# Patient Record
Sex: FEMALE | Race: WHITE | NOT HISPANIC OR LATINO | Employment: FULL TIME | URBAN - METROPOLITAN AREA
[De-identification: names, ages, dates, MRNs, and addresses within clinical notes are randomized per-mention and may not be internally consistent; named-entity substitution may affect disease eponyms.]

---

## 2019-02-25 ENCOUNTER — HOSPITAL ENCOUNTER (EMERGENCY)
Facility: HOSPITAL | Age: 49
Discharge: ANOTHER HOSPITAL | End: 2019-02-25
Attending: EMERGENCY MEDICINE

## 2019-02-25 VITALS
BODY MASS INDEX: 42.75 KG/M2 | WEIGHT: 266 LBS | RESPIRATION RATE: 16 BRPM | TEMPERATURE: 98 F | OXYGEN SATURATION: 100 % | SYSTOLIC BLOOD PRESSURE: 111 MMHG | HEART RATE: 83 BPM | DIASTOLIC BLOOD PRESSURE: 54 MMHG | HEIGHT: 66 IN

## 2019-02-25 DIAGNOSIS — N93.8 DYSFUNCTIONAL UTERINE BLEEDING: ICD-10-CM

## 2019-02-25 DIAGNOSIS — D64.9 SYMPTOMATIC ANEMIA: Primary | ICD-10-CM

## 2019-02-25 LAB
ABO + RH BLD: NORMAL
ALBUMIN SERPL BCP-MCNC: 3.7 G/DL
ALP SERPL-CCNC: 66 U/L
ALT SERPL W/O P-5'-P-CCNC: 21 U/L
ANION GAP SERPL CALC-SCNC: 13 MMOL/L
ANISOCYTOSIS BLD QL SMEAR: ABNORMAL
AST SERPL-CCNC: 14 U/L
B-HCG UR QL: NEGATIVE
BASOPHILS # BLD AUTO: 0.1 K/UL
BASOPHILS NFR BLD: 1.3 %
BILIRUB SERPL-MCNC: 0.4 MG/DL
BLD GP AB SCN CELLS X3 SERPL QL: NORMAL
BLD PROD TYP BPU: NORMAL
BLD PROD TYP BPU: NORMAL
BLOOD UNIT EXPIRATION DATE: NORMAL
BLOOD UNIT EXPIRATION DATE: NORMAL
BLOOD UNIT TYPE CODE: 9500
BLOOD UNIT TYPE CODE: 9500
BLOOD UNIT TYPE: NORMAL
BLOOD UNIT TYPE: NORMAL
BUN SERPL-MCNC: 10 MG/DL
CALCIUM SERPL-MCNC: 9.1 MG/DL
CHLORIDE SERPL-SCNC: 107 MMOL/L
CO2 SERPL-SCNC: 20 MMOL/L
CODING SYSTEM: NORMAL
CODING SYSTEM: NORMAL
CREAT SERPL-MCNC: 0.8 MG/DL
CTP QC/QA: YES
DIFFERENTIAL METHOD: ABNORMAL
DISPENSE STATUS: NORMAL
DISPENSE STATUS: NORMAL
EOSINOPHIL # BLD AUTO: 0.1 K/UL
EOSINOPHIL NFR BLD: 0.8 %
ERYTHROCYTE [DISTWIDTH] IN BLOOD BY AUTOMATED COUNT: 16.6 %
EST. GFR  (AFRICAN AMERICAN): >60 ML/MIN/1.73 M^2
EST. GFR  (NON AFRICAN AMERICAN): >60 ML/MIN/1.73 M^2
GLUCOSE SERPL-MCNC: 196 MG/DL
HCT VFR BLD AUTO: 19.8 %
HGB BLD-MCNC: 6.1 G/DL
HYPOCHROMIA BLD QL SMEAR: ABNORMAL
LYMPHOCYTES # BLD AUTO: 2.7 K/UL
LYMPHOCYTES NFR BLD: 25.9 %
MCH RBC QN AUTO: 23.3 PG
MCHC RBC AUTO-ENTMCNC: 30.7 G/DL
MCV RBC AUTO: 76 FL
MONOCYTES # BLD AUTO: 0.8 K/UL
MONOCYTES NFR BLD: 7.4 %
NEUTROPHILS # BLD AUTO: 6.8 K/UL
NEUTROPHILS NFR BLD: 64.6 %
NUM UNITS TRANS PACKED RBC: NORMAL
NUM UNITS TRANS PACKED RBC: NORMAL
OVALOCYTES BLD QL SMEAR: ABNORMAL
PLATELET # BLD AUTO: 223 K/UL
PLATELET BLD QL SMEAR: ABNORMAL
PMV BLD AUTO: 9.4 FL
POIKILOCYTOSIS BLD QL SMEAR: SLIGHT
POLYCHROMASIA BLD QL SMEAR: ABNORMAL
POTASSIUM SERPL-SCNC: 4.2 MMOL/L
PROT SERPL-MCNC: 7 G/DL
RBC # BLD AUTO: 2.61 M/UL
SODIUM SERPL-SCNC: 140 MMOL/L
WBC # BLD AUTO: 10.5 K/UL

## 2019-02-25 PROCEDURE — 80053 COMPREHEN METABOLIC PANEL: CPT

## 2019-02-25 PROCEDURE — 86901 BLOOD TYPING SEROLOGIC RH(D): CPT

## 2019-02-25 PROCEDURE — 25000003 PHARM REV CODE 250: Performed by: PHYSICIAN ASSISTANT

## 2019-02-25 PROCEDURE — 85025 COMPLETE CBC W/AUTO DIFF WBC: CPT

## 2019-02-25 PROCEDURE — 99291 CRITICAL CARE FIRST HOUR: CPT

## 2019-02-25 PROCEDURE — P9016 RBC LEUKOCYTES REDUCED: HCPCS

## 2019-02-25 PROCEDURE — 81025 URINE PREGNANCY TEST: CPT | Performed by: PHYSICIAN ASSISTANT

## 2019-02-25 PROCEDURE — 86920 COMPATIBILITY TEST SPIN: CPT

## 2019-02-25 RX ORDER — AMOXICILLIN 500 MG
CAPSULE ORAL 2 TIMES DAILY
COMMUNITY

## 2019-02-25 RX ORDER — HYDROCODONE BITARTRATE AND ACETAMINOPHEN 500; 5 MG/1; MG/1
TABLET ORAL
Status: DISCONTINUED | OUTPATIENT
Start: 2019-02-25 | End: 2019-02-25 | Stop reason: HOSPADM

## 2019-02-25 RX ORDER — PIOGLITAZONEHYDROCHLORIDE 30 MG/1
30 TABLET ORAL DAILY
COMMUNITY

## 2019-02-25 RX ORDER — NORETHINDRONE ACETATE AND ETHINYL ESTRADIOL .02; 1 MG/1; MG/1
1 TABLET ORAL DAILY
COMMUNITY

## 2019-02-25 RX ORDER — ASPIRIN 81 MG/1
81 TABLET ORAL DAILY
COMMUNITY

## 2019-02-25 RX ORDER — GLIMEPIRIDE 4 MG/1
4 TABLET ORAL
COMMUNITY

## 2019-02-25 RX ORDER — ACETAMINOPHEN 325 MG/1
650 TABLET ORAL
Status: COMPLETED | OUTPATIENT
Start: 2019-02-25 | End: 2019-02-25

## 2019-02-25 RX ORDER — ROSUVASTATIN CALCIUM 20 MG/1
20 TABLET, COATED ORAL DAILY
COMMUNITY

## 2019-02-25 RX ADMIN — ACETAMINOPHEN 650 MG: 325 TABLET ORAL at 12:02

## 2019-02-25 NOTE — ED NOTES
Blood transfusion in progress. Pt tolerating transfusion well. No s/sx of distress noted. Awaiting [facility] for transfer to [facility]. Will monitor prn.

## 2019-02-25 NOTE — ED NOTES
Pt presents to ED with c/o vaginal bleeding x 6 months. States bleeding has worsened over the past 2 weeks. Also states she had started to feel fatigued and SOB. Pt ambulated to ED bed without difficulty. VSS. No needs identified. Awaiting provider eval. Will monitor prn.

## 2019-02-25 NOTE — ED NOTES
Pt to be transferred to [facility]. Pt updated on plan of care and verbalizes understanding. No other needs identified.

## 2019-02-25 NOTE — ED PROVIDER NOTES
"Encounter Date: 2019    SCRIBE #1 NOTE: I, Thao Rigoberto, am scribing for, and in the presence of, Jennifer Feliciano PA-C.       History     Chief Complaint   Patient presents with    Vaginal Bleeding     started      Weakness     " feels like I am going to pass out "    Dizziness       Time seen by provider: 10:33 AM on 2019    Gayatri Dela Cruz is a 48 y.o. female with PMHx of HTN, DM, and morbid obesity who presents to the ED with complaints of persistent vaginal bleeding associated with generalized weakness and SOB with exertion. Patient reports having "heavy bleeding" for the past x6 months and began birth control x3 weeks ago as prescribed from Dr. Nate Dsouza MD with improvements to bleeding. Patient endorses bleeding started again almost x1 week ago (). She relays wearing 3 pads at a time and changes them every x30 minutes. She denies vaginal discharge and urinary symptoms. Overall weakness and SOB exertion started x2 days ago. Patient was referred to the ED by the doctor who prescribed the birth control. Patient is . Patient has no other medical concerns or complaints. She denies fever and onset of any other new symptoms currently. No pertinent SHx on file. NKDA noted.       The history is provided by the patient.     Review of patient's allergies indicates:  No Known Allergies  Past Medical History:   Diagnosis Date    Diabetes mellitus     Hypertension     Morbid (severe) obesity due to excess calories      History reviewed. No pertinent surgical history.  History reviewed. No pertinent family history.  Social History     Tobacco Use    Smoking status: Never Smoker    Smokeless tobacco: Never Used   Substance Use Topics    Alcohol use: Yes     Comment: social    Drug use: No     Review of Systems   Constitutional: Negative for fever.   HENT: Negative for congestion and sore throat.    Eyes: Negative for visual disturbance.   Respiratory: Positive for shortness " of breath (w/ exertion). Negative for cough.    Cardiovascular: Negative for chest pain.   Gastrointestinal: Negative for nausea and vomiting.   Genitourinary: Positive for menstrual problem and vaginal bleeding. Negative for decreased urine volume, difficulty urinating and vaginal discharge.   Musculoskeletal: Negative for joint swelling.   Skin: Negative for rash.   Neurological: Positive for weakness (generalized). Negative for headaches.   Hematological: Does not bruise/bleed easily.   All other systems reviewed and are negative.      Physical Exam     Initial Vitals   BP Pulse Resp Temp SpO2   02/25/19 1020 02/25/19 1020 02/25/19 1215 02/25/19 1020 02/25/19 1020   (!) 159/74 97 16 98 °F (36.7 °C) 100 %      MAP       --                Physical Exam    Nursing note and vitals reviewed.  Constitutional: She appears well-developed and well-nourished. She is not diaphoretic. She is cooperative.  Non-toxic appearance. She does not have a sickly appearance. No distress.   HENT:   Head: Normocephalic and atraumatic.   Right Ear: External ear normal.   Left Ear: External ear normal.   Nose: Nose normal.   Mouth/Throat: Oropharynx is clear and moist.   Eyes: Conjunctivae and lids are normal.   Neck: Normal range of motion and full passive range of motion without pain. Neck supple.   Cardiovascular: Normal rate, regular rhythm and normal heart sounds. Exam reveals no gallop and no friction rub.    No murmur heard.  Pulmonary/Chest: Breath sounds normal. She has no wheezes. She has no rhonchi. She has no rales.   Abdominal: Soft. Normal appearance. There is no tenderness. There is no rigidity, no rebound and no guarding.   Genitourinary: Uterus normal. Cervix exhibits no discharge. Right adnexum displays no mass and no tenderness. Left adnexum displays no mass and no tenderness. There is bleeding in the vagina. No vaginal discharge found.   Genitourinary Comments: Moderate amount of blood in vaginal vault. No active  hemorrhaging. No adnexal or cervical motional tenderness. No vaginal discharge.    Neurological: She is alert and oriented to person, place, and time.   Skin: Skin is warm, dry and intact. No rash noted.         ED Course   Critical Care  Date/Time: 2/25/2019 6:12 PM  Performed by: Josr Guillen MD  Authorized by: Josr Guillen MD   Direct patient critical care time: 19 minutes  Additional history critical care time: 8 minutes  Ordering / reviewing critical care time: 12 minutes  Documentation critical care time: 7 minutes  Consulting other physicians critical care time: 8 minutes  Total critical care time (exclusive of procedural time) : 54 minutes  Critical care was necessary to treat or prevent imminent or life-threatening deterioration of the following conditions: Anemia requiring a blood transfusion.  Critical care was time spent personally by me on the following activities: blood draw for specimens, discussions with consultants, obtaining history from patient or surrogate, ordering and review of laboratory studies, pulse oximetry, review of old charts, re-evaluation of patient's condition and ordering and performing treatments and interventions.        Labs Reviewed   CBC W/ AUTO DIFFERENTIAL - Abnormal; Notable for the following components:       Result Value    RBC 2.61 (*)     Hemoglobin 6.1 (*)     Hematocrit 19.8 (*)     MCV 76 (*)     MCH 23.3 (*)     MCHC 30.7 (*)     RDW 16.6 (*)     All other components within normal limits    Narrative:        critical H&H result(s) called and verbal readback obtained from   Manas Allna @1127, 02/25/2019 11:28   COMPREHENSIVE METABOLIC PANEL - Abnormal; Notable for the following components:    CO2 20 (*)     Glucose 196 (*)     All other components within normal limits   POCT URINE PREGNANCY   TYPE & SCREEN   PREPARE RBC SOFT          Imaging Results    None          Medical Decision Making:   History:   Old Medical Records: I decided to obtain old medical  records.  Clinical Tests:   Lab Tests: Reviewed and Ordered  Other:   I discussed test(s) with the performing physician.  I have discussed this case with another health care provider.       APC / Resident Notes:   Urgent evaluation of a 48-year-old female who presents with dysfunctional uterine bleeding.  She reports she has been battling issue for over 6 months and has been seeing gynecology in [place]. She reports on 2/11: US recently showed no sign of a fibroid, but did comment on a large ovarian cyst. Recommend  endometrial biopsy and surgical intervention for cyst. Started oral contraceptives at that time which stopped her bleeding for one week, then resumed on 2/19.  She states she does not have insurance and will not be added her  insurance to the end of the year.  She was attempting to wait for that before having any procedures done.  However over the last 2 days she has been feeling weak and short of breath. She is well appearing.  She denies any pain.  Her abdomen is soft and nontender.  Pelvic exam shows blood with no adnexal tenderness.  I doubt ovarian torsion.  Labs show critical H&H.  We do not have gynecology this facility and therefore patient was transferred to [facility] for further evaluation.  Blood transfusion started prior to transfer.  She has been accepted by Dr. Auguste (GYN) and will go to [facility] ER where she has been accepted by Dr. Conde. Patient was updated on plan of care. All questions answered. Case discussed with Dr. Guillen who has evaluated the patient and is in agreement with the plan of care.       Scribe Attestation:   Scribe #1: I performed the above scribed service and the documentation accurately describes the services I performed. I attest to the accuracy of the note.    Attending Attestation:     Physician Attestation Statement for NP/PA:   I have conducted a face to face encounter with this patient in addition to the NP/PA, due to Medical Complexity    Other  NP/PA Attestation Additions:      Medical Decision Making: I provided a face to face evaluation of this patient.  I discussed the patient's care with Advanced Practice Clinician.  I reviewed their note and agree with the history, physical, assessment, diagnosis, treatment, and plan provided by the Advanced Practice Clinician. My overall impression is vaginal bleeding, anemia.            I, Jennifer Feliciano PA-C, personally performed the services described in this documentation. All medical record entries made by the scribe were at my direction and in my presence.  I have reviewed the chart and agree that the record reflects my personal performance and is accurate and complete. Jennifer Feliciano PA-C.  11:12 AM 02/25/2019            ED Course as of Feb 25 1635   Mon Feb 25, 2019   1049 BP: (!) 159/74 [EF]   1049 Temp: 98 °F (36.7 °C) [EF]   1049 Temp src: Oral [EF]   1049 Pulse: 97 [EF]   1049 SpO2: 100 % [EF]   1128 Hemoglobin: (!) 6.1 [EF]      ED Course User Index  [EF] Josr Guillen MD     Clinical Impression:       ICD-10-CM ICD-9-CM   1. Symptomatic anemia D64.9 285.9   2. Dysfunctional uterine bleeding N93.8 626.8         Disposition:   Disposition: Transferred                        Jennifer Feliciano PA-C  02/25/19 1635       Josr Guillen MD  02/25/19 2738

## 2020-06-13 ENCOUNTER — OFFICE VISIT (OUTPATIENT)
Dept: URGENT CARE | Facility: CLINIC | Age: 50
End: 2020-06-13

## 2020-06-13 VITALS
TEMPERATURE: 98 F | DIASTOLIC BLOOD PRESSURE: 94 MMHG | RESPIRATION RATE: 16 BRPM | OXYGEN SATURATION: 97 % | HEIGHT: 66 IN | SYSTOLIC BLOOD PRESSURE: 171 MMHG | WEIGHT: 289 LBS | BODY MASS INDEX: 46.45 KG/M2 | HEART RATE: 74 BPM

## 2020-06-13 DIAGNOSIS — L03.011 PARONYCHIA OF FINGER OF RIGHT HAND: Primary | ICD-10-CM

## 2020-06-13 PROCEDURE — 99204 OFFICE O/P NEW MOD 45 MIN: CPT | Mod: S$GLB,,, | Performed by: NURSE PRACTITIONER

## 2020-06-13 PROCEDURE — 99204 PR OFFICE/OUTPT VISIT, NEW, LEVL IV, 45-59 MIN: ICD-10-PCS | Mod: S$GLB,,, | Performed by: NURSE PRACTITIONER

## 2020-06-13 RX ORDER — SULFAMETHOXAZOLE AND TRIMETHOPRIM 800; 160 MG/1; MG/1
1 TABLET ORAL 2 TIMES DAILY
Qty: 14 TABLET | Refills: 0 | Status: SHIPPED | OUTPATIENT
Start: 2020-06-13 | End: 2020-06-13 | Stop reason: SDUPTHER

## 2020-06-13 RX ORDER — SULFAMETHOXAZOLE AND TRIMETHOPRIM 800; 160 MG/1; MG/1
1 TABLET ORAL 2 TIMES DAILY
Qty: 14 TABLET | Refills: 0 | Status: SHIPPED | OUTPATIENT
Start: 2020-06-13 | End: 2020-06-15

## 2020-06-13 RX ORDER — HYDROCODONE BITARTRATE AND ACETAMINOPHEN 5; 325 MG/1; MG/1
1 TABLET ORAL EVERY 6 HOURS PRN
Qty: 4 TABLET | Refills: 0 | Status: SHIPPED | OUTPATIENT
Start: 2020-06-13 | End: 2022-10-19

## 2020-06-13 NOTE — PATIENT INSTRUCTIONS
Paronychia of the Finger or Toe  Paronychia is an infection near a fingernail or toenail. It usually occurs when an opening in the cuticle or an ingrown toenail lets bacteria under the skin.  The infection will need to be drained if pus is present. If the infection has been caught early, you may need only antibiotic treatment. Healing will take about 1 to 2 weeks.  Home care  Follow these guidelines when caring for yourself at home:  · Clean and soak the toe or finger. Do this 2 times a day for the first 3 days. To do so:  ¨ Soak your foot or hand in a tub of warm water for 5 minutes. Or hold your toe or finger under a faucet of warm running water for 5 minutes.  ¨ Clean any crust away with soap and water using a cotton swab.  ¨ Put antibiotic ointment on the infected area.  · Change the dressing daily or any time it gets dirty.  · If you were given antibiotics, take them as directed until they are all gone.  · If your infection is on a toe, wear comfortable shoes with a lot of toe room. You can also wear open-toed sandals while your toe heals.  · You may use over-the-counter medicine (acetaminophen or ibuprofen to help with pain, unless another medicine was prescribed. If you have chronic liver or kidney disease, talk with your healthcare provider before using these medicines. Also talk with your provider if you've had a stomach ulcer or GI (gastrointestinal) bleeding.  Prevention  The following can prevent paronychia:  · Avoid cutting or playing with your cuticles at home.  · Don't bite your nails.  · Don't suck on your thumbs or fingers.  Follow-up care  Follow up with your healthcare provider, or as advised.  When to seek medical advice  Call your healthcare provider right away if any of these occur:  · Redness, pain, or swelling of the finger or toe gets worse  · Red streaks in the skin leading away from the wound  · Pus or fluid draining from the nail area  · Fever of 100.4ºF (38ºC) or higher, or as directed  by your provider  Date Last Reviewed: 8/1/2016  © 4371-6617 [employer]. [address] [place] [address], [place] [address]. All rights reserved. This information is not intended as a substitute for professional medical care. Always follow your healthcare professional's instructions.

## 2020-06-13 NOTE — PROGRESS NOTES
"Subjective:       Patient ID: Gayatri Dela Cruz is a 49 y.o. female.    Vitals:  height is 5' 6" (1.676 m) and weight is 131.1 kg (289 lb). Her temperature is 98.4 °F (36.9 °C). Her blood pressure is 171/94 (abnormal) and her pulse is 74. Her respiration is 16 and oxygen saturation is 97%.     Chief Complaint: Hand Pain    Pts right middle finger is swollen. She belives her cuticle is infected. Started about a week ago.       Constitution: Negative for appetite change, chills and fever.   HENT: Negative for ear pain, congestion and sore throat.    Neck: Negative for painful lymph nodes.   Eyes: Negative for eye discharge and eye redness.   Respiratory: Negative for cough.    Gastrointestinal: Negative for vomiting and diarrhea.   Genitourinary: Negative for dysuria.   Musculoskeletal: Positive for pain and joint swelling. Negative for trauma and muscle ache.   Skin: Positive for erythema. Negative for rash.   Neurological: Negative for headaches and seizures.   Hematologic/Lymphatic: Negative for swollen lymph nodes.       Objective:      Physical Exam   Constitutional: She is oriented to person, place, and time. She appears well-developed.   HENT:   Head: Normocephalic and atraumatic. Head is without abrasion, without contusion and without laceration.   Right Ear: External ear normal.   Left Ear: External ear normal.   Nose: Nose normal.   Mouth/Throat: Oropharynx is clear and moist and mucous membranes are normal.   Eyes: Pupils are equal, round, and reactive to light. Conjunctivae, EOM and lids are normal.   Neck: Trachea normal, full passive range of motion without pain and phonation normal. Neck supple.   Cardiovascular: Normal rate, regular rhythm and normal heart sounds.   Pulmonary/Chest: Effort normal and breath sounds normal. No stridor. No respiratory distress.   Musculoskeletal:      Right hand: She exhibits decreased range of motion, tenderness and swelling. She exhibits no bony tenderness, normal " two-point discrimination, normal capillary refill, no deformity and no laceration. Decreased sensation noted.        Hands:    Neurological: She is alert and oriented to person, place, and time.   Skin: Skin is warm, dry, intact and no rash. Capillary refill takes less than 2 seconds. Lesions:  erythemaabrasion, burn, bruising and ecchymosis  Psychiatric: Her speech is normal and behavior is normal. Judgment and thought content normal.   Nursing note and vitals reviewed.        Assessment:       1. Paronychia of finger of right hand        Plan:         Paronychia of finger of right hand    Other orders  -     Discontinue: sulfamethoxazole-trimethoprim 800-160mg (BACTRIM DS) 800-160 mg Tab; Take 1 tablet by mouth 2 (two) times daily. for 7 days  Dispense: 14 tablet; Refill: 0  -     HYDROcodone-acetaminophen (NORCO) 5-325 mg per tablet; Take 1 tablet by mouth every 6 (six) hours as needed for Pain.  Dispense: 4 tablet; Refill: 0  -     sulfamethoxazole-trimethoprim 800-160mg (BACTRIM DS) 800-160 mg Tab; Take 1 tablet by mouth 2 (two) times daily. for 7 days  Dispense: 14 tablet; Refill: 0

## 2020-06-15 RX ORDER — CLINDAMYCIN HYDROCHLORIDE 300 MG/1
300 CAPSULE ORAL EVERY 8 HOURS
Qty: 21 CAPSULE | Refills: 0 | Status: SHIPPED | OUTPATIENT
Start: 2020-06-15 | End: 2020-06-22

## 2021-04-29 ENCOUNTER — PATIENT MESSAGE (OUTPATIENT)
Dept: RESEARCH | Facility: HOSPITAL | Age: 51
End: 2021-04-29

## 2022-01-27 ENCOUNTER — LAB VISIT (OUTPATIENT)
Dept: PRIMARY CARE CLINIC | Facility: OTHER | Age: 52
End: 2022-01-27
Attending: INTERNAL MEDICINE
Payer: COMMERCIAL

## 2022-01-27 DIAGNOSIS — Z20.822 ENCOUNTER FOR LABORATORY TESTING FOR COVID-19 VIRUS: ICD-10-CM

## 2022-01-27 PROCEDURE — U0003 INFECTIOUS AGENT DETECTION BY NUCLEIC ACID (DNA OR RNA); SEVERE ACUTE RESPIRATORY SYNDROME CORONAVIRUS 2 (SARS-COV-2) (CORONAVIRUS DISEASE [COVID-19]), AMPLIFIED PROBE TECHNIQUE, MAKING USE OF HIGH THROUGHPUT TECHNOLOGIES AS DESCRIBED BY CMS-2020-01-R: HCPCS | Performed by: INTERNAL MEDICINE

## 2022-01-28 LAB
SARS-COV-2 RNA RESP QL NAA+PROBE: DETECTED
SARS-COV-2- CYCLE NUMBER: 39

## 2022-10-19 ENCOUNTER — HOSPITAL ENCOUNTER (OUTPATIENT)
Dept: RADIOLOGY | Facility: CLINIC | Age: 52
Discharge: HOME | End: 2022-10-19
Attending: PODIATRIST
Payer: COMMERCIAL

## 2022-10-19 ENCOUNTER — OFFICE VISIT (OUTPATIENT)
Dept: PODIATRY | Facility: CLINIC | Age: 52
End: 2022-10-19
Payer: COMMERCIAL

## 2022-10-19 VITALS
HEIGHT: 66 IN | OXYGEN SATURATION: 100 % | HEART RATE: 80 BPM | RESPIRATION RATE: 18 BRPM | WEIGHT: 293 LBS | BODY MASS INDEX: 47.09 KG/M2

## 2022-10-19 DIAGNOSIS — M20.5X2 ACQUIRED HALLUX LIMITUS OF BOTH FEET: ICD-10-CM

## 2022-10-19 DIAGNOSIS — M79.671 PAIN IN BOTH FEET: ICD-10-CM

## 2022-10-19 DIAGNOSIS — D36.10 NEUROMA: ICD-10-CM

## 2022-10-19 DIAGNOSIS — M19.079 ARTHRITIS OF FOOT: ICD-10-CM

## 2022-10-19 DIAGNOSIS — M79.672 PAIN IN BOTH FEET: ICD-10-CM

## 2022-10-19 DIAGNOSIS — E11.42 DIABETIC POLYNEUROPATHY ASSOCIATED WITH TYPE 2 DIABETES MELLITUS: Primary | ICD-10-CM

## 2022-10-19 DIAGNOSIS — M20.5X1 ACQUIRED HALLUX LIMITUS OF BOTH FEET: ICD-10-CM

## 2022-10-19 PROCEDURE — 1159F PR MEDICATION LIST DOCUMENTED IN MEDICAL RECORD: ICD-10-PCS | Mod: CPTII,S$GLB,, | Performed by: PODIATRIST

## 2022-10-19 PROCEDURE — 4010F PR ACE/ARB THEARPY RXD/TAKEN: ICD-10-PCS | Mod: CPTII,S$GLB,, | Performed by: PODIATRIST

## 2022-10-19 PROCEDURE — 1159F MED LIST DOCD IN RCRD: CPT | Mod: CPTII,S$GLB,, | Performed by: PODIATRIST

## 2022-10-19 PROCEDURE — 99204 PR OFFICE/OUTPT VISIT, NEW, LEVL IV, 45-59 MIN: ICD-10-PCS | Mod: S$GLB,,, | Performed by: PODIATRIST

## 2022-10-19 PROCEDURE — 99204 OFFICE O/P NEW MOD 45 MIN: CPT | Mod: S$GLB,,, | Performed by: PODIATRIST

## 2022-10-19 PROCEDURE — 73630 XR FOOT COMPLETE 3 VIEW BILATERAL: ICD-10-PCS | Mod: 50,S$GLB,, | Performed by: RADIOLOGY

## 2022-10-19 PROCEDURE — 1160F PR REVIEW ALL MEDS BY PRESCRIBER/CLIN PHARMACIST DOCUMENTED: ICD-10-PCS | Mod: CPTII,S$GLB,, | Performed by: PODIATRIST

## 2022-10-19 PROCEDURE — 1160F RVW MEDS BY RX/DR IN RCRD: CPT | Mod: CPTII,S$GLB,, | Performed by: PODIATRIST

## 2022-10-19 PROCEDURE — 73630 X-RAY EXAM OF FOOT: CPT | Mod: 50,S$GLB,, | Performed by: RADIOLOGY

## 2022-10-19 PROCEDURE — 4010F ACE/ARB THERAPY RXD/TAKEN: CPT | Mod: CPTII,S$GLB,, | Performed by: PODIATRIST

## 2022-10-19 RX ORDER — LIRAGLUTIDE 6 MG/ML
2.4 INJECTION SUBCUTANEOUS
COMMUNITY
Start: 2022-04-06

## 2022-10-19 RX ORDER — GABAPENTIN 600 MG/1
600 TABLET ORAL 3 TIMES DAILY
COMMUNITY
Start: 2022-07-25

## 2022-10-19 RX ORDER — METFORMIN HYDROCHLORIDE 1000 MG/1
1000 TABLET ORAL 2 TIMES DAILY
COMMUNITY
Start: 2022-08-30

## 2022-10-19 RX ORDER — PEN NEEDLE, DIABETIC 29 G X1/2"
NEEDLE, DISPOSABLE MISCELLANEOUS
COMMUNITY
Start: 2021-12-16

## 2022-10-19 RX ORDER — PNV NO.95/FERROUS FUM/FOLIC AC 28MG-0.8MG
TABLET ORAL
COMMUNITY

## 2022-10-19 RX ORDER — LISINOPRIL 20 MG/1
20 TABLET ORAL DAILY
COMMUNITY
Start: 2022-08-30

## 2022-10-19 RX ORDER — MULTIVITAMIN
1 TABLET ORAL DAILY
COMMUNITY

## 2022-10-19 NOTE — PATIENT INSTRUCTIONS
Your Diabetes Foot Care Program    Every day you depend on your feet to keep you moving. But when you have diabetes, your feet need special care. Even a small foot problem can become very serious. So dont take your feet for granted. By working with your diabetes healthcare team, you can learn how to protect your feet and keep them healthy.  Evaluating your feet  An evaluation helps your healthcare provider check the condition of your feet. The evaluation includes a review of your diabetes history and overall health. It may also include a foot exam, X-rays, or other tests. These can help show problems beneath the skin that you cant see or feel.  Medical history  You will be asked about your overall health and any history of foot problems. Youll also discuss your diabetes history, such as whether your blood sugar level has changed over time. It also includes questions about sensations of pain, tingling, pins and needles, or numbness. Your healthcare provider will also want to know if you have high blood pressure and heart disease, or if you smoke. Be sure to mention any medicines (including over-the-counter), supplements, or herbal remedies you take.  Foot exam  A foot exam checks the condition of different parts of your foot. First, your skin and nails are examined for any signs of infection. Blood flow is checked by feeling for the pulses in each foot. You may also have tests to study the nerves in the foot. These include using a small filament (wire) to see how sensitive your feet are. In certain cases, you will be asked to walk a short distance to check for bone, joint, and muscle problems.  Diagnostic tests  If needed, your healthcare provider will suggest certain tests to learn more about your feet. These include:  Doppler tests to measure blood flow in the feet and lower leg.  X-rays, which can show bone or joint problems.  Other imaging tests, such as an MRI (magnetic resonance imaging), bone scan, and CT  (computed tomography) scan. These can help show bone infections.  Other tests, such as vascular tests, which study the blood flow in your feet and legs. You may also have nerve studies to learn how sensitive your feet are.  Creating a foot care program  Based on the evaluation, your healthcare provider will create a foot care program for you. Your program may be as simple as starting a daily self-care routine and changing the types of shoes your wear. It may also involve treating minor foot problems, such as a corn or blister. In some cases, surgery will be needed to treat an infection or mechanical problems, such as hammer toes.  Preventing problems  When you have diabetes, its easier to prevent problems than to treat them later on. So see your healthcare team for regular checkups and foot care. Your healthcare team can also help you learn more about caring for your feet at home. For example, you may be told to avoid walking barefoot. Or you may be told that special footwear is needed to protect your feet.  Have regular checkups  Foot problems can develop quickly. So be sure to follow your healthcare teams schedule for regular checkups. During office visits, take off your shoes and socks as soon as you get in the exam room. Ask your healthcare provider to examine your feet for problems. This will make it easier to find and treat small skin irritations before they get worse. Regular checkups can also help keep track of the blood flow and feeling in your feet. If you have neuropathy (lack of feeling in your feet), you will need to have checkups more often.  Learn about self-care  The more you know about diabetes and your feet, the easier it will be to prevent problems. Members of your healthcare team can teach you how to inspect your feet and teach you to look for warning signs. They can also give you other foot care tips. During office visits, be sure to ask any questions you have.  Date Last Reviewed: 7/1/2016  ©  2720-1909 [employer] [address] [place] [address], [place] [address]. All rights reserved. This information is not intended as a substitute for professional medical care. Always follow your healthcare professional's instructions.       Husain's Neuroma (Intermetatarsal Neuroma)    What Is a Neuroma?  A neuroma is a thickening of nerve tissue that may develop in various parts of the body. The most common neuroma in the foot is a Husain's neuroma, which occurs between the third and fourth toes. It is sometimes referred to as an intermetatarsal neuroma. Intermetatarsal describes its location in the ball of the foot between the metatarsal bones. Neuromas may also occur in other locations in the foot.    The thickening of the nerve that defines a neuroma is the result of compression and irritation of the nerve. This compression creates enlargement of the nerve, causing the symptoms of Husain's neuroma and eventually leading to permanent nerve damage.      Causes of Husain's Neuroma  Anything that causes compression or irritation of the nerve can lead to the development of a neuroma. One of the most common offenders is wearing shoes that have a tapered toe box or high-heeled shoes that cause the toes to be forced into the toe box. People with certain foot deformities--bunions, hammertoes, flatfeet or more flexible feet--are at higher risk for developing a neuroma. Other potential causes are activities that involve repetitive irritation to the ball of the foot, such as running or court sports. An injury or other type of trauma to the area may also lead to a neuroma.      Symptoms of Husain's Neuroma  If you have a Husain's neuroma, you may have one or more of these symptoms where the nerve damage is occurring:  Tingling, burning or numbness  Pain  A feeling that something is inside the ball of the foot  A feeling that there is something in the shoe or a sock is bunched up    The progression of a Husain's neuroma  often follows this pattern:  The symptoms begin gradually. At first, they occur only occasionally when wearing narrow-toed shoes or performing certain aggravating activities.  The symptoms may go away temporarily by removing the shoe, massaging the foot or avoiding aggravating shoes or activities.  Over time, the symptoms progressively worsen and may persist for several days or weeks.  The symptoms become more intense as the neuroma enlarges and the temporary changes in the nerve become permanent.      Diagnosis of Husain's Neuroma  To arrive at a diagnosis, the foot and ankle surgeon will obtain a thorough history of your symptoms and examine your foot. During the physical examination, the doctor attempts to reproduce your symptoms by manipulating your foot. Other tests or imaging studies may be performed.    The best time to see your foot and ankle surgeon is early in the development of symptoms. Early diagnosis of a Husain's neuroma greatly lessens the need for more invasive treatments and may help you avoid surgery.      Nonsurgical Treatment  In developing a treatment plan, your foot and ankle surgeon will first determine how long you have had the neuroma and will evaluate its stage of development. Treatment approaches vary according to the severity of the problem.  For mild to moderate neuromas, treatment options may include:  Padding. Padding techniques provide support for the metatarsal arch, thereby lessening the pressure on the nerve and decreasing the compression when walking.  Icing. Placing an ice pack on the affected area helps reduce swelling.  Orthotic devices. Custom orthotic devices provided by your foot and ankle surgeon provide the support needed to reduce pressure and compression on the nerve.  Activity modifications. Activities that put repetitive pressure on the neuroma should be avoided until the condition improves.  Shoe modifications. Wear shoes with a wide toe box and avoid narrow-toed  shoes or shoes with high heels.  Medications. Oral nonsteroidal anti-inflammatory drugs (NSAIDs), such as ibuprofen, may be recommended to reduce pain and inflammation.  Injection therapy. Treatment may include injections of cortisone, local anesthetics or other agents.      When Is Surgery Needed?  Surgery may be considered in patients who have not responded adequately to nonsurgical treatments. Your foot and ankle surgeon will determine the approach that is best for your condition. The length of the recovery period will vary depending on the procedure performed.  Regardless of whether you have undergone surgical or nonsurgical treatment, your surgeon will recommend long-term measures to help keep your symptoms from returning. These include appropriate footwear and modification of activities to reduce the repetitive pressure on the foot.      Why choose a foot and ankle surgeon?  Foot and ankle surgeons are the leading experts in foot and ankle care today. As doctors of podiatric medicine - also known as podiatrists, DPMs or occasionally foot and ankle doctors - they are the board-certified surgical specialists of the podiatric profession. Foot and ankle surgeons have more education and training specific to the foot and ankle than any other healthcare provider.  Foot and ankle surgeons treat all conditions affecting the foot and ankle, from the simple to the complex, in patients of all ages including Husain's neuroma. Their intensive education and training qualify foot and ankle surgeons to perform a wide range of surgeries, including any surgery that may be indicated for Husain's neuroma.

## 2022-10-19 NOTE — PROGRESS NOTES
"  [address] [facility] [place] [place] [address]  Phone: [phone]   [phone]    Patient's PCP:Primary Doctor No  Referring Provider: Aaareferral Self    Subjective:      Chief Complaint:: Foot Pain (Bilateral foot pain/Right heel and left metatarsal pad and toes)    HPI  Gayatri Dela Cruz is a 51 y.o. female who presents today with a complaint of bilateral foot pain lasting for one year. Onset of symptoms pain and stiffness and reports no trauma.  Current symptoms include left foot pain and stiffness. Right foot sharp to aching pain  Aggravating factors are walking, weightbearing, prolonged use first steps in the morning. Symptoms have progressed. Treatment to date have included different shoe gear, arch support, stretching.    Systemic Doctor: Dr. Rodriguez  Date Last Seen: 09/2022  Blood Sugar: has not checked today  Hemoglobin A1c: 7.2    Vitals:    10/19/22 1618   Pulse: 80   Resp: 18   SpO2: 100%   Weight: 133.8 kg (295 lb)   Height: 5' 6" (1.676 m)   PainSc:   4      Shoe Size: 10    Past Surgical History:   Procedure Laterality Date    HYSTERECTOMY  2019     Past Medical History:   Diagnosis Date    Diabetes mellitus     Hypertension     Morbid (severe) obesity due to excess calories      History reviewed. No pertinent family history.     Social History:   Marital Status:   Alcohol History:  reports current alcohol use.  Tobacco History:  reports that she has never smoked. She has never used smokeless tobacco.  Drug History:  reports no history of drug use.    Review of patient's allergies indicates:  No Known Allergies    Current Outpatient Medications   Medication Sig Dispense Refill    liraglutide 0.6 mg/0.1 mL, 18 mg/3 mL, subq PNIJ (VICTOZA 2-ALBAN) 0.6 mg/0.1 mL (18 mg/3 mL) PnIj pen Inject 2.4 mg into the skin.      pen needle, diabetic 31 gauge x 1/4" Ndle as per administration instructions      aspirin (ECOTRIN) 81 MG EC tablet Take 81 mg by mouth once daily.      cyanocobalamin " (VITAMIN B-12) 100 MCG tablet Take by mouth.      fish oil-omega-3 fatty acids 300-1,000 mg capsule Take by mouth 2 (two) times daily.      gabapentin (NEURONTIN) 600 MG tablet Take 600 mg by mouth 3 (three) times daily.      glimepiride (AMARYL) 4 MG tablet Take 4 mg by mouth before breakfast.      lisinopriL (PRINIVIL,ZESTRIL) 20 MG tablet Take 20 mg by mouth once daily.      metFORMIN (GLUCOPHAGE) 1000 MG tablet Take 1,000 mg by mouth 2 (two) times daily.      multivitamin (THERAGRAN) per tablet Take 1 tablet by mouth once daily.      norethindrone-ethinyl estradiol (MICROGESTIN 1/20) 1-20 mg-mcg per tablet Take 1 tablet by mouth once daily.      pioglitazone (ACTOS) 30 MG tablet Take 30 mg by mouth once daily.      rosuvastatin (CRESTOR) 20 MG tablet Take 20 mg by mouth once daily.       No current facility-administered medications for this visit.       Review of Systems   Constitutional:  Negative for chills, fatigue, fever and unexpected weight change.   HENT:  Negative for hearing loss and trouble swallowing.    Eyes:  Negative for photophobia and visual disturbance.   Respiratory:  Negative for cough, shortness of breath and wheezing.    Cardiovascular:  Negative for chest pain, palpitations and leg swelling.   Gastrointestinal:  Negative for abdominal pain and nausea.   Genitourinary:  Negative for dysuria and frequency.   Musculoskeletal:  Positive for arthralgias. Negative for back pain, gait problem, joint swelling and myalgias.   Skin:  Negative for rash and wound.   Neurological:  Positive for numbness. Negative for tremors, seizures, weakness and headaches.   Hematological:  Does not bruise/bleed easily.       Objective:        Physical Exam:   Foot Exam    General  General Appearance: appears stated age and healthy   Orientation: alert and oriented to person, place, and time   Affect: appropriate   Gait: unimpaired       Right Foot/Ankle     Inspection and Palpation  Ecchymosis: none  Tenderness:  lisfranc joint   Swelling: none   Arch: normal  Hallux limitus: yes  Skin Exam: skin intact; no drainage, no ulcer and no erythema   Neurovascular  Dorsalis pedis: 2+  Posterior tibial: 2+  Capillary Refill: 2+  Varicose veins: not present  Saphenous nerve sensation: diminished  Tibial nerve sensation: diminished  Superficial peroneal nerve sensation: diminished  Deep peroneal nerve sensation: diminished  Sural nerve sensation: diminished    Edema  Type of edema: non-pitting    Muscle Strength  Ankle dorsiflexion: 5  Ankle plantar flexion: 5  Ankle inversion: 5  Ankle eversion: 5  Great toe extension: 5  Great toe flexion: 5    Range of Motion    Normal right ankle ROM    Tests  Anterior drawer: negative   Talar tilt: negative   PT Tinel's sign: negative    Paresthesia: positive    Left Foot/Ankle      Inspection and Palpation  Ecchymosis: none  Tenderness: lesser metatarsophalangeal joints and lisfranc joint (Third intermetatarsal space)  Swelling: none   Arch: normal  Hallux limitus: yes  Skin Exam: skin intact; no drainage, no ulcer and no erythema   Neurovascular  Dorsalis pedis: 2+  Posterior tibial: 2+  Capillary refill: 2+  Varicose veins: not present  Saphenous nerve sensation: diminished  Tibial nerve sensation: diminished  Superficial peroneal nerve sensation: diminished  Deep peroneal nerve sensation: diminished  Sural nerve sensation: diminished    Edema  Type of edema: non-pitting    Muscle Strength  Ankle dorsiflexion: 5  Ankle plantar flexion: 5  Ankle inversion: 5  Ankle eversion: 5  Great toe extension: 5  Great toe flexion: 5    Range of Motion    Normal left ankle ROM    Tests  Anterior drawer: negative   Talar tilt: negative   PT Tinel's sign: negative  Paresthesia: positive  Comments  Pain on palpation of the 3rd intermetatarsal space  Physical Exam  Cardiovascular:      Pulses:           Dorsalis pedis pulses are 2+ on the right side and 2+ on the left side.        Posterior tibial pulses are  2+ on the right side and 2+ on the left side.   Feet:      Right foot:      Skin integrity: No ulcer or erythema.      Left foot:      Skin integrity: No ulcer or erythema.             Right Ankle/Foot Exam     Range of Motion   The patient has normal right ankle ROM.    Left Ankle/Foot Exam     Tenderness   The patient is tender to palpation of the lesser metatarsophalangeal joints.    Range of Motion   The patient has normal left ankle ROM.     Comments:  Pain on palpation of the 3rd intermetatarsal space      Muscle Strength   Right Lower Extremity   Ankle Dorsiflexion:  5   Plantar flexion:  5/5  Left Lower Extremity   Ankle Dorsiflexion:  5   Plantar flexion:  5/5     Reflexes     Left Side  Paresthesia: present    Right Side   Paresthesia: present    Vascular Exam     Right Pulses  Dorsalis Pedis:      2+  Posterior Tibial:      2+        Left Pulses  Dorsalis Pedis:      2+  Posterior Tibial:      2+         Imaging: X-Ray Foot Complete Bilateral  Narrative: EXAMINATION:  XR FOOT COMPLETE 3 VIEW BILATERAL    CLINICAL HISTORY:  Pain in right foot    TECHNIQUE:  AP, lateral, and oblique views of both feet were performed.    COMPARISON:  None    FINDINGS:  Bilateral plantar and dorsal calcaneal spurs.  Bilateral bunion formation 1st metatarsal heads.  Bony eburnation anteriorly tarsal tarsal joints more so right talar navicular joint.  No acute fracture or dislocation  Impression: As above    Electronically signed by: Kristin Jang MD  Date:    10/19/2022  Time:    17:00             Assessment:       1. Diabetic polyneuropathy associated with type 2 diabetes mellitus    2. Arthritis of foot    3. Neuroma - Left Foot    4. Acquired hallux limitus of both feet    5. Pain in both feet      Plan:   Diabetic polyneuropathy associated with type 2 diabetes mellitus  -     DIABETIC SHOES FOR HOME USE    Arthritis of foot  -     DIABETIC SHOES FOR HOME USE    Neuroma - Left Foot    Acquired hallux limitus of both  feet    Pain in both feet  -     X-Ray Foot Complete Bilateral    Follow up if symptoms worsen or fail to improve.    Procedures        Discussed the patient that the pain in her feet as combination of diabetic neuropathy, arthritis, and a neuroma on the left foot.  We discussed different conservative and surgical treatment options.  I explained that proper shoes and support should make a significant difference in her symptoms.  I am going to give the patient a prescription for diabetic shoes and insoles.  We discussed possibility of steroid injection for the neuroma if her symptoms are not improving.    Counseling:     I provided patient education verbally regarding:   Patient diagnosis, treatment options, as well as alternatives, risks, and benefits.     Counseled patient on the aspects of diabetes and how it pertains to the feet.  I explained the importance of proper diabetic foot care and how it is essential for the health of their feet.    I discussed the importance of knowing their HGA1c and that the level needs to be as close to 6 as possible.  I discussed the increase complications of high blood sugar including stroke, blindness, heart attack, kidney failure and loss of limb secondary to neuropathy and PVD.    Patient  was made aware of inspecting their feet.  Patient was told to be aware of any breaks in the skin or redness.  With neuropathy, these areas are not recognized early due to the numbness.  I discussed the lab test and NCV EMG test and also the role of the neurologist in evaluating the patient.  I discussed Diabetes, lower back issues, metabolic disorders, systemic causes, chemotherapy, viatmain defiencey, heavy metal exposure, as some of the causes.  I also explained that as much as 40% of the time we can not find a cause.  I discussed different treatments available to control the symptoms but whcih may not cure the problem.      Shoe inspection. Patient instructed on proper foot hygeine. We  discussed wearing proper shoe gear, daily foot inspections, never walking without protective shoe gear, never putting sharp instruments to feet, routine podiatric nail visits every 2-3 months.      I counseled patient on causes and treatments for neuromas. Wearing tight or high-heeled shoes can cause a neuroma. Shoes that are too narrow or too pointed squeeze the bones in the ball of the foot. Shoes with high heels put extra pressure on the ends of the bones. When the bones are squeezed together, they pinch the nerve that runs between them. Taking off your shoes and rubbing the ball of your foot may decrease or relieve the pain. Recommend shoes with more room in the toe box. Sometimes steroid injection is necessary to alleviate symptoms. Discussed alcohol sclerosing injections as another option for conservative treatment.     I discussed the conservative treatent of arthritis consisiting of shoe modification, OTC NSAID, cortisone shots, a series of supartz injections, avoiding painful activities and common sense.  I explained that the arthritis may become more painful causng more disability and the possibility of further treatment.        This note was created using Dragon voice recognition software that occasionally misinterpreted phrases or words.

## 2022-10-20 NOTE — PROGRESS NOTES
"  [address] [facility] [place]. [place] [address]  Phone: [phone]   [phone]    Patient's PCP:Primary Doctor No  Referring Provider: Aaareferral Self    Subjective:      Chief Complaint:: Foot Pain (Bilateral foot pain/Right heel and left metatarsal pad and toes)    HPI  Gayatri Dela Cruz is a 51 y.o. female who presents today with a complaint of *** lasting for ***. Onset of symptoms *** and reports {.podsymptoms:[id number]:o}.  Current symptoms include ***.  Aggravating factors are ***. Symptoms have ***. Treatment to date have included ***.    Systemic Doctor: ***  Date Last Seen: ***  Blood Sugar: ***  Hemoglobin A1c: ***    Vitals:    10/19/22 1618   Pulse: 80   Resp: 18   SpO2: 100%   Weight: 133.8 kg (295 lb)   Height: 5' 6" (1.676 m)   PainSc:   4      Shoe Size:     Past Surgical History:   Procedure Laterality Date    HYSTERECTOMY  2019     Past Medical History:   Diagnosis Date    Diabetes mellitus     Hypertension     Morbid (severe) obesity due to excess calories      History reviewed. No pertinent family history.     Social History:   Marital Status:   Alcohol History:  reports current alcohol use.  Tobacco History:  reports that she has never smoked. She has never used smokeless tobacco.  Drug History:  reports no history of drug use.    Review of patient's allergies indicates:  No Known Allergies    Current Outpatient Medications   Medication Sig Dispense Refill    liraglutide 0.6 mg/0.1 mL, 18 mg/3 mL, subq PNIJ (VICTOZA 2-ALBAN) 0.6 mg/0.1 mL (18 mg/3 mL) PnIj pen Inject 2.4 mg into the skin.      pen needle, diabetic 31 gauge x 1/4" Ndle as per administration instructions      aspirin (ECOTRIN) 81 MG EC tablet Take 81 mg by mouth once daily.      cyanocobalamin (VITAMIN B-12) 100 MCG tablet Take by mouth.      fish oil-omega-3 fatty acids 300-1,000 mg capsule Take by mouth 2 (two) times daily.      gabapentin (NEURONTIN) 600 MG tablet Take 600 mg by mouth 3 (three) times daily. "      glimepiride (AMARYL) 4 MG tablet Take 4 mg by mouth before breakfast.      lisinopriL (PRINIVIL,ZESTRIL) 20 MG tablet Take 20 mg by mouth once daily.      metFORMIN (GLUCOPHAGE) 1000 MG tablet Take 1,000 mg by mouth 2 (two) times daily.      multivitamin (THERAGRAN) per tablet Take 1 tablet by mouth once daily.      norethindrone-ethinyl estradiol (MICROGESTIN 1/20) 1-20 mg-mcg per tablet Take 1 tablet by mouth once daily.      pioglitazone (ACTOS) 30 MG tablet Take 30 mg by mouth once daily.      rosuvastatin (CRESTOR) 20 MG tablet Take 20 mg by mouth once daily.       No current facility-administered medications for this visit.       Review of Systems      Objective:        Physical Exam:   Foot Exam  Physical Exam  Ortho/SPM Exam     Imaging:            Assessment:       1. Diabetic polyneuropathy associated with type 2 diabetes mellitus    2. Arthritis of foot    3. Neuroma - Left Foot    4. Pain in both feet      Plan:   Diabetic polyneuropathy associated with type 2 diabetes mellitus  -     DIABETIC SHOES FOR HOME USE    Arthritis of foot  -     DIABETIC SHOES FOR HOME USE    Neuroma - Left Foot    Pain in both feet  -     X-Ray Foot Complete Bilateral      No follow-ups on file.    Procedures          Counseling:     I provided patient education verbally regarding:   Patient diagnosis, treatment options, as well as alternatives, risks, and benefits.     This note was created using Dragon voice recognition software that occasionally misinterpreted phrases or words.

## 2022-11-28 ENCOUNTER — OFFICE VISIT (OUTPATIENT)
Dept: URGENT CARE | Facility: CLINIC | Age: 52
End: 2022-11-28
Payer: COMMERCIAL

## 2022-11-28 VITALS
OXYGEN SATURATION: 96 % | DIASTOLIC BLOOD PRESSURE: 80 MMHG | HEART RATE: 84 BPM | WEIGHT: 293 LBS | TEMPERATURE: 98 F | SYSTOLIC BLOOD PRESSURE: 127 MMHG | BODY MASS INDEX: 47.09 KG/M2 | RESPIRATION RATE: 20 BRPM | HEIGHT: 66 IN

## 2022-11-28 DIAGNOSIS — L50.9 HIVES: ICD-10-CM

## 2022-11-28 DIAGNOSIS — R21 RASH: Primary | ICD-10-CM

## 2022-11-28 PROCEDURE — 99213 OFFICE O/P EST LOW 20 MIN: CPT | Mod: 25,S$GLB,, | Performed by: NURSE PRACTITIONER

## 2022-11-28 PROCEDURE — 99213 PR OFFICE/OUTPT VISIT, EST, LEVL III, 20-29 MIN: ICD-10-PCS | Mod: 25,S$GLB,, | Performed by: NURSE PRACTITIONER

## 2022-11-28 PROCEDURE — 4010F ACE/ARB THERAPY RXD/TAKEN: CPT | Mod: CPTII,S$GLB,, | Performed by: NURSE PRACTITIONER

## 2022-11-28 PROCEDURE — 96372 THER/PROPH/DIAG INJ SC/IM: CPT | Mod: S$GLB,,, | Performed by: NURSE PRACTITIONER

## 2022-11-28 PROCEDURE — 3074F SYST BP LT 130 MM HG: CPT | Mod: CPTII,S$GLB,, | Performed by: NURSE PRACTITIONER

## 2022-11-28 PROCEDURE — 3008F BODY MASS INDEX DOCD: CPT | Mod: CPTII,S$GLB,, | Performed by: NURSE PRACTITIONER

## 2022-11-28 PROCEDURE — 96372 PR INJECTION,THERAP/PROPH/DIAG2ST, IM OR SUBCUT: ICD-10-PCS | Mod: S$GLB,,, | Performed by: NURSE PRACTITIONER

## 2022-11-28 PROCEDURE — 3074F PR MOST RECENT SYSTOLIC BLOOD PRESSURE < 130 MM HG: ICD-10-PCS | Mod: CPTII,S$GLB,, | Performed by: NURSE PRACTITIONER

## 2022-11-28 PROCEDURE — 1159F MED LIST DOCD IN RCRD: CPT | Mod: CPTII,S$GLB,, | Performed by: NURSE PRACTITIONER

## 2022-11-28 PROCEDURE — 3079F PR MOST RECENT DIASTOLIC BLOOD PRESSURE 80-89 MM HG: ICD-10-PCS | Mod: CPTII,S$GLB,, | Performed by: NURSE PRACTITIONER

## 2022-11-28 PROCEDURE — 4010F PR ACE/ARB THEARPY RXD/TAKEN: ICD-10-PCS | Mod: CPTII,S$GLB,, | Performed by: NURSE PRACTITIONER

## 2022-11-28 PROCEDURE — 3008F PR BODY MASS INDEX (BMI) DOCUMENTED: ICD-10-PCS | Mod: CPTII,S$GLB,, | Performed by: NURSE PRACTITIONER

## 2022-11-28 PROCEDURE — 1160F RVW MEDS BY RX/DR IN RCRD: CPT | Mod: CPTII,S$GLB,, | Performed by: NURSE PRACTITIONER

## 2022-11-28 PROCEDURE — 1160F PR REVIEW ALL MEDS BY PRESCRIBER/CLIN PHARMACIST DOCUMENTED: ICD-10-PCS | Mod: CPTII,S$GLB,, | Performed by: NURSE PRACTITIONER

## 2022-11-28 PROCEDURE — 3079F DIAST BP 80-89 MM HG: CPT | Mod: CPTII,S$GLB,, | Performed by: NURSE PRACTITIONER

## 2022-11-28 PROCEDURE — 1159F PR MEDICATION LIST DOCUMENTED IN MEDICAL RECORD: ICD-10-PCS | Mod: CPTII,S$GLB,, | Performed by: NURSE PRACTITIONER

## 2022-11-28 RX ORDER — DEXAMETHASONE SODIUM PHOSPHATE 4 MG/ML
8 INJECTION, SOLUTION INTRA-ARTICULAR; INTRALESIONAL; INTRAMUSCULAR; INTRAVENOUS; SOFT TISSUE
Status: COMPLETED | OUTPATIENT
Start: 2022-11-28 | End: 2022-11-28

## 2022-11-28 RX ORDER — NYSTATIN 100000 [USP'U]/G
POWDER TOPICAL
COMMUNITY
Start: 2022-11-22

## 2022-11-28 RX ORDER — TRIAMCINOLONE ACETONIDE 1 MG/G
OINTMENT TOPICAL 2 TIMES DAILY
Qty: 80 G | Refills: 0 | Status: SHIPPED | OUTPATIENT
Start: 2022-11-28 | End: 2022-12-05

## 2022-11-28 RX ADMIN — DEXAMETHASONE SODIUM PHOSPHATE 8 MG: 4 INJECTION, SOLUTION INTRA-ARTICULAR; INTRALESIONAL; INTRAMUSCULAR; INTRAVENOUS; SOFT TISSUE at 06:11

## 2022-11-28 NOTE — PROGRESS NOTES
"Subjective:       Patient ID: Gayatri Dela Cruz is a 51 y.o. female.    Vitals:  height is 5' 6" (1.676 m) and weight is 134.4 kg (296 lb 6.4 oz). Her temperature is 98 °F (36.7 °C). Her blood pressure is 127/80 and her pulse is 84. Her respiration is 20 and oxygen saturation is 96%.     Chief Complaint: Rash    Denies new medications.  Denies known exposures, precipitating events or occurrences.  Denies new products, soaps, detergents.  Onset of symptoms 2 weeks ago primarily to her chest and breast.  States that she was given nystatin powder from her oncologist for complaints of itching and rash which have not helped at all    Rash  The current episode started 1 to 4 weeks ago (2 weeks). The affected locations include the abdomen, chest and right arm. The rash is characterized by itchiness. Pertinent negatives include no shortness of breath.     HENT:  Negative for trouble swallowing.    Respiratory:  Negative for chest tightness, shortness of breath, stridor and wheezing.    Skin:  Positive for rash and hives.   Allergic/Immunologic: Positive for hives and itching.     Objective:      Physical Exam   Constitutional: She is oriented to person, place, and time. She appears well-developed.  Non-toxic appearance. She does not appear ill. No distress. obesity  HENT:   Head: Normocephalic and atraumatic.   Nose: Nose normal.   Mouth/Throat: Oropharynx is clear and moist. Mucous membranes are moist.   Eyes: Conjunctivae and EOM are normal.   Cardiovascular: Normal rate.   Pulmonary/Chest: Effort normal. No respiratory distress.   Abdominal: Normal appearance.   Neurological: no focal deficit. She is alert and oriented to person, place, and time.   Skin: Skin is warm, dry and rash. Capillary refill takes 2 to 3 seconds.         Comments: See attached photos   Psychiatric: Her behavior is normal. Mood normal.   Nursing note and vitals reviewed.                  Assessment:       1. Rash    2. Hives          Last a1c " 7.7  Plan:         Rash  -     triamcinolone acetonide 0.1% (KENALOG) 0.1 % ointment; Apply topically 2 (two) times daily. for 7 days  Dispense: 80 g; Refill: 0    Hives  -     dexAMETHasone injection 8 mg  -     triamcinolone acetonide 0.1% (KENALOG) 0.1 % ointment; Apply topically 2 (two) times daily. for 7 days  Dispense: 80 g; Refill: 0       Continue taking Zyrtec daily as already prescribed  Triamcinolone ointment to the affected areas twice a day for 7 days  Pepcid 20 mg twice a day until symptoms have resolved  Benadryl 25-50 mg every 4-6 hours as needed  Strict diabetic diet    Personal protection against illness for 1-2 weeks due to lowered immune system from steroid therapy.  Please wear a mask and eye protection around others and wash hands often

## 2022-11-29 NOTE — PATIENT INSTRUCTIONS
Continue taking Zyrtec daily as already prescribed  Triamcinolone ointment to the affected areas twice a day for 7 days  Pepcid 20 mg twice a day until symptoms have resolved  Benadryl 25-50 mg every 4-6 hours as needed  Strict diabetic diet    Personal protection against illness for 1-2 weeks due to lowered immune system from steroid therapy.  Please wear a mask and eye protection around others and wash hands often

## 2024-06-05 LAB
LEFT EYE DM RETINOPATHY: POSITIVE
RIGHT EYE DM RETINOPATHY: POSITIVE

## 2024-09-25 ENCOUNTER — OFFICE VISIT (OUTPATIENT)
Dept: URGENT CARE | Facility: CLINIC | Age: 54
End: 2024-09-25
Payer: COMMERCIAL

## 2024-09-25 VITALS
OXYGEN SATURATION: 98 % | WEIGHT: 260 LBS | HEIGHT: 66 IN | TEMPERATURE: 97 F | DIASTOLIC BLOOD PRESSURE: 84 MMHG | HEART RATE: 81 BPM | BODY MASS INDEX: 41.78 KG/M2 | SYSTOLIC BLOOD PRESSURE: 143 MMHG | RESPIRATION RATE: 16 BRPM

## 2024-09-25 DIAGNOSIS — S21.102A OPEN WOUND OF LEFT CHEST WALL, INITIAL ENCOUNTER: ICD-10-CM

## 2024-09-25 DIAGNOSIS — L03.313 CELLULITIS OF CHEST WALL: Primary | ICD-10-CM

## 2024-09-25 PROCEDURE — 99214 OFFICE O/P EST MOD 30 MIN: CPT | Mod: S$GLB,,, | Performed by: NURSE PRACTITIONER

## 2024-09-25 RX ORDER — OMEGA-3 FATTY ACIDS 1000 MG
1 CAPSULE ORAL 2 TIMES DAILY
COMMUNITY

## 2024-09-25 RX ORDER — SITAGLIPTIN 25 MG/1
25 TABLET, FILM COATED ORAL
COMMUNITY
Start: 2024-04-04

## 2024-09-25 RX ORDER — MUPIROCIN 20 MG/G
OINTMENT TOPICAL 3 TIMES DAILY
Qty: 22 G | Refills: 0 | Status: SHIPPED | OUTPATIENT
Start: 2024-09-25 | End: 2024-10-02

## 2024-09-25 RX ORDER — DOXYCYCLINE 100 MG/1
100 CAPSULE ORAL EVERY 12 HOURS
Qty: 20 CAPSULE | Refills: 0 | Status: SHIPPED | OUTPATIENT
Start: 2024-09-25 | End: 2024-10-05

## 2024-09-25 NOTE — PATIENT INSTRUCTIONS
Increase clear fluid intake    Start doxycycline and take as prescribed.  Take each dose with a large glass of water.  Do not lie flat for 30 minutes after taking.  Avoid prolonged sun exposure while taking doxycycline.  If you are forced to be in the sun for extended periods of time please use a sunscreen with an SPF of 50 or higher to protect against sunburn.       Keep wound clean and covered until healed  Wash wound with clean, soapy water and change dressing daily  Apply mupirocin ointment 2-3 x daily and with dressing changes  Follow up with PCP  Go directly to the er for any worsening, or emergent concerns  Return to clinic for new concerns.      Further testing has been ordered on your wound drainage.  It will take 3-5 days to receive these results.  Once the results have been received someone from this office will contact you and discuss any further changes in your plan of care.

## 2024-09-25 NOTE — PROGRESS NOTES
"Subjective:      Patient ID: Gayatri Dela Cruz is a 53 y.o. female.    Vitals:  height is 5' 6" (1.676 m) and weight is 117.9 kg (260 lb). Her temperature is 97.2 °F (36.2 °C). Her blood pressure is 143/84 (abnormal) and her pulse is 81. Her respiration is 16 and oxygen saturation is 98%.     Chief Complaint: Recurrent Skin Infections    53-year-old female with a history of multiple abscesses seen today for red and painful area between her breasts.  She states she has had a small "bump "there for several months, however over the last 3-4 days it has gotten larger, more painful, and is now "hard".  It began to drain malodorous discharge yesterday.  She denies fever        Constitution: Negative for chills and fever.   Cardiovascular:  Negative for chest pain, palpitations and sob on exertion.   Respiratory:  Negative for shortness of breath.    Gastrointestinal:  Negative for nausea and vomiting.   Skin:  Positive for wound, skin thickening/induration and erythema. Negative for rash.   Neurological:  Negative for dizziness, light-headedness, passing out, disorientation and altered mental status.   Psychiatric/Behavioral:  Negative for altered mental status, disorientation and confusion.       Objective:     Physical Exam   Constitutional: She is oriented to person, place, and time. She appears well-developed. She is cooperative.  Non-toxic appearance. She does not appear ill. No distress.   HENT:   Head: Normocephalic and atraumatic.   Ears:   Right Ear: External ear normal.   Left Ear: External ear normal.   Nose: Nose normal.   Mouth/Throat: Oropharynx is clear and moist and mucous membranes are normal. Mucous membranes are moist.   Eyes: Conjunctivae and lids are normal. No scleral icterus.   Neck: Trachea normal and phonation normal. Neck supple.   Cardiovascular: Normal rate, regular rhythm, normal heart sounds and normal pulses.   Pulmonary/Chest: Effort normal and breath sounds normal. No respiratory " distress.       Abdominal: Normal appearance.   Musculoskeletal:         General: No deformity.   Neurological: no focal deficit. She is alert and oriented to person, place, and time. She has normal strength and normal reflexes. No sensory deficit.   Skin: Skin is warm, dry, intact and not diaphoretic. Capillary refill takes 2 to 3 seconds. erythema   Psychiatric: Her speech is normal and behavior is normal. Judgment and thought content normal.   Nursing note and vitals reviewed.      Assessment:     1. Cellulitis of chest wall    2. Open wound of left chest wall, initial encounter        Plan:       Cellulitis of chest wall  -     doxycycline (VIBRAMYCIN) 100 MG Cap; Take 1 capsule (100 mg total) by mouth every 12 (twelve) hours. for 10 days  Dispense: 20 capsule; Refill: 0    Open wound of left chest wall, initial encounter  -     mupirocin (BACTROBAN) 2 % ointment; Apply topically 3 (three) times daily. for 7 days  Dispense: 22 g; Refill: 0  -     CULTURE, AEROBIC  (SPECIFY SOURCE)      The physical exam findings were discussed with the patient and all questions answered.  I do not appreciate any areas of fluctuance or need for incision and drainage.  We discussed symptom monitoring, conservative care methods, medication use, and follow up orders. she verbalized understanding and agreement with the plan of care.

## 2024-09-27 ENCOUNTER — HOSPITAL ENCOUNTER (EMERGENCY)
Facility: HOSPITAL | Age: 54
Discharge: HOME | End: 2024-09-27
Attending: EMERGENCY MEDICINE
Payer: COMMERCIAL

## 2024-09-27 VITALS
SYSTOLIC BLOOD PRESSURE: 157 MMHG | OXYGEN SATURATION: 97 % | HEIGHT: 66 IN | WEIGHT: 260 LBS | DIASTOLIC BLOOD PRESSURE: 79 MMHG | BODY MASS INDEX: 41.78 KG/M2 | TEMPERATURE: 96 F | RESPIRATION RATE: 18 BRPM | HEART RATE: 81 BPM

## 2024-09-27 DIAGNOSIS — L02.213 CHEST WALL ABSCESS: Primary | ICD-10-CM

## 2024-09-27 PROCEDURE — 99284 EMERGENCY DEPT VISIT MOD MDM: CPT | Mod: 25

## 2024-09-27 PROCEDURE — 25000003 PHARM REV CODE 250: Performed by: EMERGENCY MEDICINE

## 2024-09-27 PROCEDURE — 87070 CULTURE OTHR SPECIMN AEROBIC: CPT | Performed by: PHYSICIAN ASSISTANT

## 2024-09-27 PROCEDURE — 25000003 PHARM REV CODE 250: Performed by: PHYSICIAN ASSISTANT

## 2024-09-27 PROCEDURE — 10060 I&D ABSCESS SIMPLE/SINGLE: CPT

## 2024-09-27 RX ORDER — SULFAMETHOXAZOLE AND TRIMETHOPRIM 800; 160 MG/1; MG/1
1 TABLET ORAL
Status: DISCONTINUED | OUTPATIENT
Start: 2024-09-27 | End: 2024-09-27 | Stop reason: HOSPADM

## 2024-09-27 RX ORDER — SULFAMETHOXAZOLE AND TRIMETHOPRIM 800; 160 MG/1; MG/1
1 TABLET ORAL
Status: COMPLETED | OUTPATIENT
Start: 2024-09-27 | End: 2024-09-27

## 2024-09-27 RX ORDER — HYDROCODONE BITARTRATE AND ACETAMINOPHEN 5; 325 MG/1; MG/1
1 TABLET ORAL EVERY 6 HOURS PRN
Qty: 12 TABLET | Refills: 0 | Status: SHIPPED | OUTPATIENT
Start: 2024-09-27 | End: 2024-09-30

## 2024-09-27 RX ORDER — SULFAMETHOXAZOLE AND TRIMETHOPRIM 800; 160 MG/1; MG/1
1 TABLET ORAL 2 TIMES DAILY
Qty: 14 TABLET | Refills: 0 | Status: SHIPPED | OUTPATIENT
Start: 2024-09-27 | End: 2024-10-04

## 2024-09-27 RX ORDER — LIDOCAINE HYDROCHLORIDE 10 MG/ML
10 INJECTION, SOLUTION INFILTRATION; PERINEURAL
Status: COMPLETED | OUTPATIENT
Start: 2024-09-27 | End: 2024-09-27

## 2024-09-27 RX ADMIN — SULFAMETHOXAZOLE AND TRIMETHOPRIM 1 TABLET: 800; 160 TABLET ORAL at 04:09

## 2024-09-27 RX ADMIN — LIDOCAINE HYDROCHLORIDE 10 ML: 10 INJECTION, SOLUTION INFILTRATION; PERINEURAL at 03:09

## 2024-09-27 NOTE — ED NOTES
Gayatri Dela Cruz presents to the ED with c/o abscess to left breast. Patient reports that she was seen at  and given PO antibiotics and bactroban but is not having any relief. She states that the site is getting worse and hurts more when bending over at work. Patient denies any fever at home and is afebrile upon arrival to the ED.    Mucous membranes are pink and moist. Skin is warm, dry and intact. .  AJ VSS  Verified patient's name and date of birth.

## 2024-09-27 NOTE — ED NOTES
Upon discharge, patient is AAOx4, no cardiac or respiratory complications. Follow up care and  Medications have been reviewed with patient and has been instructed to return to the ER if needed. Patient verbalized understanding and ambulated to the lobby without difficulty. ESTELLE ROCHA.  AVS has been signed by patient and will be placed in the bin to be scanned by medical records.

## 2024-09-28 NOTE — ED PROVIDER NOTES
Encounter Date: 9/27/2024       History     Chief Complaint   Patient presents with    Abscess     On chest / presently on antibiotics      Gayatri Dela Cruz is a 53 y.o. female presenting for evaluation of persistent and worsening redness, swelling and pain to her chest wall over the last few days.  She was initially evaluated at Urgent Care and was prescribed Doxycycline.  She doesn't feel as if there has been any improvement.  She has noticed no drainage or bleeding from the area.   She has a past medical history of Diabetes mellitus, Hypertension, and Morbid (severe) obesity due to excess calories.      The history is provided by the patient.     Review of patient's allergies indicates:  No Known Allergies  Past Medical History:   Diagnosis Date    Diabetes mellitus     Hypertension     Morbid (severe) obesity due to excess calories      Past Surgical History:   Procedure Laterality Date    HYSTERECTOMY  2019     No family history on file.  Social History     Tobacco Use    Smoking status: Never    Smokeless tobacco: Never   Substance Use Topics    Alcohol use: Yes     Comment: social    Drug use: No     Review of Systems   Constitutional:  Negative for chills and fever.   Musculoskeletal:  Negative for arthralgias, back pain, joint swelling, myalgias, neck pain and neck stiffness.   Skin:  Positive for wound. Negative for color change, pallor and rash.   Neurological:  Negative for weakness and numbness.   Hematological:  Does not bruise/bleed easily.       Physical Exam     Initial Vitals [09/27/24 1411]   BP Pulse Resp Temp SpO2   (!) 157/79 81 18 96.2 °F (35.7 °C) 97 %      MAP       --         Physical Exam    Nursing note and vitals reviewed.  Constitutional: She appears well-developed and well-nourished. She is not diaphoretic. No distress.   HENT:   Head: Normocephalic and atraumatic.   Cardiovascular:  Intact distal pulses.           Musculoskeletal:         General: No tenderness. Normal range of  motion.     Neurological: She is alert and oriented to person, place, and time. She has normal strength. No sensory deficit.   Skin: Skin is warm and dry. Abscess noted. No rash noted. There is erythema.   Moderately sized area of induration, erythema, warmth noted to anterior chest wall/sternum.  No active bleeding or discharge.     Psychiatric: She has a normal mood and affect.         ED Course   I & D - Incision and Drainage    Date/Time: 9/27/2024 2:06 PM  Location procedure was performed: [facility] EMERGENCY DEPARTMENT    Performed by: Payal Garza PA-C  Authorized by: Henry Tong III, MD  Type: abscess  Body area: trunk  Location details: chest  Anesthesia: local infiltration    Anesthesia:  Local Anesthetic: lidocaine 1% without epinephrine  Anesthetic total: 3 mL    Patient sedated: no  Scalpel size: 11  Incision type: single straight  Incision depth: dermal  Complexity: complex  Drainage: serosanguinous and purulent  Drainage amount: copious  Wound treatment: drainage, incision, expression of material, deloculation and wound packed  Patient tolerance: Patient tolerated the procedure well with no immediate complications    Incision depth: dermal        Labs Reviewed   CULTURE, AEROBIC  (SPECIFY SOURCE)          Imaging Results    None          Medications   LIDOcaine HCL 10 mg/ml (1%) injection 10 mL (10 mLs Infiltration Given 9/27/24 1508)   sulfamethoxazole-trimethoprim 800-160mg per tablet 1 tablet (1 tablet Oral Given 9/27/24 1614)     Medical Decision Making  Differential Diagnosis:   Abscess  Cellulitis   Folliculitis     Pt emergently evaluated here in the ED.    Symptoms consistent with abscess and surrounding cellulitis.  Bedside US, performed by myself shows evidence for fluid collection.  She tolerated the I&D well.  Aerobic culture pending.  She will be discharged home on Bactrim.  She is encouraged to follow-up with her PCP for re-evaluation and further management next week.  Patient  voices understanding and is agreeable to the plan.  Specific return precautions are given.       Amount and/or Complexity of Data Reviewed  Labs: ordered. Decision-making details documented in ED Course.    Risk  Prescription drug management.                                      Clinical Impression:  Final diagnoses:  [L02.213] Chest wall abscess (Primary)          ED Disposition Condition    Discharge Stable          ED Prescriptions       Medication Sig Dispense Start Date End Date Auth. Provider    sulfamethoxazole-trimethoprim 800-160mg (BACTRIM DS) 800-160 mg Tab Take 1 tablet by mouth 2 (two) times daily. for 7 days 14 tablet 9/27/2024 10/4/2024 Payal Garza PA-C    HYDROcodone-acetaminophen (NORCO) 5-325 mg per tablet Take 1 tablet by mouth every 6 (six) hours as needed for Pain. 12 tablet 9/27/2024 9/30/2024 Payal Garza PA-C          Follow-up Information       Follow up With Specialties Details Why Contact Info Additional Information    [facility] -  Emergency Medicine  As needed, If symptoms worsen [address] [place] [address] [address] [place] [id number] 1st floor             Payal Garza PA-C  09/27/24 8287

## 2024-10-01 LAB — BACTERIA SPEC AEROBE CULT: NO GROWTH

## 2024-10-08 ENCOUNTER — NURSE TRIAGE (OUTPATIENT)
Dept: ADMINISTRATIVE | Facility: CLINIC | Age: 54
End: 2024-10-08
Payer: COMMERCIAL

## 2024-10-08 NOTE — TELEPHONE ENCOUNTER
Pt calling for boil  under breast and she has been seen in the ED for this and would be a new pt for [facility]. Pt triaged and care advice is to go to office today and no available appt and offered her UC to be seen. Pt told to call back if any other questions or concerns pt denies any other needs at this time.  No pcp [facility]                Reason for Disposition   Boil > 1/2 inch across (> 12 mm; larger than a marble) and center is soft or pus colored    Additional Information   Negative: Widespread rash and bright red, sunburn-like and too weak to stand   Negative: Sounds like a life-threatening emergency to the triager   Negative: Widespread red rash   Negative: Black (necrotic), dark purple, or blisters develop in area of boil   Negative: Patient sounds very sick or weak to the triager   Negative: SEVERE pain (e.g., excruciating)   Negative: Red streak from area of infection   Negative: Fever > 100.4 F (38.0 C)   Negative: Boil > 2 inches across (> 5 cm; larger than a golf ball or ping pong ball)    Protocols used: Boil (Skin Abscess)-A-OH

## 2024-10-24 DIAGNOSIS — E11.9 TYPE 2 DIABETES MELLITUS WITHOUT COMPLICATION, WITH LONG-TERM CURRENT USE OF INSULIN: ICD-10-CM

## 2024-10-24 DIAGNOSIS — E78.5 HYPERLIPIDEMIA, UNSPECIFIED HYPERLIPIDEMIA TYPE: ICD-10-CM

## 2024-10-24 DIAGNOSIS — I10 HYPERTENSION, UNSPECIFIED TYPE: ICD-10-CM

## 2024-10-24 DIAGNOSIS — E66.01 MORBID OBESITY: ICD-10-CM

## 2024-10-24 DIAGNOSIS — M19.90 OSTEOARTHRITIS, UNSPECIFIED OSTEOARTHRITIS TYPE, UNSPECIFIED SITE: ICD-10-CM

## 2024-10-24 DIAGNOSIS — Z79.4 TYPE 2 DIABETES MELLITUS WITHOUT COMPLICATION, WITH LONG-TERM CURRENT USE OF INSULIN: ICD-10-CM

## 2024-10-24 DIAGNOSIS — K21.9 GASTROESOPHAGEAL REFLUX DISEASE, UNSPECIFIED WHETHER ESOPHAGITIS PRESENT: ICD-10-CM

## 2024-10-24 DIAGNOSIS — G47.30 SLEEP APNEA, UNSPECIFIED TYPE: ICD-10-CM

## 2024-11-01 ENCOUNTER — PATIENT MESSAGE (OUTPATIENT)
Dept: BARIATRICS | Facility: CLINIC | Age: 54
End: 2024-11-01
Payer: COMMERCIAL

## 2024-11-04 ENCOUNTER — PATIENT MESSAGE (OUTPATIENT)
Dept: BARIATRICS | Facility: CLINIC | Age: 54
End: 2024-11-04
Payer: COMMERCIAL

## 2024-11-05 DIAGNOSIS — E66.01 MORBID OBESITY: ICD-10-CM

## 2024-11-05 DIAGNOSIS — K21.9 GASTROESOPHAGEAL REFLUX DISEASE, UNSPECIFIED WHETHER ESOPHAGITIS PRESENT: ICD-10-CM

## 2024-11-05 DIAGNOSIS — G47.30 SLEEP APNEA, UNSPECIFIED TYPE: ICD-10-CM

## 2024-11-05 DIAGNOSIS — M19.90 OSTEOARTHRITIS, UNSPECIFIED OSTEOARTHRITIS TYPE, UNSPECIFIED SITE: ICD-10-CM

## 2024-11-05 DIAGNOSIS — E11.9 TYPE 2 DIABETES MELLITUS WITHOUT COMPLICATION, WITH LONG-TERM CURRENT USE OF INSULIN: ICD-10-CM

## 2024-11-05 DIAGNOSIS — I10 HYPERTENSION, UNSPECIFIED TYPE: ICD-10-CM

## 2024-11-05 DIAGNOSIS — E78.5 HYPERLIPIDEMIA, UNSPECIFIED HYPERLIPIDEMIA TYPE: ICD-10-CM

## 2024-11-05 DIAGNOSIS — Z79.4 TYPE 2 DIABETES MELLITUS WITHOUT COMPLICATION, WITH LONG-TERM CURRENT USE OF INSULIN: ICD-10-CM

## 2024-11-08 ENCOUNTER — TELEPHONE (OUTPATIENT)
Dept: BARIATRICS | Facility: CLINIC | Age: 54
End: 2024-11-08
Payer: COMMERCIAL

## 2024-11-08 ENCOUNTER — PATIENT MESSAGE (OUTPATIENT)
Dept: BARIATRICS | Facility: CLINIC | Age: 54
End: 2024-11-08
Payer: COMMERCIAL

## 2024-11-08 DIAGNOSIS — E66.01 MORBID OBESITY: Primary | ICD-10-CM

## 2024-11-08 DIAGNOSIS — E11.9 TYPE 2 DIABETES MELLITUS WITHOUT COMPLICATION, WITH LONG-TERM CURRENT USE OF INSULIN: ICD-10-CM

## 2024-11-08 DIAGNOSIS — Z01.818 PRE-OP EVALUATION: ICD-10-CM

## 2024-11-08 DIAGNOSIS — Z79.4 TYPE 2 DIABETES MELLITUS WITHOUT COMPLICATION, WITH LONG-TERM CURRENT USE OF INSULIN: ICD-10-CM

## 2024-11-08 RX ORDER — EMPAGLIFLOZIN 10 MG/1
TABLET, FILM COATED ORAL
COMMUNITY
Start: 2024-10-10

## 2024-11-08 NOTE — TELEPHONE ENCOUNTER
"Phoned patient. Reviewed history and explained ABDON process.     ABDON Patient- Surgeon- Rc  Surgery- interested in bypass  Completed Online Seminar- 10/23/24    Wants to wait til 2025 to have surgery. Has consult scheduled for 11/20.    PCP: Dr. Jt Betts   moving to [facility]- has appointment to establish care with Chelsie Temple MD on 11/12.    BMI: 44.1 HT: 5'6" WT: 273 lb 3.2 oz    PMH:  Morbid obesity   DM2- A1c 8.8 on 5/30/24 (A1c lab scheduled 11/11) on metformin, actos and added jardiance on 10/10  HTN- above goal at PCP visit 10/10- increased lisinopril to 40 mg daily   Dyslipidemia  HLD- crestor   Anemia- blood transfusion in the past related to cancer.   Endometrial cancer- hem/onc note 5/30/24- "Stage IB endometrioid adenocarcinoma with isolated tumor cells, 5 years since completion of adjuvant radiation" due for follow up around 11/30.   CHATO- couldn't afford machine   Varicose veins- L lower leg     Reports swelling in hands, noticed within the last year. Thinks it has to do with the machine she uses at work     Denies other known heart, lung, liver, kidney problems. Reports fatty liver that was seen on ultrasound years ago.     COVID: yes once but unsure when, no symptoms   Vaccinated: yes     GERD Questionnaire    PPI: no   Typical heartburn: no   Regurgitation: rarely   Dysphagia solids: no   Dysphagia liquids: no   Hoarseness: no   Sore throat: no   Cough: no   Asthma: no   Chest pain: no   Water brash: no   Globus: no   Nausea: no   Vomiting: no      Smoker- any form of Nicotine or Marijuana: marijuana during halloween but not a regular thing. No nicotine.     Take a flight of stairs without SOB- no; walks about 9 miles when she works- going up and down is harder- stress test scheduled 11/14  Can ambulate without assistance- yes      ROS:  Fever/infection: skin abscess over chest- Seen at urgent care 9/24, recived doxy, no improvement, Seen in ED 9/27, Took 7 days of bactrim, finishing " 10/4, 10/10 rx another round of bactrim for continued cellulitis. Reports doing better now.  Dental: no, has partial   Sudden vision change: no, has glasses  Cough/resp problems: no  BM: regular   Abnormal bleeding: no  UTI: yes, last one approx 5 months ago  Recent steroid tx: no  Stroke: no  Seizure: no  Blood thinners: low dose aspirin   Blood clot: no  Psych hx: no    Past Surgeries:  EGD 24 (requested records)  Hysterectomy     Anesthesia problems: none    Fam hx:  Anesthesia problems: no  Heart problems: father had triple bypass at a later age (, had pulmonary fibrosis), paternal grandfather  of MI in his 90's  Clots: mom (reports  from blood clot traveling from her leg to lungs)    Reviewed medications, allergies, and preferred pharmacy in [facility].  Currently taking: Jardiance, aspirin, B12, fish oil, lisinopril, metformin, multivitamin, actos and crestor. Unable to update med rec in epic.     Works at [employer] as online .   Lives with .   Caregiver will be , Farhan.     Will route chart to MD's.

## 2024-11-11 ENCOUNTER — PATIENT MESSAGE (OUTPATIENT)
Dept: BARIATRICS | Facility: CLINIC | Age: 54
End: 2024-11-11
Payer: COMMERCIAL

## 2024-11-11 ENCOUNTER — HOSPITAL ENCOUNTER (OUTPATIENT)
Dept: RADIOLOGY | Facility: CLINIC | Age: 54
Discharge: HOME | End: 2024-11-11
Attending: SURGERY
Payer: COMMERCIAL

## 2024-11-11 ENCOUNTER — LAB VISIT (OUTPATIENT)
Dept: LAB | Facility: HOSPITAL | Age: 54
End: 2024-11-11
Payer: COMMERCIAL

## 2024-11-11 ENCOUNTER — TELEPHONE (OUTPATIENT)
Dept: BARIATRICS | Facility: CLINIC | Age: 54
End: 2024-11-11
Payer: COMMERCIAL

## 2024-11-11 DIAGNOSIS — I10 HYPERTENSION, UNSPECIFIED TYPE: ICD-10-CM

## 2024-11-11 DIAGNOSIS — E78.5 HYPERLIPIDEMIA, UNSPECIFIED HYPERLIPIDEMIA TYPE: ICD-10-CM

## 2024-11-11 DIAGNOSIS — G47.30 SLEEP APNEA, UNSPECIFIED TYPE: ICD-10-CM

## 2024-11-11 DIAGNOSIS — M19.90 OSTEOARTHRITIS, UNSPECIFIED OSTEOARTHRITIS TYPE, UNSPECIFIED SITE: ICD-10-CM

## 2024-11-11 DIAGNOSIS — E66.01 MORBID OBESITY: ICD-10-CM

## 2024-11-11 DIAGNOSIS — K21.9 GASTROESOPHAGEAL REFLUX DISEASE, UNSPECIFIED WHETHER ESOPHAGITIS PRESENT: ICD-10-CM

## 2024-11-11 DIAGNOSIS — Z79.4 TYPE 2 DIABETES MELLITUS WITHOUT COMPLICATION, WITH LONG-TERM CURRENT USE OF INSULIN: ICD-10-CM

## 2024-11-11 DIAGNOSIS — E11.9 TYPE 2 DIABETES MELLITUS WITHOUT COMPLICATION, WITH LONG-TERM CURRENT USE OF INSULIN: ICD-10-CM

## 2024-11-11 LAB
ALBUMIN SERPL BCP-MCNC: 4.1 G/DL (ref 3.5–5.2)
ALP SERPL-CCNC: 87 U/L (ref 40–150)
ALT SERPL W/O P-5'-P-CCNC: 30 U/L (ref 10–44)
ANION GAP SERPL CALC-SCNC: 11 MMOL/L (ref 8–16)
AST SERPL-CCNC: 21 U/L (ref 10–40)
BILIRUB SERPL-MCNC: 0.6 MG/DL (ref 0.1–1)
BUN SERPL-MCNC: 16 MG/DL (ref 6–20)
CALCIUM SERPL-MCNC: 9.4 MG/DL (ref 8.7–10.5)
CHLORIDE SERPL-SCNC: 108 MMOL/L (ref 95–110)
CO2 SERPL-SCNC: 21 MMOL/L (ref 23–29)
CREAT SERPL-MCNC: 0.8 MG/DL (ref 0.5–1.4)
EST. GFR  (NO RACE VARIABLE): >60 ML/MIN/1.73 M^2
ESTIMATED AVG GLUCOSE: 194 MG/DL (ref 68–131)
GLUCOSE SERPL-MCNC: 176 MG/DL (ref 70–110)
HBA1C MFR BLD: 8.4 % (ref 4.5–6.2)
INR PPP: 1 (ref 0.8–1.2)
MAGNESIUM SERPL-MCNC: 2 MG/DL (ref 1.6–2.6)
PHOSPHATE SERPL-MCNC: 3.4 MG/DL (ref 2.7–4.5)
POTASSIUM SERPL-SCNC: 4.2 MMOL/L (ref 3.5–5.1)
PROT SERPL-MCNC: 7.3 G/DL (ref 6–8.4)
PROTHROMBIN TIME: 10.4 SEC (ref 9–12.5)
SODIUM SERPL-SCNC: 140 MMOL/L (ref 136–145)
T4 FREE SERPL-MCNC: 0.84 NG/DL (ref 0.71–1.51)
VIT B12 SERPL-MCNC: 1052 PG/ML (ref 210–950)

## 2024-11-11 PROCEDURE — 83735 ASSAY OF MAGNESIUM: CPT | Performed by: SURGERY

## 2024-11-11 PROCEDURE — 84439 ASSAY OF FREE THYROXINE: CPT | Performed by: SURGERY

## 2024-11-11 PROCEDURE — 84425 ASSAY OF VITAMIN B-1: CPT | Performed by: SURGERY

## 2024-11-11 PROCEDURE — 85610 PROTHROMBIN TIME: CPT | Performed by: SURGERY

## 2024-11-11 PROCEDURE — 84480 ASSAY TRIIODOTHYRONINE (T3): CPT | Performed by: SURGERY

## 2024-11-11 PROCEDURE — 84100 ASSAY OF PHOSPHORUS: CPT | Performed by: SURGERY

## 2024-11-11 PROCEDURE — 36415 COLL VENOUS BLD VENIPUNCTURE: CPT | Performed by: SURGERY

## 2024-11-11 PROCEDURE — 71046 X-RAY EXAM CHEST 2 VIEWS: CPT | Mod: 26,,, | Performed by: RADIOLOGY

## 2024-11-11 PROCEDURE — 82607 VITAMIN B-12: CPT | Performed by: SURGERY

## 2024-11-11 PROCEDURE — 80053 COMPREHEN METABOLIC PANEL: CPT | Performed by: SURGERY

## 2024-11-11 PROCEDURE — 71046 X-RAY EXAM CHEST 2 VIEWS: CPT | Mod: TC,FY,PO

## 2024-11-11 PROCEDURE — 83036 HEMOGLOBIN GLYCOSYLATED A1C: CPT | Performed by: SURGERY

## 2024-11-12 ENCOUNTER — OFFICE VISIT (OUTPATIENT)
Dept: FAMILY MEDICINE | Facility: CLINIC | Age: 54
End: 2024-11-12
Payer: COMMERCIAL

## 2024-11-12 ENCOUNTER — PATIENT MESSAGE (OUTPATIENT)
Dept: BARIATRICS | Facility: CLINIC | Age: 54
End: 2024-11-12
Payer: COMMERCIAL

## 2024-11-12 VITALS
BODY MASS INDEX: 43.36 KG/M2 | DIASTOLIC BLOOD PRESSURE: 74 MMHG | HEIGHT: 66 IN | RESPIRATION RATE: 18 BRPM | WEIGHT: 269.81 LBS | OXYGEN SATURATION: 97 % | SYSTOLIC BLOOD PRESSURE: 124 MMHG | HEART RATE: 71 BPM

## 2024-11-12 DIAGNOSIS — Z00.00 ROUTINE GENERAL MEDICAL EXAMINATION AT A HEALTH CARE FACILITY: Primary | ICD-10-CM

## 2024-11-12 DIAGNOSIS — E11.65 TYPE 2 DIABETES MELLITUS WITH HYPERGLYCEMIA, WITHOUT LONG-TERM CURRENT USE OF INSULIN: ICD-10-CM

## 2024-11-12 DIAGNOSIS — R79.89 HIGH SERUM VITAMIN B12: ICD-10-CM

## 2024-11-12 DIAGNOSIS — E11.42 DIABETIC POLYNEUROPATHY ASSOCIATED WITH TYPE 2 DIABETES MELLITUS: ICD-10-CM

## 2024-11-12 LAB — T3 SERPL IA-MCNC: 86 NG/DL (ref 80–200)

## 2024-11-12 PROCEDURE — 3052F HG A1C>EQUAL 8.0%<EQUAL 9.0%: CPT | Mod: CPTII,S$GLB,COE, | Performed by: STUDENT IN AN ORGANIZED HEALTH CARE EDUCATION/TRAINING PROGRAM

## 2024-11-12 PROCEDURE — 3008F BODY MASS INDEX DOCD: CPT | Mod: CPTII,S$GLB,COE, | Performed by: STUDENT IN AN ORGANIZED HEALTH CARE EDUCATION/TRAINING PROGRAM

## 2024-11-12 PROCEDURE — 1159F MED LIST DOCD IN RCRD: CPT | Mod: CPTII,S$GLB,COE, | Performed by: STUDENT IN AN ORGANIZED HEALTH CARE EDUCATION/TRAINING PROGRAM

## 2024-11-12 PROCEDURE — 99999 PR PBB SHADOW E&M-EST. PATIENT-LVL IV: CPT | Mod: PBBFAC,COE,, | Performed by: STUDENT IN AN ORGANIZED HEALTH CARE EDUCATION/TRAINING PROGRAM

## 2024-11-12 PROCEDURE — 3078F DIAST BP <80 MM HG: CPT | Mod: CPTII,S$GLB,COE, | Performed by: STUDENT IN AN ORGANIZED HEALTH CARE EDUCATION/TRAINING PROGRAM

## 2024-11-12 PROCEDURE — G2211 COMPLEX E/M VISIT ADD ON: HCPCS | Mod: S$GLB,COE,, | Performed by: STUDENT IN AN ORGANIZED HEALTH CARE EDUCATION/TRAINING PROGRAM

## 2024-11-12 PROCEDURE — 99214 OFFICE O/P EST MOD 30 MIN: CPT | Mod: S$GLB,COE,, | Performed by: STUDENT IN AN ORGANIZED HEALTH CARE EDUCATION/TRAINING PROGRAM

## 2024-11-12 PROCEDURE — 4010F ACE/ARB THERAPY RXD/TAKEN: CPT | Mod: CPTII,S$GLB,COE, | Performed by: STUDENT IN AN ORGANIZED HEALTH CARE EDUCATION/TRAINING PROGRAM

## 2024-11-12 PROCEDURE — 3074F SYST BP LT 130 MM HG: CPT | Mod: CPTII,S$GLB,COE, | Performed by: STUDENT IN AN ORGANIZED HEALTH CARE EDUCATION/TRAINING PROGRAM

## 2024-11-12 PROCEDURE — 1160F RVW MEDS BY RX/DR IN RCRD: CPT | Mod: CPTII,S$GLB,COE, | Performed by: STUDENT IN AN ORGANIZED HEALTH CARE EDUCATION/TRAINING PROGRAM

## 2024-11-12 RX ORDER — GABAPENTIN 400 MG/1
800 CAPSULE ORAL NIGHTLY
Qty: 60 CAPSULE | Refills: 11 | Status: SHIPPED | OUTPATIENT
Start: 2024-11-12 | End: 2025-11-12

## 2024-11-12 RX ORDER — SITAGLIPTIN 50 MG/1
TABLET, FILM COATED ORAL
COMMUNITY
Start: 2024-10-01

## 2024-11-12 RX ORDER — LISINOPRIL 40 MG/1
TABLET ORAL
COMMUNITY
Start: 2024-10-10

## 2024-11-12 RX ORDER — SULFAMETHOXAZOLE AND TRIMETHOPRIM 800; 160 MG/1; MG/1
1 TABLET ORAL 2 TIMES DAILY
COMMUNITY
Start: 2024-10-10 | End: 2024-11-12 | Stop reason: ALTCHOICE

## 2024-11-12 NOTE — PROGRESS NOTES
[facility]  Office Visit Note     SUBJECTIVE:   HPI: Gayatri Dela Cruz  is a 53 y.o. female here to [facility] care    Medical conditions notable for diabetes - A1c last checked 11/11 at 8.4     Meds:  Rosuvastatin 20 mg daily, pioglitazone 30 mg daily, metformin 1000 mg BID, jardiance 10 mg daily    History of Present Illness    CHIEF COMPLAINT:  Patient presents today for follow-up and discussion of upcoming bariatric surgery.    UPCOMING BARIATRIC SURGERY:  She is scheduled for bariatric surgery, likely gastric bypass, on January 9th, 2025. She is aware of required pre-surgery appointments and tests, including a preoperative evaluation.     WEIGHT MANAGEMENT:  She reports a current weight of approximately 269-270 lbs. She discloses a history of weight fluctuations, having previously lost 50 lbs from a starting weight of 300 lbs, but subsequently regained 25 lbs. She acknowledges recent dietary changes, including reducing consumption of chips and sugar, recognizing the need for dietary modifications beyond relying on exercise for weight management.    DIABETES MANAGEMENT:  Her current A1C is 8.4, increased from 6.7 two years ago. She expresses concern about this change, indicating it as a motivating factor for considering surgery.    MEDICATIONS:  She is currently taking Rosuvastatin for cholesterol management. For diabetes control, she is on Actos, Metformin, Januvia, and Jardiance. She reports no longer using Victoza. She is also taking a B12 supplement every other day.    MEDICAL HISTORY:  She has a history of uterine cancer, for which she underwent a hysterectomy in 2019 at [facility]. She reports being cancer-free and follows up annually with her oncologist (Camila Vera). She denies any history of pregnancies.    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 She reports occasional marijuana use, most recently at [employer], but denies regular usage  and expresses minimal enjoyment from it. She reports minimal alcohol consumption.      Lab Visit on 11/11/2024   Component Date Value Ref Range Status    Hemoglobin A1C 11/11/2024 8.4 (H)  4.5 - 6.2 % Final    Estimated Avg Glucose 11/11/2024 194 (H)  68 - 131 mg/dL Final    Sodium 11/11/2024 140  136 - 145 mmol/L Final    Potassium 11/11/2024 4.2  3.5 - 5.1 mmol/L Final    Chloride 11/11/2024 108  95 - 110 mmol/L Final    CO2 11/11/2024 21 (L)  23 - 29 mmol/L Final    Glucose 11/11/2024 176 (H)  70 - 110 mg/dL Final    BUN 11/11/2024 16  6 - 20 mg/dL Final    Creatinine 11/11/2024 0.8  0.5 - 1.4 mg/dL Final    Calcium 11/11/2024 9.4  8.7 - 10.5 mg/dL Final    Total Protein 11/11/2024 7.3  6.0 - 8.4 g/dL Final    Albumin 11/11/2024 4.1  3.5 - 5.2 g/dL Final    Total Bilirubin 11/11/2024 0.6  0.1 - 1.0 mg/dL Final    Alkaline Phosphatase 11/11/2024 87  40 - 150 U/L Final    AST 11/11/2024 21  10 - 40 U/L Final    ALT 11/11/2024 30  10 - 44 U/L Final    eGFR 11/11/2024 >60  >60 mL/min/1.73 m^2 Final    Anion Gap 11/11/2024 11  8 - 16 mmol/L Final    Prothrombin Time 11/11/2024 10.4  9.0 - 12.5 sec Final    INR 11/11/2024 1.0  0.8 - 1.2 Final    Phosphorus 11/11/2024 3.4  2.7 - 4.5 mg/dL Final    Magnesium 11/11/2024 2.0  1.6 - 2.6 mg/dL Final    T3, Total 11/11/2024 86  80 - 200 ng/dL Final    Vitamin B-12 11/11/2024 1052 (H)  210 - 950 pg/mL Final    Free T4 11/11/2024 0.84  0.71 - 1.51 ng/dL Final   Admission on 09/27/2024, Discharged on 09/27/2024   Component Date Value Ref Range Status    Aerobic Bacterial Culture 09/27/2024 No growth   Final   Office Visit on 09/25/2024   Component Date Value Ref Range Status    Aerobic Bacterial Culture 09/25/2024 Final report   Final    Result 1 09/25/2024 Mixed skin ariella   Final         Current Outpatient Medications on File Prior to Visit   Medication Sig Dispense Refill    aspirin (ECOTRIN) 81 MG EC tablet Take 81 mg by mouth once daily.      cyanocobalamin (VITAMIN B-12)  "100 MCG tablet Take by mouth.      fish oil-omega-3 fatty acids 300-1,000 mg capsule Take by mouth 2 (two) times daily.      JANUVIA 50 mg Tab       JARDIANCE 10 mg tablet       lisinopriL (PRINIVIL,ZESTRIL) 40 MG tablet       metFORMIN (GLUCOPHAGE) 1000 MG tablet Take 1,000 mg by mouth 2 (two) times daily.      multivitamin (THERAGRAN) per tablet Take 1 tablet by mouth once daily.      pioglitazone (ACTOS) 30 MG tablet Take 30 mg by mouth once daily.      rosuvastatin (CRESTOR) 20 MG tablet Take 20 mg by mouth once daily.      [DISCONTINUED] gabapentin (NEURONTIN) 600 MG tablet Take 600 mg by mouth 3 (three) times daily.      [DISCONTINUED] glimepiride (AMARYL) 4 MG tablet Take 4 mg by mouth before breakfast.      [DISCONTINUED] JANUVIA 25 mg Tab Take 25 mg by mouth.      [DISCONTINUED] liraglutide 0.6 mg/0.1 mL, 18 mg/3 mL, subq PNIJ (VICTOZA 2-ALBAN) 0.6 mg/0.1 mL (18 mg/3 mL) PnIj pen Inject 2.4 mg into the skin.      [DISCONTINUED] lisinopriL (PRINIVIL,ZESTRIL) 20 MG tablet Take 20 mg by mouth once daily.      [DISCONTINUED] norethindrone-ethinyl estradiol (MICROGESTIN 1/20) 1-20 mg-mcg per tablet Take 1 tablet by mouth once daily.      [DISCONTINUED] NYSTOP powder Apply topically.      [DISCONTINUED] omega-3 fatty acids 1,000 mg Cap Take 1 capsule by mouth 2 (two) times daily.      [DISCONTINUED] pen needle, diabetic 31 gauge x 1/4" Ndle as per administration instructions      [DISCONTINUED] sulfamethoxazole-trimethoprim 800-160mg (BACTRIM DS) 800-160 mg Tab Take 1 tablet by mouth 2 (two) times daily. (Patient not taking: Reported on 11/12/2024)       No current facility-administered medications on file prior to visit.     Past Medical History:   Diagnosis Date    Diabetes mellitus     Hypertension     Morbid (severe) obesity due to excess calories     uterine cancer 2019     Past Surgical History:   Procedure Laterality Date    HYSTERECTOMY  2019     Past Surgical History:   Procedure Laterality Date    " HYSTERECTOMY  2019     Family History   Problem Relation Name Age of Onset    Mental illness Mother Kaylynn Torres     Miscarriages / Stillbirths Mother Kaylynn Torres     Alcohol abuse Father Trevor Torres     Arthritis Father Trevor Torres     Vision loss Father Trevor Torres        Marital Status:   Alcohol History:  reports that she does not currently use alcohol.  Tobacco History:  reports that she quit smoking about 8 years ago. Her smoking use included cigarettes. She has a 15 pack-year smoking history. She has been exposed to tobacco smoke. She has never used smokeless tobacco.  Drug History:  reports current drug use. Drug: Marijuana.    Health Maintenance Topics with due status: Not Due       Topic Last Completion Date    TETANUS VACCINE 09/08/2019    Lipid Panel 01/25/2024    Mammogram 07/10/2024    Shingles Vaccine 10/08/2024    Hemoglobin A1c 11/11/2024    Low Dose Statin 11/12/2024    RSV Vaccine (Age 60+ and Pregnant patients) Not Due     Immunization History   Administered Date(s) Administered    COVID-19, MRNA, LN-S, PF (Pfizer) (Purple Cap) 04/07/2021, 04/28/2021, 12/27/2021       Review of patient's allergies indicates:  No Known Allergies    Current Outpatient Medications:     aspirin (ECOTRIN) 81 MG EC tablet, Take 81 mg by mouth once daily., Disp: , Rfl:     cyanocobalamin (VITAMIN B-12) 100 MCG tablet, Take by mouth., Disp: , Rfl:     fish oil-omega-3 fatty acids 300-1,000 mg capsule, Take by mouth 2 (two) times daily., Disp: , Rfl:     JANUVIA 50 mg Tab, , Disp: , Rfl:     JARDIANCE 10 mg tablet, , Disp: , Rfl:     lisinopriL (PRINIVIL,ZESTRIL) 40 MG tablet, , Disp: , Rfl:     metFORMIN (GLUCOPHAGE) 1000 MG tablet, Take 1,000 mg by mouth 2 (two) times daily., Disp: , Rfl:     multivitamin (THERAGRAN) per tablet, Take 1 tablet by mouth once daily., Disp: , Rfl:     pioglitazone (ACTOS) 30 MG tablet, Take 30 mg by mouth once daily., Disp: , Rfl:     rosuvastatin (CRESTOR) 20 MG tablet, Take  "20 mg by mouth once daily., Disp: , Rfl:     gabapentin (NEURONTIN) 400 MG capsule, Take 2 capsules (800 mg total) by mouth every evening., Disp: 60 capsule, Rfl: 11    Review of Systems   Constitutional:  Negative for chills and fever.   Respiratory:  Negative for shortness of breath.    Cardiovascular:  Negative for chest pain.   Gastrointestinal:  Negative for nausea and vomiting.   Endocrine: Positive for polyuria.   Genitourinary:  Negative for menstrual problem and vaginal bleeding.   Musculoskeletal:  Positive for leg pain.       OBJECTIVE:      Vitals:    11/12/24 1316   BP: 124/74   BP Location: Right arm   Patient Position: Sitting   Pulse: 71   Resp: 18   SpO2: 97%   Weight: 122.4 kg (269 lb 13.5 oz)   Height: 5' 6" (1.676 m)     Physical Exam  Constitutional:       General: She is not in acute distress.     Appearance: She is obese. She is not ill-appearing or toxic-appearing.   HENT:      Head: Normocephalic and atraumatic.      Mouth/Throat:      Mouth: Mucous membranes are moist.      Pharynx: Uvula midline. No pharyngeal swelling.   Cardiovascular:      Rate and Rhythm: Normal rate and regular rhythm.   Pulmonary:      Effort: Pulmonary effort is normal. No tachypnea, bradypnea, accessory muscle usage, prolonged expiration or respiratory distress.      Breath sounds: Normal breath sounds. No stridor. No wheezing, rhonchi or rales.   Abdominal:      General: Bowel sounds are normal. There is no distension.      Palpations: Abdomen is soft.      Tenderness: There is no abdominal tenderness. There is no guarding or rebound.   Neurological:      General: No focal deficit present.      Mental Status: She is alert.   Psychiatric:         Mood and Affect: Mood normal.         Behavior: Behavior normal.        Assessment:       1. Routine general medical examination at a health care facility    2. Type 2 diabetes mellitus with hyperglycemia, without long-term current use of insulin    3. BMI 40.0-44.9, adult "    4. Diabetic polyneuropathy associated with type 2 diabetes mellitus    5. High serum vitamin B12        Plan:       Routine general medical examination at a health care facility    Type 2 diabetes mellitus with hyperglycemia, without long-term current use of insulin  Noted A1C elevated to 8.4 from previous 6.7, likely due to weight regain and dietary non-adherence.  Provided information on the relationship between diabetes control and neuropathy improvement.  Continued Actos, Metformin, Januvia, and Jardiance for diabetes management.    BMI 40.0-44.9, adult  Awaiting bariatric surgery next January     Diabetic polyneuropathy associated with type 2 diabetes mellitus  -     gabapentin (NEURONTIN) 400 MG capsule; Take 2 capsules (800 mg total) by mouth every evening.  Dispense: 60 capsule; Refill: 11  Increased gabapentin to a higher dose for nighttime use; patient to report any excessive drowsiness -- in this case, then we need to stop the medication     High serum vitamin B12  Currently take it QOD  Advised the patient to take it 2x/week     FOLLOW UP:  Follow up with physician assistant in March for repeat labs and diabetes management post-surgery.  Follow up in about 8 months for appointment with me.  Follow up in 2-4 weeks prior to bariatric surgery (around December) for pre-operative evaluation.       Counseled on age and gender appropriate medical preventative services, including cancer screenings, immunizations, overall nutritional health, need for a consistent exercise regimen and an overall push towards maintaining a vigorous and active lifestyle.        Chelsie Temple M.D.  11/12/2024    This note was created using [facility] voice recognition software that occasionally misinterprets phrases or words

## 2024-11-13 ENCOUNTER — TELEPHONE (OUTPATIENT)
Dept: CARDIOLOGY | Facility: HOSPITAL | Age: 54
End: 2024-11-13

## 2024-11-13 DIAGNOSIS — E11.9 TYPE 2 DIABETES MELLITUS WITHOUT COMPLICATION: ICD-10-CM

## 2024-11-14 ENCOUNTER — CLINICAL SUPPORT (OUTPATIENT)
Dept: CARDIOLOGY | Facility: HOSPITAL | Age: 54
End: 2024-11-14
Attending: SURGERY
Payer: COMMERCIAL

## 2024-11-14 DIAGNOSIS — I10 HYPERTENSION, UNSPECIFIED TYPE: ICD-10-CM

## 2024-11-14 DIAGNOSIS — G47.30 SLEEP APNEA, UNSPECIFIED TYPE: ICD-10-CM

## 2024-11-14 DIAGNOSIS — K21.9 GASTROESOPHAGEAL REFLUX DISEASE, UNSPECIFIED WHETHER ESOPHAGITIS PRESENT: ICD-10-CM

## 2024-11-14 DIAGNOSIS — M19.90 OSTEOARTHRITIS, UNSPECIFIED OSTEOARTHRITIS TYPE, UNSPECIFIED SITE: ICD-10-CM

## 2024-11-14 DIAGNOSIS — E78.5 HYPERLIPIDEMIA, UNSPECIFIED HYPERLIPIDEMIA TYPE: ICD-10-CM

## 2024-11-14 DIAGNOSIS — E11.9 TYPE 2 DIABETES MELLITUS WITHOUT COMPLICATION, WITH LONG-TERM CURRENT USE OF INSULIN: ICD-10-CM

## 2024-11-14 DIAGNOSIS — E66.01 MORBID OBESITY: ICD-10-CM

## 2024-11-14 DIAGNOSIS — Z79.4 TYPE 2 DIABETES MELLITUS WITHOUT COMPLICATION, WITH LONG-TERM CURRENT USE OF INSULIN: ICD-10-CM

## 2024-11-14 LAB
CV STRESS BASE HR: 93 BPM
DIASTOLIC BLOOD PRESSURE: 74 MMHG
OHS CV CPX 1 MINUTE RECOVERY HEART RATE: 145 BPM
OHS CV CPX 85 PERCENT MAX PREDICTED HEART RATE MALE: 144
OHS CV CPX MAX PREDICTED HEART RATE: 169
OHS CV CPX PATIENT IS FEMALE: 1
OHS CV CPX PATIENT IS MALE: 0
OHS CV CPX PEAK DIASTOLIC BLOOD PRESSURE: 96 MMHG
OHS CV CPX PEAK HEAR RATE: 146 BPM
OHS CV CPX PEAK RATE PRESSURE PRODUCT: NORMAL
OHS CV CPX PEAK SYSTOLIC BLOOD PRESSURE: 245 MMHG
OHS CV CPX PERCENT MAX PREDICTED HEART RATE ACHIEVED: 91
OHS CV CPX RATE PRESSURE PRODUCT PRESENTING: 9114
OHS CV INITIAL DOSE: 20 MCG/KG/MIN
OHS CV PEAK DOSE: 40 MCG/KG/MIN
SYSTOLIC BLOOD PRESSURE: 98 MMHG
VIT B1 BLD-MCNC: 100 UG/L (ref 38–122)

## 2024-11-14 PROCEDURE — 63600175 PHARM REV CODE 636 W HCPCS: Performed by: SURGERY

## 2024-11-14 PROCEDURE — 63600150 PHARM REV CODE 636: Performed by: SURGERY

## 2024-11-14 PROCEDURE — 93351 STRESS TTE COMPLETE: CPT | Mod: 26,,, | Performed by: GENERAL PRACTICE

## 2024-11-14 PROCEDURE — 93351 STRESS TTE COMPLETE: CPT

## 2024-11-14 RX ORDER — ATROPINE SULFATE 0.1 MG/ML
0.6 INJECTION INTRAVENOUS ONCE
Status: COMPLETED | OUTPATIENT
Start: 2024-11-14 | End: 2024-11-14

## 2024-11-14 RX ORDER — DOBUTAMINE HYDROCHLORIDE 100 MG/100ML
20 INJECTION INTRAVENOUS CONTINUOUS
Status: DISCONTINUED | OUTPATIENT
Start: 2024-11-14 | End: 2024-11-15 | Stop reason: HOSPADM

## 2024-11-14 RX ADMIN — ATROPINE SULFATE 0.6 MG: 0.1 INJECTION INTRAVENOUS at 09:11

## 2024-11-14 RX ADMIN — DOBUTAMINE HYDROCHLORIDE 20 MCG/KG/MIN: 100 INJECTION INTRAVENOUS at 09:11

## 2024-11-14 NOTE — NURSING NOTE
Dobutamine Stress Echo Test completed without complications. Patient verbalized understanding of pre-post test instructions. Discharged from lab per cardiology.

## 2024-11-18 ENCOUNTER — DOCUMENTATION ONLY (OUTPATIENT)
Dept: INTERNAL MEDICINE | Facility: CLINIC | Age: 54
End: 2024-11-18
Payer: COMMERCIAL

## 2024-11-18 ENCOUNTER — PATIENT MESSAGE (OUTPATIENT)
Dept: BARIATRICS | Facility: CLINIC | Age: 54
End: 2024-11-18
Payer: COMMERCIAL

## 2024-11-18 DIAGNOSIS — E78.5 HYPERLIPIDEMIA, UNSPECIFIED HYPERLIPIDEMIA TYPE: ICD-10-CM

## 2024-11-18 DIAGNOSIS — I10 HYPERTENSION, UNSPECIFIED TYPE: Primary | ICD-10-CM

## 2024-11-18 NOTE — HPI
"Chart reviewed in preparation for bariatric surgery    Female age 53 y    BMI 43.55 as of 11/12/2024   DM2- A1c 8.8 on 5/30/24 (A1c lab scheduled 11/11) on metformin, actos and added jardiance on 10/10  HTN- above goal at PCP visit 10/10- increased lisinopril to 40 mg daily   HLD- crestor   Anemia- blood transfusion in the past related to cancer.   Endometrial cancer- hem/onc note 5/30/24- "Stage IB endometrioid adenocarcinoma with isolated tumor cells, 5 years since completion of adjuvant radiation" due for follow up around 11/30.   CHATO- couldn't afford machine   Varicose veins- L lower leg          Denies other known heart, lung, liver, kidney problems. Reports fatty liver that was seen on ultrasound years ago.      COVID: yes once but unsure when, no symptoms   Vaccinated: yes   "

## 2024-11-18 NOTE — PROGRESS NOTES
"[facility] [address]  Progress Note    Patient Name: Gayatri Dela Cruz  MRN: [id number]  Date of Evaluation- 11/18/2024  PCP- Chelsie Temple MD        HPI:  Chart reviewed in preparation for bariatric surgery    Female age 53 y    BMI 43.55 as of 11/12/2024   DM2- A1c 8.8 on 5/30/24 (A1c lab scheduled 11/11) on metformin, actos and added jardiance on 10/10  HTN- above goal at PCP visit 10/10- increased lisinopril to 40 mg daily   HLD- crestor   Anemia- blood transfusion in the past related to cancer.   Endometrial cancer- hem/onc note 5/30/24- "Stage IB endometrioid adenocarcinoma with isolated tumor cells, 5 years since completion of adjuvant radiation" due for follow up around 11/30.   CHATO- couldn't afford machine   Varicose veins- L lower leg          Denies other known heart, lung, liver, kidney problems. Reports fatty liver that was seen on ultrasound years ago.      COVID: yes once but unsure when, no symptoms   Vaccinated: yes   "

## 2024-11-20 ENCOUNTER — PATIENT MESSAGE (OUTPATIENT)
Dept: BARIATRICS | Facility: CLINIC | Age: 54
End: 2024-11-20
Payer: COMMERCIAL

## 2024-11-20 ENCOUNTER — PATIENT MESSAGE (OUTPATIENT)
Dept: ADMINISTRATIVE | Facility: HOSPITAL | Age: 54
End: 2024-11-20
Payer: COMMERCIAL

## 2024-11-21 ENCOUNTER — OFFICE VISIT (OUTPATIENT)
Dept: OBSTETRICS AND GYNECOLOGY | Facility: CLINIC | Age: 54
End: 2024-11-21
Payer: COMMERCIAL

## 2024-11-21 VITALS
OXYGEN SATURATION: 98 % | HEART RATE: 79 BPM | DIASTOLIC BLOOD PRESSURE: 74 MMHG | SYSTOLIC BLOOD PRESSURE: 118 MMHG | WEIGHT: 265 LBS | HEIGHT: 66 IN | BODY MASS INDEX: 42.59 KG/M2

## 2024-11-21 DIAGNOSIS — Z01.419 WELL WOMAN EXAM WITH ROUTINE GYNECOLOGICAL EXAM: Primary | ICD-10-CM

## 2024-11-21 DIAGNOSIS — Z85.42 HISTORY OF ENDOMETRIAL CANCER: ICD-10-CM

## 2024-11-21 PROCEDURE — 99999 PR PBB SHADOW E&M-EST. PATIENT-LVL III: CPT | Mod: PBBFAC,COE,, | Performed by: FAMILY MEDICINE

## 2024-11-21 NOTE — Clinical Note
In my opinion, from a gynecologic standpoint the patient is  is medically optimized for this procedure.

## 2024-11-21 NOTE — PROGRESS NOTES
HISTORY OF THE PRESENT ILLNESS    11/21/2024  Gayatri Dela Cruz is a 53 y.o. here for Annual Exam  Having bariatric surgery and requesting clearance today.   History of endometrial cancer and still sees oncologist for follow up, upcoming appointment on 5/29/25    JOSEPHINEsP's: No obstetric history on file.  LMP: Patient's last menstrual period was 06/01/2016.  Relationship:   Contraception:  post-hysterectomy    PAP'S: no h/o abnormals  PAP: PAP neg / Date: 2018    Last Mammogram: 7/10/2024 Results:   showed concern, had diagnostic and was benign    HRT: denies   BLEEDING: denies    Last colonoscopy: 2022     LABS & RADS   Lab Results   Component Value Date    WBC 7.2 01/25/2024    HGB 14.0 01/25/2024    HCT 41.6 01/25/2024     02/25/2019    MCV 85.2 01/25/2024      Lab Results   Component Value Date    TSH 1.77 08/23/2022         GYNECOLOGIC HISTORY  4/1/2019 RATLH, Bilateral salpingectomy, sentinel node biopsy  due to endometrial cancer. Still sees oncologist (Camila Vera)    OBSTETRIC HISTORY  OB History   No obstetric history on file.       PAST MEDICAL HISTORY  -------------------------------------    Diabetes mellitus    Hypertension    Morbid (severe) obesity due to excess calories    uterine cancer         PAST SURGICAL HISTORY  ----------------------------    Hysterectomy         ALLERGIES  Review of patient's allergies indicates:  No Known Allergies    MEDICATIONS  Current Outpatient Medications   Medication Instructions    aspirin (ECOTRIN) 81 mg, Daily    cyanocobalamin (VITAMIN B-12) 100 MCG tablet Take by mouth.    fish oil-omega-3 fatty acids 300-1,000 mg capsule 2 times daily    gabapentin (NEURONTIN) 800 mg, Oral, Nightly    JANUVIA 50 mg Tab     JARDIANCE 10 mg tablet     lisinopriL (PRINIVIL,ZESTRIL) 40 MG tablet     metFORMIN (GLUCOPHAGE) 1,000 mg, 2 times daily    multivitamin (THERAGRAN) per tablet 1 tablet, Daily    pioglitazone (ACTOS) 30 mg, Daily    rosuvastatin  "(CRESTOR) 20 mg, Daily       SOCIAL HISTORY  Social History     Tobacco Use    Smoking status: Former     Current packs/day: 0.00     Average packs/day: 1 pack/day for 15.0 years (15.0 ttl pk-yrs)     Types: Cigarettes     Quit date: 3/20/2016     Years since quittin.6     Passive exposure: Past    Smokeless tobacco: Never   Substance Use Topics    Alcohol use: Not Currently     Comment: social    Drug use: Yes     Types: Marijuana     Comment: rarely      Lives with:   Domestic Violence: no  Occupation:  [employer]       FAMILY HISTORY  BLEEDING or  CLOTTING DISORDERS: none  BREAST CA: MGM  UTERINE CA:  self  OVARIAN CA: none  COLON CA: none    REVIEW OF SYSTEMS  Review of Systems   Constitutional:  Negative for activity change, appetite change and fever.   Respiratory:  Negative for cough and shortness of breath.    Genitourinary:  Negative for dysuria, flank pain, frequency, pelvic pain, urgency and urinary incontinence.   Psychiatric/Behavioral:  Negative for depression.        --------------------------------------------------------------------------------------------------------------    PHYSICAL EXAM  VITALS:  Vitals:    24 1350   BP: 118/74   BP Location: Left arm   Patient Position: Sitting   Pulse: 79   SpO2: 98%   Weight: 120.2 kg (264 lb 15.9 oz)   Height: 5' 6" (1.676 m)     Exam conducted with a chaperone present.     Physical Exam:   Constitutional: She is oriented to person, place, and time. She appears well-developed and well-nourished.    HENT:   Head: Normocephalic and atraumatic.    Eyes: Pupils are equal, round, and reactive to light. Conjunctivae and EOM are normal.      Pulmonary/Chest: Effort normal.          Genitourinary:    Rectum normal.      Pelvic exam was performed with patient in the lithotomy position.   The external female genitalia was normal.   Genitalia hair distrobution normal .   Vagina was moist.Cervix is absent.Uterus is absent.             "   Neurological: She is alert and oriented to person, place, and time.    Skin: Skin is warm and dry.    Psychiatric: She has a normal mood and affect. Her behavior is normal.          ASSESSMENT AND PLAN:  Gayatri Dela Cruz 53 y.o.   Gayatri was seen today for annual exam.    Diagnoses and all orders for this visit:    Well woman exam with routine gynecological exam    History of endometrial cancer        Cervical Cancer screening: PAP testing no longer indicated; recommend periodic pelvic exams with visual inspection and palpation  HPV Vaccine: n/a (>45)    Mammogram: up to date  From a gynecologic standpoint the patient is currently doing well without complaints.     Patient was counseled today on :  Pap guidelines  Recommend periodic pelvic exams with visual inspection and palpation  mammograms starting annually at age 40  Encouraged self breast awareness; RTC for breast concerns  Colonoscopy after the age of 50  Dexa Bone Scan and calcium and vitamin D supplementation in menopause and to see her PCP for other health maintenance.     RTC for periodic GYN exam, sooner prn     In my opinion, from a gynecologic standpoint the patient is  is medically optimized for this procedure (bariatric).       Silvia Peck     Follow up if symptoms worsen or fail to improve.   Future Appointments   Date Time Provider Department Center   11/21/2024  2:30 PM Silvia Peck, NP [facility] OBGYN [facility]   1/8/2025  7:20 AM LAB, APPOINTMENT [facility]   1/8/2025  7:45 AM EKG, APPT [facility] EKG [facility]   1/8/2025  8:30 AM Gely Dawson MD [facility]   1/8/2025 10:00 AM Cielo Gallego [facility]   1/8/2025 11:00 AM Morena Robins MD [facility] PREOPC [facility]   1/8/2025  1:00 PM NURSE, BARIATRIC SURGERY [facility]   1/8/2025  2:00 PM Anali Kramer, PhD [facility] PSYCHOL [facility]   3/13/2025 11:00 AM Mindi Lane PA-C [facility]    7/14/2025  1:00 PM Cheslie Temple MD [facility] MED [place]         Tests to Keep You Healthy    Mammogram: Met on 7/10/2024  Eye Exam: DUE  Colon Cancer Screening: DUE  Last Blood Pressure <= 139/89 (11/12/2024): Yes  Last HbA1c < 8 (11/11/2024): NO

## 2024-11-22 ENCOUNTER — PATIENT OUTREACH (OUTPATIENT)
Dept: ADMINISTRATIVE | Facility: HOSPITAL | Age: 54
End: 2024-11-22
Payer: COMMERCIAL

## 2024-11-22 NOTE — PROGRESS NOTES
Population Health Chart Review & Patient Outreach Details      Additional [facility] Health Notes:    CAMPAIGN- Preventative Care Screening           Updates Requested / Reviewed:        Health Maintenance Topics Overdue:      VBHM Score: 2     Eye Exam  Foot Exam                       Health Maintenance Topic(s) Outreach Outcomes & Actions Taken:    Eye Exam - Outreach Outcomes & Actions Taken  : Pt Will Schedule with External Provider / Order Routed & Care Team Updated if Applicable and waiting on hem onc clearance.

## 2024-11-26 ENCOUNTER — TELEPHONE (OUTPATIENT)
Dept: BARIATRICS | Facility: CLINIC | Age: 54
End: 2024-11-26
Payer: COMMERCIAL

## 2024-12-16 ENCOUNTER — OFFICE VISIT (OUTPATIENT)
Dept: URGENT CARE | Facility: CLINIC | Age: 54
End: 2024-12-16
Payer: COMMERCIAL

## 2024-12-16 VITALS
WEIGHT: 264 LBS | HEART RATE: 78 BPM | BODY MASS INDEX: 42.43 KG/M2 | HEIGHT: 66 IN | DIASTOLIC BLOOD PRESSURE: 83 MMHG | OXYGEN SATURATION: 96 % | SYSTOLIC BLOOD PRESSURE: 140 MMHG | RESPIRATION RATE: 15 BRPM | TEMPERATURE: 98 F

## 2024-12-16 DIAGNOSIS — U07.1 COVID-19 VIRUS DETECTED: ICD-10-CM

## 2024-12-16 DIAGNOSIS — R09.81 NASAL CONGESTION: Primary | ICD-10-CM

## 2024-12-16 DIAGNOSIS — R05.9 COUGH, UNSPECIFIED TYPE: ICD-10-CM

## 2024-12-16 LAB
CTP QC/QA: YES
CTP QC/QA: YES
FLUAV AG NPH QL: NEGATIVE
FLUBV AG NPH QL: NEGATIVE
SARS-COV-2 AG RESP QL IA.RAPID: POSITIVE

## 2024-12-16 NOTE — PATIENT INSTRUCTIONS
- Rest.    - Drink plenty of fluids.       - You can take over-the-counter claritin, zyrtec, allegra, or xyzal as directed. These are antihistamines that can help with runny nose, nasal congestion, sneezing, and helps to dry up post-nasal drip, which usually causes sore throat and cough.    - You can take plain Mucinex (guaifenesin) 1200 mg twice a day to help loosen mucous.     - If you do NOT have high blood pressure, you may use a decongestant form (D)  of this medication (ie. Claritin- D, zyrtec-D, allegra-D) or if you do not take the D form, you can take sudafed (pseudoephedrine) over the counter, which is a decongestant. Do NOT take two decongestant (D) medications at the same time (such as mucinex-D and claritin-D or plain sudafed and claritin D). Dextromethorphan (DM) is a cough suppressant over the counter (ie. mucinex DM, robitussin, delsym; dayquil/nyquil has DM as well.)     - You can use Flonase (fluticasone) nasal spray as directed for sinus congestion and postnasal drip. This is a steroid nasal spray that works locally over time to decrease the inflammation in your nose/sinuses and help with allergic symptoms. This is not an quick- relief spray like afrin, but it works well if used daily.  Discontinue if you develop nose bleed  - Use nasal saline prior to Flonase.  - Use Ocean Spray Nasal Saline 1-3 puffs each nostril every 2-3 hours then blow out onto tissue. This is to irrigate the nasal passage way to clear the sinus openings. Use until sinus problem resolved.    - A Neti Pot with sterile saline can help break up nasal congestion and give relief.      - Warm salt water gargles can help with sore throat     - Warm tea with honey can help with sore throat and cough. Honey is a natural cough suppressant.

## 2024-12-16 NOTE — PROGRESS NOTES
"Subjective:      Patient ID: Gayatri Dela Cruz is a 53 y.o. female.    Vitals:  height is 5' 6" (1.676 m) and weight is 119.7 kg (264 lb). Her oral temperature is 97.9 °F (36.6 °C). Her blood pressure is 140/83 (abnormal) and her pulse is 78. Her respiration is 15 and oxygen saturation is 96%.     Chief Complaint: URI    Patient presents with sore throat, nasal congestion and cough that started yesterday.  She denies fever and chills.  Patient further denies shortness of breath, nausea, vomiting and diarrhea.    URI   This is a new problem. The current episode started yesterday. The problem has been unchanged. There has been no fever. The cough is Non-productive. Associated symptoms include congestion and coughing. Treatments tried: Dayquil, Nyquil. The treatment provided no relief.       HENT:  Positive for congestion and postnasal drip.    Respiratory:  Positive for cough.       Objective:     Physical Exam   Constitutional: She is oriented to person, place, and time. She appears well-developed. She is cooperative.  Non-toxic appearance. She does not appear ill. No distress.   HENT:   Head: Normocephalic and atraumatic.   Ears:   Right Ear: Hearing and external ear normal.   Left Ear: Hearing and external ear normal.   Nose: Nose normal. No mucosal edema, rhinorrhea or nasal deformity. No epistaxis. Right sinus exhibits no maxillary sinus tenderness and no frontal sinus tenderness. Left sinus exhibits no maxillary sinus tenderness and no frontal sinus tenderness.   Mouth/Throat: Uvula is midline, oropharynx is clear and moist and mucous membranes are normal. No trismus in the jaw. Normal dentition. No uvula swelling. No oropharyngeal exudate, posterior oropharyngeal edema or posterior oropharyngeal erythema.   Eyes: Conjunctivae and lids are normal. No scleral icterus.   Neck: Trachea normal and phonation normal. Neck supple. No edema present. No erythema present. No neck rigidity present.   Cardiovascular: " Normal rate, regular rhythm, normal heart sounds and normal pulses.   Pulmonary/Chest: Effort normal and breath sounds normal. No respiratory distress. She has no decreased breath sounds. She has no rhonchi.   Abdominal: Normal appearance.   Musculoskeletal: Normal range of motion.         General: No deformity. Normal range of motion.   Neurological: She is alert and oriented to person, place, and time. She exhibits normal muscle tone. Coordination normal.   Skin: Skin is warm, dry, intact, not diaphoretic and not pale.   Psychiatric: Her speech is normal and behavior is normal. Judgment and thought content normal.   Nursing note and vitals reviewed.      Assessment:     1. Nasal congestion        Plan:       Nasal congestion  -     POCT Influenza A/B Rapid Antigen  -     SARS Coronavirus 2 Antigen, POCT Manual Read             Results for orders placed or performed in visit on 12/16/24   POCT Influenza A/B Rapid Antigen    Collection Time: 12/16/24 12:36 PM   Result Value Ref Range    Rapid Influenza A Ag Negative Negative    Rapid Influenza B Ag Negative Negative     Acceptable Yes    SARS Coronavirus 2 Antigen, POCT Manual Read    Collection Time: 12/16/24 12:36 PM   Result Value Ref Range    SARS Coronavirus 2 Antigen Positive (A) Negative     Acceptable Yes      - Rest.    - Drink plenty of fluids.    - You can take over-the-counter claritin, zyrtec, allegra, or xyzal as directed. These are antihistamines that can help with runny nose, nasal congestion, sneezing, and helps to dry up post-nasal drip, which usually causes sore throat and cough.    - You can take plain Mucinex (guaifenesin) 1200 mg twice a day to help loosen mucous.     - If you do NOT have high blood pressure, you may use a decongestant form (D)  of this medication (ie. Claritin- D, zyrtec-D, allegra-D) or if you do not take the D form, you can take sudafed (pseudoephedrine) over the counter, which is a  decongestant. Do NOT take two decongestant (D) medications at the same time (such as mucinex-D and claritin-D or plain sudafed and claritin D). Dextromethorphan (DM) is a cough suppressant over the counter (ie. mucinex DM, robitussin, delsym; dayquil/nyquil has DM as well.)     - You can use Flonase (fluticasone) nasal spray as directed for sinus congestion and postnasal drip. This is a steroid nasal spray that works locally over time to decrease the inflammation in your nose/sinuses and help with allergic symptoms. This is not an quick- relief spray like afrin, but it works well if used daily.  Discontinue if you develop nose bleed  - Use nasal saline prior to Flonase.  - Use Ocean Spray Nasal Saline 1-3 puffs each nostril every 2-3 hours then blow out onto tissue. This is to irrigate the nasal passage way to clear the sinus openings. Use until sinus problem resolved.    - A Neti Pot with sterile saline can help break up nasal congestion and give relief.      - Warm salt water gargles can help with sore throat     - Warm tea with honey can help with sore throat and cough. Honey is a natural cough suppressant.

## 2024-12-18 RX ORDER — DEXBROMPHENIRAMINE MALEATE, DEXTROMETHORPHAN HYDROBROMIDE AND PHENYLEPHRINE HYDROCHLORIDE 2; 15; 7.5 MG/5ML; MG/5ML; MG/5ML
5 LIQUID ORAL EVERY 4 HOURS PRN
Qty: 473 ML | Refills: 0 | Status: SHIPPED | OUTPATIENT
Start: 2024-12-18

## 2024-12-22 ENCOUNTER — OFFICE VISIT (OUTPATIENT)
Dept: URGENT CARE | Facility: CLINIC | Age: 54
End: 2024-12-22
Payer: COMMERCIAL

## 2024-12-22 VITALS
OXYGEN SATURATION: 96 % | DIASTOLIC BLOOD PRESSURE: 72 MMHG | HEART RATE: 64 BPM | RESPIRATION RATE: 18 BRPM | SYSTOLIC BLOOD PRESSURE: 116 MMHG | HEIGHT: 66 IN | WEIGHT: 264 LBS | BODY MASS INDEX: 42.43 KG/M2 | TEMPERATURE: 97 F

## 2024-12-22 DIAGNOSIS — Z02.6 ENCOUNTER RELATED TO WORKER'S COMPENSATION CLAIM: ICD-10-CM

## 2024-12-22 DIAGNOSIS — S82.61XA CLOSED AVULSION FRACTURE OF LATERAL MALLEOLUS OF RIGHT FIBULA, INITIAL ENCOUNTER: Primary | ICD-10-CM

## 2024-12-22 DIAGNOSIS — M25.579 ANKLE PAIN, UNSPECIFIED CHRONICITY, UNSPECIFIED LATERALITY: ICD-10-CM

## 2024-12-22 PROCEDURE — 99214 OFFICE O/P EST MOD 30 MIN: CPT | Mod: S$GLB,COE,,

## 2024-12-22 PROCEDURE — 73610 X-RAY EXAM OF ANKLE: CPT | Mod: RT,S$GLB,COE, | Performed by: RADIOLOGY

## 2024-12-22 NOTE — PATIENT INSTRUCTIONS
You were given a referral today, if you don not hear from someone within 3 business days please call the patient referral number at [phone] to schedule your referral appointment.   Discussed with pt ice, elevate and rest. Discussed OTC medication for pain    You must understand that you've received an Urgent Care treatment only and that you may be released before all your medical problems are known or treated. You, the patient, will arrange for follow up care as instructed.  Follow up with your PCP or specialty clinic as directed in the next 1-2 weeks if not improved or as needed.  You can call [phone] to schedule an appointment with the appropriate provider.  If your condition worsens we recommend that you receive another evaluation at the emergency room immediately or contact your primary medical clinics after hours call service to discuss your concerns.  Please return here or go to the Emergency Department for any concerns or worsening of condition.  Please if you smoke please consider quitting. [facility] Smoke cessation hotline number is [phone], available at this number is free counseling and medications to live a healthier life!         If you were prescribed a narcotic or controlled medication, do not drive or operate heavy equipment or machinery while taking these medications.

## 2024-12-22 NOTE — PROGRESS NOTES
Subjective:      Patient ID: Gayatri Dela Cruz is a 54 y.o. female.    Chief Complaint: Ankle Injury (Work Related)    WC; Initial date of injury 12/22/2024  Pt states she was walking in the parking lot going inside [employer] and tripped and fell on a rock, hurting her right ankle. And scuffed her left knee.     Ankle Injury   Incident onset: 4:54 this morning.       Constitution: Negative.   HENT: Negative.     Neck: neck negative.   Cardiovascular: Negative.    Eyes: Negative.    Respiratory: Negative.     Gastrointestinal: Negative.    Endocrine: negative.   Genitourinary: Negative.    Musculoskeletal:  Positive for trauma, joint pain and joint swelling.   Skin: Negative.    Allergic/Immunologic: Negative.    Neurological: Negative.    Hematologic/Lymphatic: Negative.    Psychiatric/Behavioral: Negative.       Objective:     Physical Exam  Vitals and nursing note reviewed.   Constitutional:       Appearance: Normal appearance.   HENT:      Head: Normocephalic and atraumatic.      Nose: Nose normal.   Eyes:      Extraocular Movements: Extraocular movements intact.      Conjunctiva/sclera: Conjunctivae normal.      Pupils: Pupils are equal, round, and reactive to light.   Cardiovascular:      Rate and Rhythm: Normal rate.      Pulses: Normal pulses.   Pulmonary:      Effort: Pulmonary effort is normal.   Musculoskeletal:         General: Swelling, tenderness and signs of injury present.      Cervical back: Normal range of motion and neck supple.   Skin:     General: Skin is warm.   Neurological:      General: No focal deficit present.      Mental Status: She is alert and oriented to person, place, and time. Mental status is at baseline.   Psychiatric:         Mood and Affect: Mood normal.         Behavior: Behavior normal.         Thought Content: Thought content normal.         Judgment: Judgment normal.        Assessment:      1. Closed avulsion fracture of lateral malleolus of right fibula, initial encounter     2. Ankle pain, unspecified chronicity, unspecified laterality    3. Encounter related to worker's compensation claim      Plan:                 No follow-ups on file.

## 2024-12-30 ENCOUNTER — TELEPHONE (OUTPATIENT)
Dept: URGENT CARE | Facility: CLINIC | Age: 54
End: 2024-12-30
Payer: COMMERCIAL

## 2024-12-30 NOTE — TELEPHONE ENCOUNTER
Patient called inquiring about her Referral to the Orthopedic, I informed the patient that the signed approved [id number] for Ortho has not been received as of today from her . The patient states that she spoke with the  this AM and was informed that we would be reaching out to her, but now since she has been informed by me that we have not received It, she states that she will be calling the  back. HUMPHREYG

## 2025-01-06 DIAGNOSIS — M25.571 RIGHT ANKLE PAIN, UNSPECIFIED CHRONICITY: Primary | ICD-10-CM

## 2025-01-08 ENCOUNTER — OFFICE VISIT (OUTPATIENT)
Dept: BARIATRICS | Facility: CLINIC | Age: 55
End: 2025-01-08
Payer: COMMERCIAL

## 2025-01-08 ENCOUNTER — OFFICE VISIT (OUTPATIENT)
Dept: INTERNAL MEDICINE | Facility: CLINIC | Age: 55
End: 2025-01-08
Payer: COMMERCIAL

## 2025-01-08 ENCOUNTER — TELEPHONE (OUTPATIENT)
Dept: INTERNAL MEDICINE | Facility: CLINIC | Age: 55
End: 2025-01-08
Payer: COMMERCIAL

## 2025-01-08 ENCOUNTER — HOSPITAL ENCOUNTER (OUTPATIENT)
Dept: CARDIOLOGY | Facility: CLINIC | Age: 55
Discharge: HOME | End: 2025-01-08
Payer: COMMERCIAL

## 2025-01-08 ENCOUNTER — OFFICE VISIT (OUTPATIENT)
Dept: PSYCHIATRY | Facility: CLINIC | Age: 55
End: 2025-01-08
Payer: COMMERCIAL

## 2025-01-08 ENCOUNTER — CLINICAL SUPPORT (OUTPATIENT)
Dept: BARIATRICS | Facility: CLINIC | Age: 55
End: 2025-01-08
Payer: COMMERCIAL

## 2025-01-08 ENCOUNTER — LAB VISIT (OUTPATIENT)
Dept: LAB | Facility: HOSPITAL | Age: 55
End: 2025-01-08
Attending: SURGERY
Payer: COMMERCIAL

## 2025-01-08 ENCOUNTER — DOCUMENTATION ONLY (OUTPATIENT)
Dept: BARIATRICS | Facility: CLINIC | Age: 55
End: 2025-01-08

## 2025-01-08 VITALS
DIASTOLIC BLOOD PRESSURE: 58 MMHG | SYSTOLIC BLOOD PRESSURE: 108 MMHG | OXYGEN SATURATION: 97 % | WEIGHT: 270.31 LBS | HEIGHT: 67 IN | BODY MASS INDEX: 42.43 KG/M2 | HEART RATE: 79 BPM

## 2025-01-08 DIAGNOSIS — E11.9 TYPE 2 DIABETES MELLITUS WITHOUT COMPLICATION, WITH LONG-TERM CURRENT USE OF INSULIN: ICD-10-CM

## 2025-01-08 DIAGNOSIS — E66.813 CLASS 3 SEVERE OBESITY DUE TO EXCESS CALORIES WITH SERIOUS COMORBIDITY AND BODY MASS INDEX (BMI) OF 40.0 TO 44.9 IN ADULT: Primary | ICD-10-CM

## 2025-01-08 DIAGNOSIS — E66.01 MORBID OBESITY: ICD-10-CM

## 2025-01-08 DIAGNOSIS — Z79.4 TYPE 2 DIABETES MELLITUS WITHOUT COMPLICATION, WITH LONG-TERM CURRENT USE OF INSULIN: ICD-10-CM

## 2025-01-08 DIAGNOSIS — I10 HYPERTENSION, UNSPECIFIED TYPE: ICD-10-CM

## 2025-01-08 DIAGNOSIS — Z71.3 DIETARY COUNSELING AND SURVEILLANCE: Primary | ICD-10-CM

## 2025-01-08 DIAGNOSIS — E11.65 TYPE 2 DIABETES MELLITUS WITH HYPERGLYCEMIA, WITHOUT LONG-TERM CURRENT USE OF INSULIN: ICD-10-CM

## 2025-01-08 DIAGNOSIS — E78.5 HYPERLIPIDEMIA, UNSPECIFIED HYPERLIPIDEMIA TYPE: ICD-10-CM

## 2025-01-08 DIAGNOSIS — K44.9 HIATAL HERNIA WITH GERD WITHOUT ESOPHAGITIS: ICD-10-CM

## 2025-01-08 DIAGNOSIS — G47.30 SLEEP APNEA, UNSPECIFIED TYPE: ICD-10-CM

## 2025-01-08 DIAGNOSIS — E78.2 MIXED HYPERLIPIDEMIA: ICD-10-CM

## 2025-01-08 DIAGNOSIS — M19.90 OSTEOARTHRITIS, UNSPECIFIED OSTEOARTHRITIS TYPE, UNSPECIFIED SITE: ICD-10-CM

## 2025-01-08 DIAGNOSIS — R60.9 EDEMA, UNSPECIFIED TYPE: ICD-10-CM

## 2025-01-08 DIAGNOSIS — I83.92 ASYMPTOMATIC VARICOSE VEINS OF LEFT LOWER EXTREMITY: ICD-10-CM

## 2025-01-08 DIAGNOSIS — G56.01 CARPAL TUNNEL SYNDROME OF RIGHT WRIST: Primary | ICD-10-CM

## 2025-01-08 DIAGNOSIS — Z82.49 FAMILY HISTORY OF PULMONARY EMBOLISM: ICD-10-CM

## 2025-01-08 DIAGNOSIS — K21.9 HIATAL HERNIA WITH GERD WITHOUT ESOPHAGITIS: ICD-10-CM

## 2025-01-08 DIAGNOSIS — K21.9 GASTROESOPHAGEAL REFLUX DISEASE, UNSPECIFIED WHETHER ESOPHAGITIS PRESENT: ICD-10-CM

## 2025-01-08 DIAGNOSIS — Z86.16 HISTORY OF COVID-19: ICD-10-CM

## 2025-01-08 DIAGNOSIS — I10 ESSENTIAL HYPERTENSION: ICD-10-CM

## 2025-01-08 DIAGNOSIS — Z71.89 ENCOUNTER FOR PSYCHOLOGICAL ASSESSMENT PRIOR TO BARIATRIC SURGERY: Primary | ICD-10-CM

## 2025-01-08 DIAGNOSIS — T14.8XXA FRACTURE: ICD-10-CM

## 2025-01-08 DIAGNOSIS — E66.01 CLASS 3 SEVERE OBESITY DUE TO EXCESS CALORIES WITH SERIOUS COMORBIDITY AND BODY MASS INDEX (BMI) OF 40.0 TO 44.9 IN ADULT: Primary | ICD-10-CM

## 2025-01-08 DIAGNOSIS — E11.42 DIABETIC POLYNEUROPATHY ASSOCIATED WITH TYPE 2 DIABETES MELLITUS: ICD-10-CM

## 2025-01-08 DIAGNOSIS — Z85.42 HISTORY OF UTERINE CANCER: ICD-10-CM

## 2025-01-08 DIAGNOSIS — K76.0 FATTY LIVER: ICD-10-CM

## 2025-01-08 PROBLEM — L02.213 CUTANEOUS ABSCESS OF CHEST WALL: Status: ACTIVE | Noted: 2024-10-10

## 2025-01-08 PROBLEM — I83.90 VARICOSE VEIN OF LEG: Status: ACTIVE | Noted: 2025-01-08

## 2025-01-08 PROBLEM — C54.1 ENDOMETRIAL CANCER: Chronic | Status: RESOLVED | Noted: 2019-03-19 | Resolved: 2025-01-08

## 2025-01-08 PROBLEM — N83.209 SIMPLE OVARIAN CYST: Status: RESOLVED | Noted: 2019-03-19 | Resolved: 2025-01-08

## 2025-01-08 PROBLEM — R68.82 DECREASED LIBIDO WITHOUT SEXUAL DYSFUNCTION: Status: ACTIVE | Noted: 2019-05-21

## 2025-01-08 PROBLEM — B35.1 ONYCHOMYCOSIS: Status: RESOLVED | Noted: 2024-06-24 | Resolved: 2025-01-08

## 2025-01-08 PROBLEM — R91.8 LUNG NODULES: Status: ACTIVE | Noted: 2020-03-02

## 2025-01-08 PROBLEM — L02.213 CUTANEOUS ABSCESS OF CHEST WALL: Status: RESOLVED | Noted: 2024-10-10 | Resolved: 2025-01-08

## 2025-01-08 PROBLEM — B35.1 ONYCHOMYCOSIS: Status: ACTIVE | Noted: 2024-06-24

## 2025-01-08 LAB
25(OH)D3+25(OH)D2 SERPL-MCNC: 49 NG/ML (ref 30–96)
ALBUMIN SERPL BCP-MCNC: 4.1 G/DL (ref 3.5–5.2)
ALP SERPL-CCNC: 90 U/L (ref 40–150)
ALT SERPL W/O P-5'-P-CCNC: 28 U/L (ref 10–44)
ANION GAP SERPL CALC-SCNC: 10 MMOL/L (ref 8–16)
AST SERPL-CCNC: 21 U/L (ref 10–40)
BASOPHILS # BLD AUTO: 0.05 K/UL (ref 0–0.2)
BASOPHILS NFR BLD: 0.7 % (ref 0–1.9)
BILIRUB DIRECT SERPL-MCNC: 0.2 MG/DL (ref 0.1–0.3)
BILIRUB SERPL-MCNC: 0.6 MG/DL (ref 0.1–1)
BUN SERPL-MCNC: 17 MG/DL (ref 6–20)
CALCIUM SERPL-MCNC: 9.5 MG/DL (ref 8.7–10.5)
CHLORIDE SERPL-SCNC: 106 MMOL/L (ref 95–110)
CHOLEST SERPL-MCNC: 125 MG/DL (ref 120–199)
CHOLEST/HDLC SERPL: 3.6 {RATIO} (ref 2–5)
CO2 SERPL-SCNC: 25 MMOL/L (ref 23–29)
CREAT SERPL-MCNC: 0.8 MG/DL (ref 0.5–1.4)
DIFFERENTIAL METHOD BLD: NORMAL
EOSINOPHIL # BLD AUTO: 0.1 K/UL (ref 0–0.5)
EOSINOPHIL NFR BLD: 1.9 % (ref 0–8)
ERYTHROCYTE [DISTWIDTH] IN BLOOD BY AUTOMATED COUNT: 14.2 % (ref 11.5–14.5)
EST. GFR  (NO RACE VARIABLE): >60 ML/MIN/1.73 M^2
ESTIMATED AVG GLUCOSE: 197 MG/DL (ref 68–131)
FOLATE SERPL-MCNC: 19.5 NG/ML (ref 4–24)
GLUCOSE SERPL-MCNC: 159 MG/DL (ref 70–110)
H PYLORI IGG SERPL QL IA: NEGATIVE
HBA1C MFR BLD: 8.5 % (ref 4–5.6)
HCT VFR BLD AUTO: 46.6 % (ref 37–48.5)
HDLC SERPL-MCNC: 35 MG/DL (ref 40–75)
HDLC SERPL: 28 % (ref 20–50)
HGB BLD-MCNC: 15.3 G/DL (ref 12–16)
IMM GRANULOCYTES # BLD AUTO: 0.01 K/UL (ref 0–0.04)
IMM GRANULOCYTES NFR BLD AUTO: 0.1 % (ref 0–0.5)
IRON SERPL-MCNC: 76 UG/DL (ref 30–160)
LDLC SERPL CALC-MCNC: 52.2 MG/DL (ref 63–159)
LYMPHOCYTES # BLD AUTO: 2 K/UL (ref 1–4.8)
LYMPHOCYTES NFR BLD: 28.5 % (ref 18–48)
MCH RBC QN AUTO: 28.3 PG (ref 27–31)
MCHC RBC AUTO-ENTMCNC: 32.8 G/DL (ref 32–36)
MCV RBC AUTO: 86 FL (ref 82–98)
MONOCYTES # BLD AUTO: 0.6 K/UL (ref 0.3–1)
MONOCYTES NFR BLD: 8.6 % (ref 4–15)
NEUTROPHILS # BLD AUTO: 4.1 K/UL (ref 1.8–7.7)
NEUTROPHILS NFR BLD: 60.2 % (ref 38–73)
NONHDLC SERPL-MCNC: 90 MG/DL
NRBC BLD-RTO: 0 /100 WBC
OHS QRS DURATION: 78 MS
OHS QTC CALCULATION: 420 MS
PLATELET # BLD AUTO: 231 K/UL (ref 150–450)
PMV BLD AUTO: 10.5 FL (ref 9.2–12.9)
POTASSIUM SERPL-SCNC: 4.1 MMOL/L (ref 3.5–5.1)
PROT SERPL-MCNC: 7.8 G/DL (ref 6–8.4)
RBC # BLD AUTO: 5.4 M/UL (ref 4–5.4)
SATURATED IRON: 18 % (ref 20–50)
SODIUM SERPL-SCNC: 141 MMOL/L (ref 136–145)
TOTAL IRON BINDING CAPACITY: 434 UG/DL (ref 250–450)
TRANSFERRIN SERPL-MCNC: 293 MG/DL (ref 200–375)
TRIGL SERPL-MCNC: 189 MG/DL (ref 30–150)
TSH SERPL DL<=0.005 MIU/L-ACNC: 1.16 UIU/ML (ref 0.4–4)
WBC # BLD AUTO: 6.84 K/UL (ref 3.9–12.7)

## 2025-01-08 PROCEDURE — 84443 ASSAY THYROID STIM HORMONE: CPT | Performed by: SURGERY

## 2025-01-08 PROCEDURE — 80048 BASIC METABOLIC PNL TOTAL CA: CPT | Performed by: SURGERY

## 2025-01-08 PROCEDURE — 3008F BODY MASS INDEX DOCD: CPT | Mod: CPTII,S$GLB,COE, | Performed by: SURGERY

## 2025-01-08 PROCEDURE — 4010F ACE/ARB THERAPY RXD/TAKEN: CPT | Mod: CPTII,S$GLB,COE, | Performed by: HOSPITALIST

## 2025-01-08 PROCEDURE — 3074F SYST BP LT 130 MM HG: CPT | Mod: CPTII,S$GLB,COE, | Performed by: SURGERY

## 2025-01-08 PROCEDURE — 99999 PR PBB SHADOW E&M-EST. PATIENT-LVL I: CPT | Mod: PBBFAC,COE,,

## 2025-01-08 PROCEDURE — 99999 PR PBB SHADOW E&M-EST. PATIENT-LVL III: CPT | Mod: PBBFAC,COE,, | Performed by: HOSPITALIST

## 2025-01-08 PROCEDURE — 1160F RVW MEDS BY RX/DR IN RCRD: CPT | Mod: CPTII,S$GLB,COE, | Performed by: HOSPITALIST

## 2025-01-08 PROCEDURE — 93010 ELECTROCARDIOGRAM REPORT: CPT | Mod: S$GLB,COE,, | Performed by: INTERNAL MEDICINE

## 2025-01-08 PROCEDURE — 99499 UNLISTED E&M SERVICE: CPT | Mod: S$GLB,COE,, | Performed by: DIETITIAN, REGISTERED

## 2025-01-08 PROCEDURE — 1159F MED LIST DOCD IN RCRD: CPT | Mod: CPTII,S$GLB,COE, | Performed by: SURGERY

## 2025-01-08 PROCEDURE — 83540 ASSAY OF IRON: CPT | Performed by: SURGERY

## 2025-01-08 PROCEDURE — 3078F DIAST BP <80 MM HG: CPT | Mod: CPTII,S$GLB,COE, | Performed by: SURGERY

## 2025-01-08 PROCEDURE — 99999 PR PBB SHADOW E&M-EST. PATIENT-LVL IV: CPT | Mod: PBBFAC,COE,, | Performed by: SURGERY

## 2025-01-08 PROCEDURE — 4010F ACE/ARB THERAPY RXD/TAKEN: CPT | Mod: CPTII,S$GLB,COE, | Performed by: SURGERY

## 2025-01-08 PROCEDURE — 1159F MED LIST DOCD IN RCRD: CPT | Mod: CPTII,S$GLB,COE, | Performed by: HOSPITALIST

## 2025-01-08 PROCEDURE — 99999 PR PBB SHADOW E&M-EST. PATIENT-LVL I: CPT | Mod: PBBFAC,COE,, | Performed by: DIETITIAN, REGISTERED

## 2025-01-08 PROCEDURE — 99204 OFFICE O/P NEW MOD 45 MIN: CPT | Mod: S$GLB,COE,, | Performed by: SURGERY

## 2025-01-08 PROCEDURE — 80061 LIPID PANEL: CPT | Performed by: SURGERY

## 2025-01-08 PROCEDURE — 83036 HEMOGLOBIN GLYCOSYLATED A1C: CPT | Performed by: SURGERY

## 2025-01-08 PROCEDURE — 85025 COMPLETE CBC W/AUTO DIFF WBC: CPT | Performed by: SURGERY

## 2025-01-08 PROCEDURE — 36415 COLL VENOUS BLD VENIPUNCTURE: CPT | Performed by: SURGERY

## 2025-01-08 PROCEDURE — 82306 VITAMIN D 25 HYDROXY: CPT | Performed by: SURGERY

## 2025-01-08 PROCEDURE — 3052F HG A1C>EQUAL 8.0%<EQUAL 9.0%: CPT | Mod: CPTII,S$GLB,COE, | Performed by: HOSPITALIST

## 2025-01-08 PROCEDURE — 1160F RVW MEDS BY RX/DR IN RCRD: CPT | Mod: CPTII,S$GLB,COE, | Performed by: SURGERY

## 2025-01-08 PROCEDURE — 82746 ASSAY OF FOLIC ACID SERUM: CPT | Performed by: SURGERY

## 2025-01-08 PROCEDURE — 93005 ELECTROCARDIOGRAM TRACING: CPT | Mod: S$GLB,COE,, | Performed by: SURGERY

## 2025-01-08 PROCEDURE — 86677 HELICOBACTER PYLORI ANTIBODY: CPT | Performed by: SURGERY

## 2025-01-08 PROCEDURE — 99215 OFFICE O/P EST HI 40 MIN: CPT | Mod: S$GLB,COE,, | Performed by: HOSPITALIST

## 2025-01-08 PROCEDURE — 80076 HEPATIC FUNCTION PANEL: CPT | Performed by: SURGERY

## 2025-01-08 RX ORDER — MUPIROCIN 20 MG/G
OINTMENT TOPICAL 3 TIMES DAILY
COMMUNITY
Start: 2024-09-25 | End: 2025-01-08

## 2025-01-08 RX ORDER — ACETAMINOPHEN 500 MG
1000 TABLET ORAL DAILY
COMMUNITY

## 2025-01-08 NOTE — PROGRESS NOTES
PRESURGICAL PSYCHOLOGICAL EVALUATION - BARIATRICS  Psychiatry Initial Visit (PhD/PsyD)   Psychological Intake and Assessment    Site: [facility], Department of Psychiatry, Psychology Section  [facility] [address] [address]    CPT Codes:   75562 (1 hour): Psychiatric Diagnostic Evaluation  11278 (1 hour), 43027 (1 hour): Integration of patient data, interpretation of standardized test results and clinical data, clinical decision-making, treatment planning and report, and interactive feedback to the patient  70056 (1 hour), Psychological or neuropsychological test administration, with single automated instrument via electronic platform, with automated result only    NAME: Gayarti Dela Cruz  MRN: [id number]  : 1970     Date: 2025  Evaluation Length (direct face-to-face time): 40 minutes  Total Time including report writing, test scoring, chart review, integration of data and feedback: 120 minutes    Clinical status of patient: Outpatient  Met with: Patient  Referred by: Gely Hernandez M.D.    Chief complaint/reason for encounter: Routine psychological evaluation prior to bariatric surgery.     Before this evaluation was initiated, the purposes and process of the assessment and the limits of confidentiality were discussed with the patient who expressed understanding of these issues and verbally consented to proceed with the evaluation.     Type of surgery sought: RNY Bypass      HISTORY OF PRESENT ILLNESS   Ms. Dela Cruz is a 54-year-old white female who is pursuing bariatric surgery to improve her health and quality of life. She has no history of significant psychological difficulties. She has never taken psychotropic medication, has never been hospitalized for psychiatric reasons, and denied any history of suicidality. She has begun making positive lifestyle changes in anticipation for surgery with good benefit. Ms. Dela Cruz has a Body Mass Index of 42.97 as documented  by the referring provider.    Ms. Dela Cruz has struggled with weight my whole life. Factors that have contributed to her weight gain over the years include laziness at some points, poor dietary choices, and consuming fast food. She denied a history of emotional eating, though she noted that she at times eats when she is bored. She has tried many weight loss methods on her own (i.e., low carb diets, Go-Low, low sugar diets, weight loss medications) with little success, and she believes that her biggest weight loss challenge is restrictions--I don't know when I think I'm full. I keep eating when I shouldn't. Her motivation for seeking surgery now is to improve her physical health and to become more active when spending time with her grandchildren. Her postsurgical goals include becoming more active, going places and dressing cute and looking good, and improving her self-confidence and self-esteem.     Ms. Dela Cruz has met once with Ms. Cielo Gallego RD, bariatric dietician. She also reported that she has previously met with bariatric dieticians through another health care system. She reported that she has made the following lifestyle changes since beginning the bariatric program: tracking her food intake and calorie consumption, reducing sugar intake, and increasing water intake. Ms. Dela Cruz has been cleared by Ms. Gallego to proceed with bariatric surgery.    Knowledge of Surgery Information:  Basics of procedure: I believe that they go in, and they reroute my small intestine, and I'm told that my stomach will be reduced. I think it's just rerouting.  Risks: Ms. Dela Cruz reported limited understanding of risks associated with the bariatric procedure. She was encouraged to discuss risks associated with the procedure in more detail with her bariatric team.   Basics of diet: Ms. Dela Cruz reported understanding of the liquid diet required before and following the bariatric procedure. She reported  understanding that she will transition from liquids to pureed foods to soft food following the procedure.     MEDICAL HISTORY  Co-morbidities: Ms. Dela Cruz reported experiencing medical conditions including diabetes, hypertension, arthritis, neuropathy, sleep apnea, GERD, and a hernia.     Patient Active Problem List    Diagnosis Date Noted    Fracture 01/08/2025    Sleep apnea 01/08/2025    Varicose vein of leg 01/08/2025    Edema 01/08/2025    Fatty liver 01/08/2025    Family history of pulmonary embolism 01/08/2025    Diabetic polyneuropathy associated with type 2 diabetes mellitus 11/12/2024    Carpal tunnel syndrome of right wrist 07/06/2023    Hiatal hernia with GERD without esophagitis 08/23/2022    Essential hypertension 04/20/2021    Lung nodules 03/02/2020    Decreased libido without sexual dysfunction 05/21/2019    Type 2 diabetes mellitus with hyperglycemia, without long-term current use of insulin 07/29/2016    Mixed hyperlipidemia 07/29/2016    BMI 40.0-44.9, adult 07/29/2016      Past Medical History:   Diagnosis Date    Arthritis 2021    COVID-19     Dyslipidemia     Encounter for blood transfusion 03/2019    Endometrial cancer 03/19/2019    Essential (primary) hypertension     Hiatal hernia 10/04/2022    Hyperlipidemia 2019    Morbid obesity with BMI of 45.0-49.9, adult     Simple ovarian cyst 03/19/2019    Type 2 diabetes mellitus with diabetic polyneuropathy      Pain: Denied     Current Health Behaviors:  Compliant with medical regimens and appointments: Yes  Prescription medication misuse: No  Exercise: Yes; Ms. Dela Cruz reported that she engages in exercise by walking regularly while working.   Adequate cognitive functioning: Yes    Current and Past Substance Use:   Alcohol: Denied current use; denied history of abuse or dependency.   Drugs: Ms. Dela Cruz denied current use of illicit substances. She reported a history of addiction to methamphetamine approximately 30 years ago.  Tobacco: Ms.  oJse De Jesus denied current tobacco use and noted that she has not used tobacco since 2013.  Caffeine: Ms. Dela Cruz reported that she consumes approximately 240 ounces of tea per week.     Current Medications and Drug Reactions (include OTC, herbal): see medication list       PAST AND CURRENT MENTAL HEALTH  Current Psychiatric Symptoms:   Depression: Denied depressed mood, loss of interest in pleasurable activities, anhedonia, sleep changes, decreased motivation, decreased concentration, feelings of excessive or irrational guilt, helplessness, hopelessness, increased or decreased motor activity, decreased energy, or suicidal ideation/thoughts of death.  Giuliana/Hypomania: Denied increased goal directed activity, decreased need for sleep, pressured speech or increased talkativeness, racing thoughts, increased risk-taking behavior, episodic elevated or irritable mood, flight of ideas, distractibility, inflated self-esteem, or grandiosity.  Anxiety: Denied excessive worry, difficulty controlling worrying, feeling keyed up, feeling easily fatigued, difficulty concentrating or mind going blank, irritability, muscle tension, sleep disturbance, racing thoughts, being unable to relax, or specific phobia.  Panic Attacks: Denied panic attacks characterized by heart palpitations, sweating, trembling, dyspnea, choking sensation, chest pain/discomfort, nausea, dizziness, chills or hot flashes, tingling, derealization, fear of losing control, or fear of dying.  Thoughts: Denied any AVH, paranoia, delusions, ideas of reference, thought insertion or thought broadcasting.  Suicidal thoughts/behaviors: Denied passive or active SI; denied suicidal plans or intent.  Self-injury: Denied.  Substance abuse: Denied abuse or dependence.   Sleep: Denied increased sleep latency, frequent nighttime awakenings, or early morning awakening with inability to return to sleep.  PTSD: Denied.     Current Psychiatric Treatment:  Medications:  Denied  Psychotherapy: Denied    Current Stress Management:   Current psychosocial stressors: Ms. Dela Cruz reported, I don't have too much stress right now. I stress about intimacy. I want to be more intimate, and I think this surgery will help with that.  Report of coping skills/recreational activities: Ms. Dela Cruz reported that she sally with stress by talking to others about her emotions and concerns. She enjoys spending time with her children and grandchildren and going to [employer].   Support system: Ms. Dela Cruz reported that she has a strong support system. She reported that her  serves as her primary source of support.     Past Psychiatric History:   Previous diagnosis: Denied.  Previous psychiatric hospitalizations/inpatient treatment: Denied.  History of outpatient treatment: Denied.  Previous suicide attempt: Denied.  Non-suicidal self-injury: Denied.    Trauma History: Denied.     History of Eating Disorders:  History of bulimia: Denied recurrent episodes of eating then engaging in inappropriate compensatory behaviors.   History of binge-eating episodes: Denied episodes of binge eating characterized by eating excessive amounts of food within a discrete time period with a lack of control during eating, eating more rapidly than normal, eating until uncomfortably full, eating large amounts of food when not physically hungry, eating alone due to embarrassment, or negative emotions (i.e., disgusted, guilty, depressed) after eating.     Family History of Psychiatric Illness: Ms. Dela Cruz reported that her mother experienced schizophrenia. She also reported that her father and uncle experienced alcohol abuse.     Psychosocial History and Current Social Situation:    Ms. Dela Cruz was born and raised in [place] by her biological mother and father until age 15. Ms. Dela Cruz noted that mother passed away when she was 15 years old. Following her mother's death, Ms. Dela Cruz was raised primarily by her aunt  with some assistance from her father. She described her childhood as great. She denied childhood trauma, abuse, and neglect. She did okay in school but noted that she discontinued high school due to multiple absences and lack of regular attendance. Ms. Dela Cruz obtained a GED at age 42. She denied being enrolled in special education or being held back. She denied significant detentions, suspensions, and expulsions, though she noted that at times she got into trouble for talking during class. 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 She denied  service. She is not on disability, and finances are stable. She has been  to her  since 2004. She has 3 stepchildren (ages 34, 32, and 31). She currently lives with her  and 32-year-old stepson. Spirituality is important to her as a source of support.  Legal history: Ms. Dela Cruz reported a previous arrest approximately 30 years ago. She denied current involvement in litigation.  Access to guns: Ms. Dela Cruz reported access to guns that are stored safely.     PSYCHOLOGICAL ASSESSMENT/TESTING:   All tests were administered according to standardized procedures and were selected based on the reason for referral. Effort on all tests was satisfactory to produce valid results.    MMPI-3. The MMPI-3 provides an assessment of personality and psychopathology with specific evaluation of psychosocial risk factors associated with outcomes of bariatric surgery. Ms. Dela Cruz produced a valid and interpretable MMPI-3 profile, answering items relevantly based on content. Therefore, her responses should be considered to be a relatively accurate reflection of her current psychological functioning. Her scores are all within the normal range and do not indicate current psychological symptoms or dysfunction.   TEST RESULTS. Ms. Dela Cruz reports no clinically significant somatic, cognitive, emotional, thought, behavioral, or interpersonal  dysfunction. She reported a better than average level of emotional adjustment and a high level of psychological well-being. Her scores suggest that she is optimistic, energetic, and experiences a wide range of positive experiences. Her scores also suggest that she experiences a below average level of anxiety. These findings align with Ms. Dela Cruz's reports that she has never experienced significant mental health concerns. These findings also align with her reports that she currently experiences minimal stress or distress and is a happy, positive and talkative person.    GROUP COMPARISONS. Compared to other bariatric surgery candidates, Ms. Dela Cruz reported higher levels of behavioral dysfunction, antisocial behavior, juvenile conduct problems, substance abuse, cynicism, and disconstraint. However, all of her scores for these concerns fell within a normal range. Compared to other bariatric surgery candidates, she reported lower levels of low positive emotions and anxiety-related experiences.     FEEDBACK. Ms. Dela Cruz was provided with test results and offered the opportunity to respond to feedback and clarify results if needed.    MENTAL STATUS EXAM:  General Appearance:  age appropriate, well dressed, neatly groomed    Speech:  normal tone, normal rate, normal pitch, normal volume    Level of Cooperation:  cooperative    Thought Processes:  normal and logical    Mood:  euthymic    Thought Content:  normal, no suicidality, no homicidality, delusions, or paranoia    Affect:  congruent and appropriate    Orientation:  oriented x3    Memory:  recent memory intact; remote memory intact; able to recall remote personal events   Attention Span & Concentration:  appropriate   Fund of General Knowledge:  appropriate for education    Abstract Reasoning:  Not directly assessed   Judgment & Insight:  fair    Language  intact        SUMMARY:  Ms. Dela Cruz is a 54-year-old female referred for presurgical psychological  evaluation prior to bariatric surgery.  There are no indications of disabling psychopathology, substance use/abuse, cognitive problems, or disabilities that would prevent understanding and competence with medical treatment. There are no reports of major psychosocial stressors that would interfere with her engagement in treatment. There is no evidence of suicidality.  She exhibits medium social stability and good social support. She has adequate coping strategies to deal with stress and the demands of surgery and recovery.  She has good knowledge about bariatric surgery, appropriate expectations for surgery and recovery, limited knowledge of possible risks of this treatment option, and a moderate willingness to sustain effort for lifestyle changes and health adaptations required. She reports adequate compliance with prior medical regimens.  Results from psychological assessment/testing revealed minimal risks.       IMPRESSIONS AND RECOMMENDATIONS:  Ms. Dela Cruz is a suitable candidate from a psychological perspective. There are no absolute psychological contraindications to bariatric surgery. Overall, Ms. Dela Cruz is at a LOW risk for adverse postsurgical outcomes.    Ms. Dela Cruz currently reports no psychiatric problems, major psychosocial stressors, or other problems that would contraindicate surgery or impact her ability to engage in treatment. She has adequate and appropriate knowledge and expectations, and she is motivated and willing to engage in behaviors to achieve successful outcomes with bariatric surgery. Ms. Dela Cruz could benefit from more deeply discussing the risks associated with the bariatric procedure with her bariatric team. She has multiple protective factors that should help her during surgery and recovery.     There are no recommendations for psychological intervention at this time.      Diagnostic impressions:    ICD-10-CM ICD-9-CM   1. Encounter for psychological assessment prior to bariatric  surgery  Z71.89 V65.49   2. BMI 40.0-44.9, adult  Z68.41 V85.41   3. Type 2 diabetes mellitus with hyperglycemia, without long-term current use of insulin  E11.65 250.00     790.29   4. Hiatal hernia with GERD without esophagitis  K44.9 553.3    K21.9 530.11   5. Essential hypertension  I10 401.9   6. Diabetic polyneuropathy associated with type 2 diabetes mellitus  E11.42 250.60     357.2   7. Sleep apnea, unspecified type  G47.30 780.57   8. Mixed hyperlipidemia  E78.2 272.2        Plan: This report will be sent to the referring provider with impressions and recommendations. It will be the referring team's decision whether the patient proceeds with surgery. Services related to the presurgical psychological evaluation are now concluded.      Anali Kramer, Ph.D.

## 2025-01-08 NOTE — LETTER
January 8, 2025        Gely Tatum MD  [address]  [facility] [address]             [facility] - Psych [address]  [address]  [place] [address]  Phone: [phone]   Patient: Gayatri Dela Cruz   MR Number: [id number]   YOB: 1970   Date of Visit: 1/8/2025       Dear Dr. aDvid Tatum:    Thank you for referring Gayatri Dela Cruz to me for evaluation. Below are the relevant portions of my assessment and plan of care.    Ms. Dela Cruz is a suitable candidate from a psychological perspective. There are no absolute psychological contraindications to bariatric surgery. Overall, Ms. Dela Cruz is at a LOW risk for adverse postsurgical outcomes.    Ms. Dela Cruz currently reports no psychiatric problems, major psychosocial stressors, or other problems that would contraindicate surgery or impact her ability to engage in treatment. She has adequate and appropriate knowledge and expectations, and she is motivated and willing to engage in behaviors to achieve successful outcomes with bariatric surgery. Ms. Dela Cruz could benefit from more deeply discussing the risks associated with the bariatric procedure with her bariatric team. She has multiple protective factors that should help her during surgery and recovery. There are no recommendations for psychological intervention at this time.    If you have questions, please do not hesitate to contact me.       Sincerely,    Anali Kramer, Ph.D.  Clinical Psychologist

## 2025-01-08 NOTE — ASSESSMENT & PLAN NOTE
She was found to have fatty liver based on an GI evaluation but she is doing good from a GI standpoint      No history  of cirrhosis of liver or suggestions of Liver  decompensation   No Jaundice , dark urine , pale stool   No easy bleeding or bruising    She has what sounds like a non alcohol-related fatty liver disease    She preferred to move on with the surgery without any further liver evaluation

## 2025-01-08 NOTE — PROGRESS NOTES
NUTRITIONAL CONSULT - [facility] – [place], [place]    Referring Physician: Gely Tatum M.D.   Reason for MNT Referral: Initial assessment for Tera-en-Y gastric bypass work-up    PAST MEDICAL HISTORY:   54 y.o. female    Weight history includes She has struggled with her weight her entire life. Her highest adult weight was 300 lbs at age 52, and her lowest adult weight was 180 lbs at age 27. The patient has tried low-carb diet and Go-Low . The patient was most successful with low-sugar diet with a weight loss of 50 lbs. States she has been cutting back on bread, chips, fast food, diet Coke since in work up for bariatric surgery and reviewing bariatric nutrition guidebook.    Past Medical History:   Diagnosis Date    Arthritis 2021    Dyslipidemia     Encounter for blood transfusion 03/2019    Endometrial cancer 03/19/2019    Essential (primary) hypertension     Hiatal hernia 10/04/2022    Hyperlipidemia 2019    Morbid obesity with BMI of 45.0-49.9, adult     Simple ovarian cyst 03/19/2019    Type 2 diabetes mellitus with diabetic polyneuropathy      CLINICAL DATA:  54 y.o.-year-old White female.  Height: 5 ft. 6 in.  Weight: 270 lbs  IBW: 144 lbs  BMI: 42.9  The patient's goal weight (50% EBW): 207 lbs (BMI 33)  Personal goal weight: 180 lbs    Goal for Bariatric Surgery: to improve health, to improve quality of life, to lose weight, and to prevent future medical conditions    DAILY NUTRITIONAL NEEDS: pre-op nutritional bariatric guidelines to promote weight loss  6749-6838 Calories    Grams Protein    NUTRITION & HEALTH HISTORY:  Greatest challenge: eating out/delivery frequency, sweets, starchy CHO, portion control, snacking at night, and ultra-processed foods    Current diet recall:     B: eggwich OR bagel   L: salad with spinach leaves, 3oz Los chicken chunks, strawberry balsamic dressing and croutons, glazed cranberries/walnuts  Sn: cucumbers with Italian dressing  D: chicken, sweet potato casserol with brown sugar  and butter, and grilled brussells     Past eating habits:  B: cereal OR D'lights eggwich OR egg and raisin toast with butter  L: sandwich with chips  - chips and red grapes  D: fast food burger and fries and diet coke  - ice cream    Current Diet:  Meal pattern: 3 meals + 1-2 snacks  Protein supplements: Premier shakes  Snacking: fruits, veggies  Vegetables: Likes a variety. Eats almost daily.  Fruits: Likes a variety. Eats 2-3 times per week.  Beverages: water and diet soda  Dining out/delivery: Reduced lately. Mostly fast food, restaurants, and take-out.  Cooking at home: Daily. Weekly. Mostly baked/roast, grilled/broiled, pan-fried/sauteed, and steamed beef, fish/seafood, pork, chicken/turkey, starchy CHO, vegetables, and beans    Exercise:  None lately.  Was walking the dog in the neighborhood  States she gets in steps while at work. 18,000 steps per day on work days.    Restrictions to Exercise: fractured ankle    Vitamins / Minerals / Herbs:   Fish oil  MV gummies    Labs:   Reviewed.    Food Allergies:   None known    Social:  [employer] [place]  Patient states she has a excellent support system. She lives with her  and their 32 year-old son.   Grocery shopping and food prep done by the pt's .  Patient believes the household will be supportive after surgery.  Alcohol: None.  Smoking: None.    ASSESSMENT:  Patient reports attempts at weight loss, only to regain lost weight.  Patient demonstrated knowledge of healthy eating behaviors and exercise patterns; admits to not eating healthy and not exercising at this point.  Patient demonstrates willingness to change lifestyle and make behavior modifications as evidenced by dietary changes, including protein drinks, increased vegetables, healthier cooking at home, and healthier snacking at home.    Barriers to Education: none    Stage of change: action    NUTRITION DIAGNOSIS:    Morbid Obesity related to Excessive carbohydrate intake, Excessive calorie intake,  and Physical inactivity as evidence by BMI.    BARIATRIC DIET DISCUSSION/PLAN:  Discussed diet after surgery and related to patient's food record.  Reviewed nutrition guidelines for before and after surgery.  Answered all questions.  Continue to review Bariatric Nutrition Guidebook at home and call with any questions.  Work on Bariatric Nutrition Checklist.  Work on expanding variety of vegetables.  Work on gradually cutting back on starchy CHO in the diet.  Begin trying various protein supplements to determine preference  Start including protein supplements in the diet plan daily.  5-6 meals per day  Increase exercise  Start shopping for bariatric vitamins & minerals    RECOMMENDATIONS:  Pt has been educated on the bariatric lifestyle  and is a good candidate for bariatric surgery.    Patient verbalized understanding.    Communicated nutrition plan with bariatric team.    SESSION TIME:  60 minutes

## 2025-01-08 NOTE — PROGRESS NOTES
Patient seen in clinic today for pre op visit with Dr. Tatum.  Added on HgbA1c per Dr. Tatum's request.  Result as 8.5.  Dr. Tatum stated patient was cleared for surgery. Amy Cobos RN notified.

## 2025-01-08 NOTE — ASSESSMENT & PLAN NOTE
She had COVID 2 times  The 1st was in 2023 and a 2nd was in December 2024    Did not require hospitalization, intubation or supplemental oxygen use   Had been vaccinated   Recovered from COVID    COVID screening     No fever   No cough   No SOB  No sore throat   No loss of taste or smell   No muscle aches   No nausea, vomiting , diarrhea

## 2025-01-08 NOTE — ASSESSMENT & PLAN NOTE
She is unable to pay for the CPAP machine    Sleep apnea     Informed the risk of worsening sleep apnea in the perioperative period    I suggest  caution with usage of medication that can cause respiratory suppression in the perioperative period    Avoidance of  supine sleep, weight gain , alcoholic beverages , care with , sedative , CNS depressant use indicated  since all of these can worsen CHATO         I suggest follow up and suggest caution with usage of medication that can cause respiratory suppression in the perioperative period  potential ramifications of untreated sleep apnea, which could include daytime sleepiness, hypertension, heart disease and stroke were discussed  Suggested not to drive, if feels sleepy

## 2025-01-08 NOTE — OUTPATIENT SUBJECTIVE & OBJECTIVE
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       Review of Systems   Constitutional:  Negative for chills and fever.        No unusual weight changes        HENT:          Sleep apnea     Eyes:         No new visual changes   Respiratory:          No cough , phlegm    No Hemoptysis   Cardiovascular:         As noted   Gastrointestinal:         No overt GI/ blood losses  Bowel movements- Regular   Endocrine:        Prednisone use > 20 mg daily for 3 weeks- none   Genitourinary:  Negative for dysuria.        No urinary hesitancy    Musculoskeletal:         As above      Skin:  Negative for rash.   Neurological:  Negative for syncope.        No unilateral weakness   Hematological:         Current use of Anticoagulants  None   Psychiatric/Behavioral:          No Depression,Anxiety  No eating disorders or purging       No past medical history pertinent negatives.        No anesthesia, bleeding, cardiac problems, PONV with previous surgeries/procedures.  Medications and Allergies reviewed in epic.     FH- No anesthesia,bleeding  ,  in family      Physical Exam    Nov 2024        Left Ventricle: The left ventricle is normal in size.    Stress Protocol: The patient was infused intravenously with dobutamine. The patient received a graduated infusion of the stress agent beginning at 20.0 mcg/kg/min to a peak dose of 40.0 mcg/kg/min. The peak heart rate was 146.0 bpm,  which is 91% of age predicted maximum heart rate. The patient was also given atropine for a total dose of 0.6 mg. The test was stopped because the end of the protocol was reached.    Baseline ECG: The Baseline ECG reveals sinus rhythm. The axis is normal. The ST segments are normal.Poor R wave progression , cant R/O old anterior MI    Stress ECG: There is no ST segment deviation identified during the protocol. There are no arrhythmias during stress. There is hypertensive blood pressure response with stress.    ECG Conclusion: The ECG portion of the study is negative for ischemia.    Post-stress Echo: The left ventricle systolic function is hyperdynamic.    Post-stress Conclusion: The study is normal and negative with no echocardiographic evidence of stress induced ischemia. This study shows a low prognostic risk.         Investigations  Lab and Imaging have been reviewed in epic.    Noted abnormal EKG but are history is not suggestive of coronary artery disease    INR is normal  Review of old records- Was done and information gathered regards to events leading to surgery and health conditions of significance in the perioperative period.    Outpatient Subjective & Objective         I offered a sheet and the presence of a chaperone during physical examination   She was comfortable to proceed with the exam without the the presence of a chaperone    Physical Exam  Constitutional- Vitals -   General appearance-Conscious,Coherent  Eyes- No conjunctival icterus,pupils  round  and reactive to light   ENT-Oral cavity- moist    , Hearing grossly normal   Neck- No thyromegaly ,Trachea -central, No jugular venous distension,   No Carotid Bruit   Cardiovascular -Heart Sounds- Normal  and  no murmur   , No gallop rhythm   Respiratory - Normal Respiratory Effort, Normal breath sounds,  no wheeze , and  no forced expiratory wheeze    Peripheral pitting pedal edema-- none , no calf pain   Gastrointestinal -Soft abdomen, No palpable  masses, Non Tender,Liver,Spleen not palpable. No-- free fluid and shifting dullness  Musculoskeletal- No finger Clubbing. Strength grossly normal   Lymphatic-No Palpable cervical, axillary,Inguinal lymphadenopathy   Psychiatric - normal effect,Orientation  Rt Dorsalis pedis pulses-palpable    Lt Dorsalis pedis pulses- palpable   Rt Posterior tibial pulses -palpable   Left posterior tibial pulses -palpable   Miscellaneous -  no asterixis and  no renal bruit

## 2025-01-08 NOTE — ASSESSMENT & PLAN NOTE
Weight related conditions     Known to have     HTN  Type 2 Diabetes   Hyperlipidemia   Sleep apnea   Acid reflux   Fatty liver   Osteoarthritis    Not troubled with / Not known to have        Gout    Fatty liver       Encouraged maintaining healthy weight for improved health

## 2025-01-08 NOTE — ASSESSMENT & PLAN NOTE
Her mother had what sounds like a pulmonary embolism when she was 49 years of age  She was about 350 lb at that time and had reduced mobility no  long journeys, no cancer around the time of thrombosis, no prior surgery around the time thrombosis, no Hospital stay around the time of thrombosis    Her mother's history of pulmonary embolism seems to be provoked from reduced mobility  No other family history of blood clotting are no personal history of blood clotting    I considered vascular evaluation for possibility of prophylactic IVC filter placement but she was comfortable without

## 2025-01-08 NOTE — HPI
History of present illness- I had the pleasure of meeting this pleasant 54 y.o. lady in the pre op clinic prior to her elective Abdominal surgery. The patient is new to me .Ms Mendieta was accompanied by  Mr Real.    I have obtained the history by speaking to the patient and by reviewing the electronic health records.    Events leading up to surgery / History of presenting illness -    BMI 42.97    I have obtained the information by speaking to her and her caring  in the office  She has had longstanding weight problem for which she tried different diets but she could not sustain the weight loss  She does not like taking weight loss medication on a longstanding basis and had tried Victoza which did not help her hence she stopped that  She works for [employer] and she believes that she is active getting exercise    She wants to have gastric bypass surgery to be able to become healthy and improve her blood pressure control and diabetes control  She also wants to be able to spend good time with her grandchildren who are small    No abdominal pain    Relevant health conditions of significance for the perioperative period/ History of presenting illness -    Type 2 diabetes  Hypertension  Body mass index of 42.97  December 22nd-Nondisplaced fracture of the tip of the lateral malleolus   Hyperlipidemia  Uterine cancer 2019 for which she has a hysterectomy done  Sleep apnea  Varicose vein    Lives with  in a 2 story house and a son  Bedroom downstairs  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

## 2025-01-08 NOTE — ASSESSMENT & PLAN NOTE
Edema- I suggested avoidance of added salt,avoidance of NSAID's, unless advised or ordered  and suggested Limb elevation and stocking use

## 2025-01-08 NOTE — PROGRESS NOTES
[facility] [address]  Progress Note    Patient Name: Gayatri Dela Cruz  MRN: [id number]  Date of Evaluation- 01/10/2025  PCP- Chelsie Temple MD        HPI:  History of present illness- I had the pleasure of meeting this pleasant 54 y.o. lady in the pre op clinic prior to her elective Abdominal surgery. The patient is new to me .Ms Gayatri was accompanied by  Mr Real.    I have obtained the history by speaking to the patient and by reviewing the electronic health records.    Events leading up to surgery / History of presenting illness -    BMI 42.97    I have obtained the information by speaking to her and her caring  in the office  She has had longstanding weight problem for which she tried different diets but she could not sustain the weight loss  She does not like taking weight loss medication on a longstanding basis and had tried Victoza which did not help her hence she stopped that  She works for [employer] and she believes that she is active getting exercise    She wants to have gastric bypass surgery to be able to become healthy and improve her blood pressure control and diabetes control  She also wants to be able to spend good time with her grandchildren who are small    No abdominal pain    Relevant health conditions of significance for the perioperative period/ History of presenting illness -    Type 2 diabetes  Hypertension  Body mass index of 42.97  December 22nd-Nondisplaced fracture of the tip of the lateral malleolus   Hyperlipidemia  Uterine cancer 2019 for which she has a hysterectomy done  Sleep apnea  Varicose vein    Lives with  in a 2 story house and a son  Bedroom downstairs  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  Pets- 2 dogs  Children - none  Pregnancies - none  Miscarriages - C sections - none  Has help post op    Not known to have  Stroke/ Mini stroke ,, Lung problem, Thyroid problem, Kidney problem, deep  vein thrombosis, pulmonary embolism,   , blood vessels stent , mental health problems , gout, allergies              Subjective/ Objective:     Chief complaint-Preoperative evaluation, Perioperative Medical management, complication reduction plan     Active cardiac conditions- none    Revised cardiac risk index predictors- none    Functional capacity -Examples of physical activity -she stays mobile and walks a lot as a part of her work but 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       Review of Systems   Constitutional:  Negative for chills and fever.        No unusual weight changes        HENT:          Sleep apnea     Eyes:         No new visual changes   Respiratory:          No cough , phlegm    No Hemoptysis   Cardiovascular:         As noted   Gastrointestinal:         No overt GI/ blood losses  Bowel movements- Regular   Endocrine:        Prednisone use > 20 mg daily for 3 weeks- none   Genitourinary:  Negative for dysuria.        No urinary hesitancy    Musculoskeletal:         As above      Skin:  Negative for rash.   Neurological:  Negative for syncope.        No unilateral weakness   Hematological:         Current use of Anticoagulants  None   Psychiatric/Behavioral:          No Depression,Anxiety  No eating disorders or purging       No past medical history pertinent negatives.        No anesthesia, bleeding, cardiac problems, PONV with previous surgeries/procedures.  Medications and Allergies reviewed in epic.     FH- No anesthesia,bleeding  ,  in family      Physical Exam    Nov 2024        Left Ventricle: The left ventricle is normal in size.    Stress Protocol: The patient was infused intravenously with dobutamine. The patient received a graduated infusion of the stress  agent beginning at 20.0 mcg/kg/min to a peak dose of 40.0 mcg/kg/min. The peak heart rate was 146.0 bpm, which is 91% of age predicted maximum heart rate. The patient was also given atropine for a total dose of 0.6 mg. The test was stopped because the end of the protocol was reached.    Baseline ECG: The Baseline ECG reveals sinus rhythm. The axis is normal. The ST segments are normal.Poor R wave progression , cant R/O old anterior MI    Stress ECG: There is no ST segment deviation identified during the protocol. There are no arrhythmias during stress. There is hypertensive blood pressure response with stress.    ECG Conclusion: The ECG portion of the study is negative for ischemia.    Post-stress Echo: The left ventricle systolic function is hyperdynamic.    Post-stress Conclusion: The study is normal and negative with no echocardiographic evidence of stress induced ischemia. This study shows a low prognostic risk.         Investigations  Lab and Imaging have been reviewed in epic.    Noted abnormal EKG but are history is not suggestive of coronary artery disease    INR is normal  Review of old records- Was done and information gathered regards to events leading to surgery and health conditions of significance in the perioperative period.        Preoperative cardiac risk assessment-  The patient does not have any active cardiac conditions . Revised cardiac risk index predictors- 0---.Functional capacity is more than 4 Mets. She will be undergoing a Abdominal procedure that carries a Moderate Risk risk     Risk of a major Cardiac event ( Defined as death, myocardial infarction, or cardiac arrest at 30 days after noncardiac surgery), based on RCRI score       -3.9%         No further cardiac work up is indicated prior to proceeding with the surgery          American Society of Anesthesiologists Physical status classification ( ASA ) class: 3     Postoperative pulmonary complication risk assessment:      ARISCAT (  [place]) risk index- risk class -  Low, if duration of surgery is under 3 hours, intermediate, if duration of surgery is over 3 hours          Assessment/Plan:     Carpal tunnel syndrome of right wrist  She has a right hand pain from repetitive movement that she does as a part of her work    Diabetic polyneuropathy associated with type 2 diabetes mellitus  She has tingling and numbness of both feet  She tried gabapentin which did not help  I suggested for her that diabetic control might help her neuropathy    She is currently taking metformin twice a day-based on what she is telling me she might be taking time release and she plans on moving to not time release medication that she wants to take 2 weeks after surgery as she knows that she can not crush the time release medication    She is also taking Actos once a day, Jardiance once a day, Januvia once a day    I suggested for her not to take oral diabetic medication the night before surgery and on the morning of surgery  And she is not to take Jardiance 3 days before surgery-last day of Jardiance use is January 26th    I suggested watching her glucose post surgery as well as before surgery has she may start noticing improvement of glucose control with diet and after surgery    She also has arthritis on her feet    Type 2  Diabetes Mellitus    Hemoglobin A1c- 8.5  Capillary glucose check-None      Diabetes Complications     Microvascular     Not known to have   Diabetes affecting the  Kidneys     No reported open areas on the feet   Feet care suggested     Macrovascular     No stroke/ TIA  Not known to have CAD  No suggestion of  lower extremity claudication      Diabetes Mellitus-I suggest monitoring the glucose in the perioperative period ( Before meals and bed time,if the patient is on oral feeds or every 6 hourly ,if the patient is NPO )  Blood glucose target in hospitalized patients is 140-180. Oral Hypoglycemic agents are generally avoided during the hospital  stay . If glucose is consistently elevated ,I suggest using basal ,prandial Insulin regimen to control the glucose , as elevated glucose can be associated with adverse surgical out comes. Please consider involving Hospital Medicine or Endocrinology ,if any help is needed with Glucose control. Patient will be instructed based on the pre op clinic guidelines  about adjustment of diabetic treatment (If applicable )  considering the NPO status for Surgery      I had educated that uncontrolled DM can cause post op complications,risk of infection, wound healing problem,increased length of stay in hospital and its associated complications.I suggest exercise as much as possible and follow diabetic diet       He is on aspirin 81 mg by mouth once a day and she seems to be taking that for preventative reason-primary prevention after discussing the risks benefits of aspirin use-decided to hold aspirin for surgery          Essential hypertension  She is taking lisinopril      Hypertension-  Blood pressure is acceptable . . I suggest holding ---lisinopril ----- on the morning of the surgery and can continue that  post operatively under blood pressure, electrolyte and renal function monitoring as long as they are acceptable.I suggest addressing pain control as uncontrolled pain can increased blood pressure      She is tolerating the low blood pressure that she had today    I suggested watching her blood pressure as weight loss may reduce her blood pressure medicine requirement  I suggested to watch out for dizziness that may indicate low blood pressure    Mixed hyperlipidemia  HLD-I  suggest continuation of statin during the entire perioperative period.     BMI 40.0-44.9, adult  Weight related conditions     Known to have     HTN  Type 2 Diabetes   Hyperlipidemia   Sleep apnea   Acid reflux   Fatty liver   Osteoarthritis    Not troubled with / Not known to have        Gout    Fatty liver       Encouraged maintaining healthy weight  for improved health     Hiatal hernia with GERD without esophagitis    Does not sound Cardiac   Tips to control reflux discussed      I suggest aspiration precautions    I suggest watching fatty and spicy food  Citrus and tomato based  Alcohol, caffeine, soda  Chocolate, peppermint spearmint  Not to lay down within 3 hours after eating  Not to snack before going to bed  Sleep elevated  Watching weight  Watching anti-inflammatory medication        Fracture  December 22nd-Nondisplaced fracture of the tip of the lateral malleolus   She had a mechanical fall coming out of work on December 22nd  No head injury  She is doing good wearing a boot and using a knee scooter  She is not having any pain and she has an orthopedic evaluation on January 10th  She is not known to have osteopenia or osteoporosis    Sleep apnea  She is unable to pay for the CPAP machine    Sleep apnea     Informed the risk of worsening sleep apnea in the perioperative period    I suggest  caution with usage of medication that can cause respiratory suppression in the perioperative period    Avoidance of  supine sleep, weight gain , alcoholic beverages , care with , sedative , CNS depressant use indicated  since all of these can worsen CHATO         I suggest follow up and suggest caution with usage of medication that can cause respiratory suppression in the perioperative period  potential ramifications of untreated sleep apnea, which could include daytime sleepiness, hypertension, heart disease and stroke were discussed  Suggested not to drive, if feels sleepy           History of uterine cancer  Uterine cancer 2019 for which she has a hysterectomy done  She presented with heavy vaginal bleeding and the evaluation of which showed uterine cancer for which she had a hysterectomy  She still has her ovaries  She is not on hormone replacement therapy to her understanding she is doing good from a uterine cancer standpoint and is medically optimized for the  procedure from a gynecological standpoint    She had radiation to the pelvic area-for treatments in 2019 with no chemo therapy    Varicose vein of leg  Increased risk of thrombosis in the [facility] operative period , compression stocking use discussed     COVID-19  She had COVID 2 times  The 1st was in 2023 and a 2nd was in December 2024    Did not require hospitalization, intubation or supplemental oxygen use   Had been vaccinated   Recovered from COVID    COVID screening     No fever   No cough   No SOB  No sore throat   No loss of taste or smell   No muscle aches   No nausea, vomiting , diarrhea     Edema      Edema- I suggested avoidance of added salt,avoidance of NSAID's, unless advised or ordered  and suggested Limb elevation and stocking use     Fatty liver  She was found to have fatty liver based on an GI evaluation but she is doing good from a GI standpoint      No history  of cirrhosis of liver or suggestions of Liver  decompensation   No Jaundice , dark urine , pale stool   No easy bleeding or bruising    She has what sounds like a non alcohol-related fatty liver disease    She preferred to move on with the surgery without any further liver evaluation    Family history of pulmonary embolism  Her mother had what sounds like a pulmonary embolism when she was 47 years of age  She was about 350 lb at that time and had reduced mobility no  long journeys, no cancer around the time of thrombosis, no prior surgery around the time thrombosis, no Hospital stay around the time of thrombosis    Her mother's history of pulmonary embolism seems to be provoked from reduced mobility  No other family history of blood clotting are no personal history of blood clotting    I considered vascular evaluation for possibility of prophylactic IVC filter placement but she was comfortable without        Preventive perioperative care    Thromboembolic prophylaxis:  Her risk factors for thrombosis include surgical procedure, age, and  varicose veins.I suggest  thromboembolic prophylaxis ( mechanical/pharmacological, weighing the risk benefits of pharmacological agent use considering [facility] procedural bleeding )  during the perioperative period.I suggested being active in the post operative period.      Postoperative pulmonary complication prophylaxis-Risk factors for post operative pulmonary complications include ASA class >2 and proximity of the surgical site to the lungs- I suggest incentive spirometry use, early ambulation, end tidal carbon dioxide monitoring, and pain control so as to avoid diaphragmatic splinting  , oral care , head end of bed elevation      Renal complication prophylaxis-Risk factors for renal complications include diabetes mellitus and hypertension . I suggest keeping her well hydrated    in the perioperative period.       Surgical site Infection Prophylaxis-I  suggest appropriate antibiotic for Prophylaxis against Surgical site infections  No reported Staph infection  Skin antibacterial discussed            This visit was focused on Preoperative evaluation, Perioperative Medical management, complication reduction plans. I suggest that the patient follows up with primary care or relevant sub specialists for ongoing health care.    I appreciate the opportunity to be involved in this patients care. Please feel free to contact me if there were any questions about this consultation.    Patient is optimized    Patient/ care giver/ Family member was instructed to call and update me about any changes to health,  medication, office visits ,testing out side of the [facility] center , hospitalizations between now and surgery      Morena Robins MD  Internal Medicine  [facility]   Cell Phone- [phone]    Plan for optimization    Suggested working on glucose control  Discussed not doing candy    She has right ankle fracture Orthopedic evaluation January 10th    She had radiation to the pelvic area-for  treatments in 2019 with no chemo therapy  Her mother's history of pulmonary embolism seems to be provoked from reduced mobility  No other family history of blood clotting are no personal history of blood clotting  Her history is not suggestive of thrombophilia  I considered vascular evaluation for possibility of prophylactic IVC filter placement but she was comfortable without    I have spent ------ minutes of time which includes, time spent to prepare to see the patient , obtaining history ,performing examination, counseling/Educating the patient , Documenting clinical information in the record    --    1/10/2024- 12 57     Seems to be orthopedic evaluation today

## 2025-01-08 NOTE — PROGRESS NOTES
BARIATRIC NEW PATIENT EVALUATION    CHIEF COMPLAINT:   Morbid obesity with body mass index 42.97 kg/m² and inability to lose weight.    HPI:  Gayatri Dela Cruz is a 54 y.o. morbidly obese female with current BMI 42.97 kg/m² and multiple associated comorbidities including diabetes mellitus type 2 insulin dependent  with complications, essential hypertension, hyperlipidemia, CHATO, GERD, and osteoarthritis. She has struggled with her weight her entire life. Her highest adult weight was 300 lbs at age 52, and her lowest adult weight was 180 lbs at age 27. The patient has tried low-carb diet and Go-Low . The patient was most successful with low-sugar diet with a weight loss of 50 lbs. Her current exercise includes walking 18K steps 5 times a week. 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 Patient states she has a excellent support system. She lives with her  and their 32 year-old son. She is currently employed as a  at [employer] . She endorses a 6 year history of GERD controlled with lifestyle and dietary modifications. EGD 10/4/22 demonstrated a 3-cm hiatal hernia. The patient's goal is to be healthy, lose weight, and play with her grandchildren. Her medical history is also significant for stage 1B endometrial cancer s/p robotic total hysterectomy with BSO 4/1/19 requiring post-op blood transfusion and adjuvant vaginal brachytherapy completed 6/2019.    PAST MEDICAL HISTORY:  Past Medical History:   Diagnosis Date    Arthritis 2021    Dyslipidemia     Encounter for blood transfusion 03/2019    Endometrial cancer 03/19/2019    Essential (primary) hypertension     Hiatal hernia 10/04/2022    Hyperlipidemia 2019    Morbid obesity with BMI of 45.0-49.9, adult     Simple ovarian cyst  2019    Type 2 diabetes mellitus with diabetic polyneuropathy      PAST SURGICAL HISTORY:  Past Surgical History:   Procedure Laterality Date    LAPAROSCOPY-ASSISTED VAGINAL HYSTERECTOMY  2019    robotic     FAMILY HISTORY:  Family History   Problem Relation Name Age of Onset    Mental illness Mother Kaylynn Torres     Miscarriages / Stillbirths Mother Kaylynn Torres     Obesity Mother Kaylynn Torres     Alcohol abuse Father Trevor Torres     Arthritis Father Trevor Torres     Vision loss Father Trevor Torres     Heart disease Father Trevor Torres       SOCIAL HISTORY:  Social History     Socioeconomic History    Marital status:    Tobacco Use    Smoking status: Former     Current packs/day: 0.00     Average packs/day: 0.7 packs/day for 43.2 years (29.1 ttl pk-yrs)     Types: Cigarettes     Start date: 1988     Quit date: 3/20/2016     Years since quittin.8     Passive exposure: Past    Smokeless tobacco: Never   Substance and Sexual Activity    Alcohol use: Not Currently     Comment: social    Drug use: Not Currently     Types: Marijuana     Comment: rarely    Sexual activity: Yes     Partners: Male     Birth control/protection: Post-menopausal, See Surgical Hx, None     Social Drivers of Health     Financial Resource Strain: Low Risk  (2024)    Overall Financial Resource Strain (CARDIA)     Difficulty of Paying Living Expenses: Not hard at all   Food Insecurity: No Food Insecurity (2024)    Hunger Vital Sign     Worried About Running Out of Food in the Last Year: Never true     Ran Out of Food in the Last Year: Never true   Transportation Needs: No Transportation Needs (6/3/2024)    Received from [facility]    PRAPARE - Transportation     Lack of Transportation (Medical): No     Lack of Transportation (Non-Medical): No   Physical Activity: Sufficiently Active (2024)    Exercise Vital Sign     Days of Exercise per Week: 6 days     Minutes of Exercise per Session: 150+ min  "  Stress: No Stress Concern Present (11/1/2024)    [employer] - Occupational Stress Questionnaire     Feeling of Stress : Not at all   Housing Stability: Unknown (11/1/2024)    Housing Stability Vital Sign     Unable to Pay for Housing in the Last Year: No     MEDICATIONS:  Medications have been reviewed and updated in EMR.    ALLERGIES:  Allergies have been reviewed.    Review of Systems   Constitutional:  Negative for chills, diaphoresis, fever and malaise/fatigue.   HENT:  Negative for congestion, hearing loss and sore throat.    Eyes:  Negative for blurred vision and double vision.   Respiratory:  Negative for cough and shortness of breath.    Cardiovascular:  Negative for chest pain, palpitations, orthopnea, claudication, leg swelling and PND.   Gastrointestinal:  Negative for abdominal pain, blood in stool, constipation, diarrhea, heartburn, nausea and vomiting.   Genitourinary:  Negative for dysuria and urgency.   Musculoskeletal:  Negative for back pain, falls, joint pain, myalgias and neck pain.   Skin:  Negative for itching and rash.   Neurological:  Negative for weakness and headaches.   Endo/Heme/Allergies:  Does not bruise/bleed easily.   Psychiatric/Behavioral:  Negative for depression and substance abuse.      Vitals:    01/08/25 0828   BP: (!) 108/58   Pulse: 79   SpO2: 97%   Weight: 122.6 kg (270 lb 4.5 oz)   Height: 5' 6.5" (1.689 m)   PainSc: 0-No pain     Physical Exam  Vitals reviewed.   Constitutional:       General: She is not in acute distress.     Appearance: Normal appearance. She is obese.   HENT:      Head: Normocephalic and atraumatic.   Eyes:      Conjunctiva/sclera: Conjunctivae normal.   Neck:      Thyroid: No thyromegaly.   Cardiovascular:      Rate and Rhythm: Normal rate and regular rhythm.      Heart sounds: Normal heart sounds.   Pulmonary:      Effort: Pulmonary effort is normal. No respiratory distress.      Breath sounds: Normal breath sounds. "   Abdominal:      General: There is no distension.      Palpations: Abdomen is soft.      Tenderness: There is no abdominal tenderness. There is no guarding or rebound.   Musculoskeletal:         General: Normal range of motion.      Cervical back: Normal range of motion and neck supple.      Right lower leg: No edema.      Left lower leg: No edema.   Skin:     General: Skin is warm and dry.      Findings: No rash.   Neurological:      General: No focal deficit present.      Mental Status: She is alert and oriented to person, place, and time.      Gait: Gait is intact.   Psychiatric:         Mood and Affect: Mood and affect normal.         Behavior: Behavior normal.         Cognition and Memory: Memory normal.       DIAGNOSIS:  1. Morbid obesity with body mass index 42.97 kg/m² and inability to lose weight.  2. Co-morbidities: diabetes mellitus type 2 insulin dependent  with complications, essential hypertension, hyperlipidemia, CHATO, GERD, and osteoarthritis.  3. Hiatal hernia.    PLAN:  The patient is a good candidate for Bariatric Surgery. The patient is interested in laparoscopic olesya-en-y gastric bypass due to her diabetes and GERD. We will plan for concomitant hiatal hernia repair at the time of bypass surgery. We discussed the possibility of prohibitive pelvic adhesions given her oncologic and surgical history and if that is the case she had indicated desire for sleeve gastrectomy rather than no bariatric surgery. The surgery and post-op care was discussed in detail with the patient. All questions were answered.    The patient understands that bariatric surgery is a tool to aid in weight loss and that they need to be committed to the diet and exercise post-operatively for successful weight loss. Discussed with patient that bariatric surgery is not the easy way out and that it will take plenty of dedication on the patient's part to be successful. Also discussed the possibility of weight regain if the patient  strays from the diet guidelines or exercise requirements. Patient verbalized understanding and wishes to proceed with the work-up.    Estimated Goal weight is 175-210 lbs which corresponds to approximately 50-75% EWL.    Gely Tatum  1/8/2025

## 2025-01-08 NOTE — ASSESSMENT & PLAN NOTE
Uterine cancer 2019 for which she has a hysterectomy done  She presented with heavy vaginal bleeding and the evaluation of which showed uterine cancer for which she had a hysterectomy  She still has her ovaries  She is not on hormone replacement therapy to her understanding she is doing good from a uterine cancer standpoint and is medically optimized for the procedure from a gynecological standpoint    She had radiation to the pelvic area-for treatments in 2019 with no chemo therapy

## 2025-01-08 NOTE — ASSESSMENT & PLAN NOTE
She has tingling and numbness of both feet  She tried gabapentin which did not help  I suggested for her that diabetic control might help her neuropathy    She is currently taking metformin twice a day-based on what she is telling me she might be taking time release and she plans on moving to not time release medication that she wants to take 2 weeks after surgery as she knows that she can not crush the time release medication    She is also taking Actos once a day, Jardiance once a day, Januvia once a day    I suggested for her not to take oral diabetic medication the night before surgery and on the morning of surgery  And she is not to take Jardiance 3 days before surgery-last day of Jardiance use is January 26th    I suggested watching her glucose post surgery as well as before surgery has she may start noticing improvement of glucose control with diet and after surgery    She also has arthritis on her feet    Type 2  Diabetes Mellitus    Hemoglobin A1c- 8.5  Capillary glucose check-None      Diabetes Complications     Microvascular     Not known to have   Diabetes affecting the  Kidneys     No reported open areas on the feet   Feet care suggested     Macrovascular     No stroke/ TIA  Not known to have CAD  No suggestion of  lower extremity claudication      Diabetes Mellitus-I suggest monitoring the glucose in the perioperative period ( Before meals and bed time,if the patient is on oral feeds or every 6 hourly ,if the patient is NPO )  Blood glucose target in hospitalized patients is 140-180. Oral Hypoglycemic agents are generally avoided during the hospital stay . If glucose is consistently elevated ,I suggest using basal ,prandial Insulin regimen to control the glucose , as elevated glucose can be associated with adverse surgical out comes. Please consider involving Hospital Medicine or Endocrinology ,if any help is needed with Glucose control. Patient will be instructed based on the pre op clinic guidelines   about adjustment of diabetic treatment (If applicable )  considering the NPO status for Surgery      I had educated that uncontrolled DM can cause post op complications,risk of infection, wound healing problem,increased length of stay in hospital and its associated complications.I suggest exercise as much as possible and follow diabetic diet       He is on aspirin 81 mg by mouth once a day and she seems to be taking that for preventative reason-primary prevention after discussing the risks benefits of aspirin use-decided to hold aspirin for surgery

## 2025-01-08 NOTE — PROGRESS NOTES
PT ARRIVED W/ HER SPOUSE AND WE WENT TO SPEAK FOR OUR CONSULT. PT IS USING A KNEE RIDE FROM HER INJURY A FEW WEEKS AGO. I spoke with pt regarding abnormal labs and any medicine needed that would be called in to their pharmacy. We spoke to pt about surgery date of 1/30 pt will preop 1/29 pt will arrive 1/29 and return home 1/31. Pt needs to set up post op appointments w/ their pcp and get agreement signed if needed. Pts liquid diet date is 1/16. I instructed them that  dietary will be calling on 1/16 to go over liquid diet and nutrition booklet. I instructed them on the disability paperwork and how they need to do it and who they send it to. I also told them  we would be calling to go over pages 1-5 of their surgery booklet 1 week before their surgery and the rest of the booklet will be done at their preop visit. A folder full of disability paperwork,restaurant card,MBSAQIP info, and PCP agreement was given to pt. Along with the surgery/nutrition booklets/opioid handout. I instructed them that anything new that needed  to be scheduled to please do so in a timely manner. I asked that if their pcp agreement was not signed yet to please do so and get it back to us asap. If any new orders are needed we need the results also.  Pt verbalized understanding of all info given. I told them any questions or concerns could be sent my way via email or my numbers cell or office. I thanked them for choosing [facility] as a [facility] for their bariatric surgery and ended the nurse visit in my office.They have my cell and office number. I gave them the 24/7 number for emergencies along with my card with my phone numbers and fax on it. Pt agreed to the dates discussed and will move forward as soon as all notes are documented from each discipline.

## 2025-01-08 NOTE — ASSESSMENT & PLAN NOTE
Does not sound Cardiac   Tips to control reflux discussed      I suggest aspiration precautions    I suggest watching fatty and spicy food  Citrus and tomato based  Alcohol, caffeine, soda  Chocolate, peppermint spearmint  Not to lay down within 3 hours after eating  Not to snack before going to bed  Sleep elevated  Watching weight  Watching anti-inflammatory medication

## 2025-01-08 NOTE — ASSESSMENT & PLAN NOTE
December 22nd-Nondisplaced fracture of the tip of the lateral malleolus   She had a mechanical fall coming out of work on December 22nd  No head injury  She is doing good wearing a boot and using a knee scooter  She is not having any pain and she has an orthopedic evaluation on January 10th  She is not known to have osteopenia or osteoporosis

## 2025-01-08 NOTE — ASSESSMENT & PLAN NOTE
She is taking lisinopril      Hypertension-  Blood pressure is acceptable . . I suggest holding ---lisinopril ----- on the morning of the surgery and can continue that  post operatively under blood pressure, electrolyte and renal function monitoring as long as they are acceptable.I suggest addressing pain control as uncontrolled pain can increased blood pressure      She is tolerating the low blood pressure that she had today    I suggested watching her blood pressure as weight loss may reduce her blood pressure medicine requirement  I suggested to watch out for dizziness that may indicate low blood pressure

## 2025-01-10 ENCOUNTER — HOSPITAL ENCOUNTER (OUTPATIENT)
Dept: RADIOLOGY | Facility: HOSPITAL | Age: 55
Discharge: HOME | End: 2025-01-10
Attending: ORTHOPAEDIC SURGERY
Payer: COMMERCIAL

## 2025-01-10 ENCOUNTER — OFFICE VISIT (OUTPATIENT)
Dept: ORTHOPEDICS | Facility: CLINIC | Age: 55
End: 2025-01-10
Payer: COMMERCIAL

## 2025-01-10 DIAGNOSIS — M25.571 RIGHT ANKLE PAIN, UNSPECIFIED CHRONICITY: ICD-10-CM

## 2025-01-10 DIAGNOSIS — S82.64XA NONDISPLACED FRACTURE OF LATERAL MALLEOLUS OF RIGHT FIBULA, INITIAL ENCOUNTER FOR CLOSED FRACTURE: Primary | ICD-10-CM

## 2025-01-10 PROCEDURE — 73610 X-RAY EXAM OF ANKLE: CPT | Mod: TC,PO,RT

## 2025-01-10 PROCEDURE — 99204 OFFICE O/P NEW MOD 45 MIN: CPT | Mod: S$GLB,COE,, | Performed by: ORTHOPAEDIC SURGERY

## 2025-01-10 PROCEDURE — 99999 PR PBB SHADOW E&M-EST. PATIENT-LVL III: CPT | Mod: PBBFAC,COE,, | Performed by: ORTHOPAEDIC SURGERY

## 2025-01-10 PROCEDURE — 73630 X-RAY EXAM OF FOOT: CPT | Mod: TC,PO,RT

## 2025-01-10 PROCEDURE — 73630 X-RAY EXAM OF FOOT: CPT | Mod: 26,RT,COE, | Performed by: RADIOLOGY

## 2025-01-10 PROCEDURE — 73610 X-RAY EXAM OF ANKLE: CPT | Mod: 26,RT,COE, | Performed by: RADIOLOGY

## 2025-01-10 NOTE — PROGRESS NOTES
Status/Diagnosis: Nondisplaed Right lateral malleolus fracture, Oneill A.  Date of Surgery: none  Date of Injury: 12/22/2024  Return visit: 3 weeks  X-rays on Return: WB 3-views Right ankle    Chief Complaint:   Chief Complaint   Patient presents with    Right Ankle - Pain, Injury     Present History:  WORK COMP CASE  Patient presents today via referral from Odalys Gray   Gayatri presents for follow-up after falling in the [place] parking lot on December 22nd, approximately three weeks ago. She reports going to urgent care or the emergency room immediately after the incident, where she was diagnosed with a hairline fracture. She was provided with a short CAM boot and crutches for treatment. Gayatri mentions difficulty using crutches and has been using a knee scooter for longer distances. She notes improved range of motion in the affected area since time of injury.    She mentions having mild neuropathy on the bottom of her foot. Gayatri describes her work at [employer], stating she does a significant amount of walking during shifts, approximately 18,000 to 20,000 steps. She expresses concern about standing for long periods at work.    The fracture is identified as being on the fibula bone, specifically on the outside of the foot. Gayatri has been managing her condition at home, mentioning she does not wear the boot at home and can walk short distances without it.    PMH significant for type 2 diabetes, A1c of 8.5; diabetic neuropathy; hypertension; and hyperlipidemia.  Denies tobacco use.      Past Medical History:   Diagnosis Date    Arthritis 2021    COVID-19     Dyslipidemia     Encounter for blood transfusion 03/2019    Endometrial cancer 03/19/2019    Essential (primary) hypertension     Hiatal hernia 10/04/2022    Hyperlipidemia 2019    Morbid obesity with BMI of 45.0-49.9, adult     Simple ovarian cyst 03/19/2019    Type 2 diabetes mellitus with diabetic polyneuropathy        Past Surgical History:    Procedure Laterality Date    LAPAROSCOPY-ASSISTED VAGINAL HYSTERECTOMY  2019    robotic       Current Outpatient Medications   Medication Sig    acetaminophen (TYLENOL) 500 MG tablet Take 1,000 mg by mouth once daily.    aspirin (ECOTRIN) 81 MG EC tablet Take 81 mg by mouth once daily.    cyanocobalamin (VITAMIN B-12) 100 MCG tablet Take 5,000 mcg by mouth. 2 Times in a week    fish oil-omega-3 fatty acids 300-1,000 mg capsule Take by mouth 2 (two) times daily.    gabapentin (NEURONTIN) 400 MG capsule Take 2 capsules (800 mg total) by mouth every evening.    JANUVIA 50 mg Tab 50 mg once daily.    JARDIANCE 10 mg tablet Take 10 mg by mouth once daily.    lisinopriL (PRINIVIL,ZESTRIL) 40 MG tablet Take 40 mg by mouth once daily.    melatonin 10 mg Chew Take by mouth. Nightly    metFORMIN (GLUCOPHAGE) 1000 MG tablet Take 1,000 mg by mouth 2 (two) times daily. Taking time release    multivitamin (THERAGRAN) per tablet Take 1 tablet by mouth once daily.    pioglitazone (ACTOS) 30 MG tablet Take 30 mg by mouth once daily.    rosuvastatin (CRESTOR) 20 MG tablet Take 20 mg by mouth once daily.     No current facility-administered medications for this visit.       Review of patient's allergies indicates:  No Known Allergies    Family History   Problem Relation Name Age of Onset    Mental illness Mother Kaylynn Torres     Miscarriages / Stillbirths Mother Kaylynn Torres     Obesity Mother Kaylynn Torres     Alcohol abuse Father Trevor Torres     Arthritis Father Trevro Torres     Vision loss Father Trevor Torres     Heart disease Father Trevor Torres     Pulmonary fibrosis Father Trevor Torres        Social History     Socioeconomic History    Marital status:    Tobacco Use    Smoking status: Former     Current packs/day: 0.00     Average packs/day: 0.7 packs/day for 43.2 years (29.1 ttl pk-yrs)     Types: Cigarettes     Start date: 1988     Quit date: 3/20/2016     Years since quittin.8     Passive exposure:  Past    Smokeless tobacco: Never    Tobacco comments:     Quit tobacco smoking in 2013   Substance and Sexual Activity    Alcohol use: Not Currently     Comment: social    Drug use: Not Currently     Types: Marijuana     Comment: rarely    Sexual activity: Yes     Partners: Male     Birth control/protection: Post-menopausal, See Surgical Hx, None     Social Drivers of Health     Financial Resource Strain: Low Risk  (11/1/2024)    Overall Financial Resource Strain (CARDIA)     Difficulty of Paying Living Expenses: Not hard at all   Food Insecurity: No Food Insecurity (11/1/2024)    Hunger Vital Sign     Worried About Running Out of Food in the Last Year: Never true     Ran Out of Food in the Last Year: Never true   Transportation Needs: No Transportation Needs (6/3/2024)    Received from [facility]     Lack of Transportation (Medical): No     Lack of Transportation (Non-Medical): No   Physical Activity: Sufficiently Active (11/1/2024)    Exercise Vital Sign     Days of Exercise per Week: 6 days     Minutes of Exercise per Session: 150+ min   Stress: No Stress Concern Present (11/1/2024)    [employer] - Occupational Stress Questionnaire     Feeling of Stress : Not at all   Housing Stability: Unknown (11/1/2024)    Housing Stability Vital Sign     Unable to Pay for Housing in the Last Year: No       Physical exam:  There were no vitals filed for this visit.  There is no height or weight on file to calculate BMI.  General: In no apparent distress; well developed and well nourished.  HEENT: normocephalic; atraumatic.  Cardiovascular: regular rate.  Respiratory: no increased work of breathing.  Musculoskeletal:   Gait: mild antalgic  Inspection:   Patient with significant residual swelling about the ankle diffusely, lateral> medial.  There is an 8 x 10 mm fracture blister over the anteromedial aspect of the ankle.  Mild tenderness medially on deep palpation.  More  prominent tenderness over the lateral malleolus at the known fracture site.  Ankle range of motion somewhat limited by patient pain/guarding.  No juan luis laxity with anterior drawer testing.  Silfverskiold:  Deferred  Alignment:  Knee: neutral               Ankle: neutral              Hindfoot: neutral              Forefoot: neutral   Strength:              Dorsiflexion 5/5  Plantar flexion 5/5  Inversion 5/5  Eversion 5/5  Sensation:              Good sensation on monofilament testing  ROM:              Ankle: near full with pain at extremes              Subtalar: near full with pain at extremes  Pulses: Palpable pedal pulse                   Imaging Studies/Outside documentation:  I have ordered/reviewed/interpreted the following images/outside documentation:  1. Weight-bearing 3-views of Right foot and ankle:   On my independent review, nondisplaced distal fibular tip avulsion fracture, Oneill a.  Diffuse arthritic changes throughout the midfoot with concomitant dorsal osteophyte formation.  Large enthesophyte at the Achilles insertion and plantar fascia origin with adjacent Corin deformity.  Moderate 1st MTP joint space narrowing.  Ankle mortise remains congruent.        Assessment:  WORK COMP CASE  Gayatri Dela Cruz is a 54 y.o. female with Nondisplaed Right lateral malleolus fracture, Oneill A.     Plan:   Clinical and radiographic findings were discussed.  Recommend conservative management at this time.    Patient may continue to bear full weight.  Weightbear as tolerated right lower extremity.  Discussed rest, ice, compression, elevation, over-the-counter oral NSAIDs as needed for pain.    Patient to remain on light duty for work with no excessive standing.    Follow up in 3 weeks for repeat evaluation and x-ray.  Possible transition out of the boot and into a brace at that time.          This note was created using voice recognition software and may contain grammatical errors.

## 2025-01-12 ENCOUNTER — PATIENT MESSAGE (OUTPATIENT)
Dept: FAMILY MEDICINE | Facility: CLINIC | Age: 55
End: 2025-01-12
Payer: COMMERCIAL

## 2025-01-12 ENCOUNTER — PATIENT MESSAGE (OUTPATIENT)
Dept: ORTHOPEDICS | Facility: CLINIC | Age: 55
End: 2025-01-12
Payer: COMMERCIAL

## 2025-01-13 DIAGNOSIS — S82.64XA NONDISPLACED FRACTURE OF LATERAL MALLEOLUS OF RIGHT FIBULA, INITIAL ENCOUNTER FOR CLOSED FRACTURE: Primary | ICD-10-CM

## 2025-01-14 ENCOUNTER — TELEPHONE (OUTPATIENT)
Dept: FAMILY MEDICINE | Facility: CLINIC | Age: 55
End: 2025-01-14
Payer: COMMERCIAL

## 2025-01-14 NOTE — TELEPHONE ENCOUNTER
----- Message from Terri sent at 1/14/2025 11:27 AM CST -----  Regarding: PCP Agreement Form  Great morning, pt is here saying that she've been trying to get [facility] to sign the PCP Agreement From since November 2024 and she still has yet to receive a call or an update about the form and she needs it ASAP or they'll cancel her surgery. Can someone please give her a call as soon as possible. Thanks

## 2025-01-15 NOTE — PROGRESS NOTES
Audio Only Telehealth Visit     The patient location is: [place]  The chief complaint leading to consultation is: Pre-op liquid diet and nutrition instructions  Visit type: Virtual visit with audio only (telephone)  Total time spent with patient: 15 mins     The reason for the audio only service rather than synchronous audio and video virtual visit was related to technical difficulties or patient preference/necessity.     Each patient to whom I provide medical services by telemedicine is:  (1) informed of the relationship between the physician and patient and the respective role of any other health care provider with respect to management of the patient; and (2) notified that they may decline to receive medical services by telemedicine and may withdraw from such care at any time. Patient verbally consented to receive this service via voice-only telephone call.    This service was not originating from a related E/M service provided within the previous 7 days nor will  to an E/M service or procedure within the next 24 hours or my soonest available appointment.  Prevailing standard of care was able to be met in this audio-only visit.      NUTRITION NOTE    Bariatric Surgeon: Gely aTtum M.D.  Reason for MNT Referral: Pre-op liquid diet and nutrition instructions  Procedure: Tera-en-Y gastric bypass  Date of Surgery: 1/30/25    Pre-op liquid diet  Pt using Premier Protein, SixStar, protein water for preop liquid diet  Fluids include: Water, spicy broth, SF Jecaden-NILSA    Discussion:  -  gms of protein per day  - 600-800 calories per day   - No more than 4 g sugar per protein shake/water/powder  - Sugar-free, decaffeinated, non-carbonated fluids  - No fruits, juices, yogurt or pudding on liquid diet  - No herbal supplements including green tea and fish oils for 2 weeks prior to surgery   - No vitamins for 1 week prior to surgery  - Stop GLP-1 1 week prior to surgery    Post-op instructions  - Reviewed  nutritional guidelines for post-surgery.    - 1 ounce medicine cups and tracking sheet provided for patient to measure and track fluid intake after surgery.  - Start with 1 oz clear liquids every 15 minutes following surgery, and to increase as tolerated. Aim for 48 fl oz clear fluids daily (includes clear protein drinks and protein soups).  - May begin sipping on protein shakes, as long as maintaining 48 fl oz non-caffeinated clear liquids per day.  - Reviewed bariatric vitamin/mineral regimen, starts 1 week after surgery as tolerated.  - Reviewed common nutritional concerns and prevention tips after bariatric surgery.  - Reminded not to lift anything greater than 10 lbs for 6 week post-surgery   - Encouraged PT to walk as much as tolerable after surgery.     Pt will purchase the following vitamins and minerals to start taking one week after being discharged from hospital:    Multivitamin with iron  B-1/Super B complex with mg thiamine  Calcium Citrate with vitamin D   B-12 sublingual     Reviewed dosage and timing of vitamin/mineral guidelines.    Remind pt per nursing and medical team to inform our department if taking antibiotics within the 30 days post bariatric surgery or it any other surgeries/procedures are scheduled within 30 days after bariatric surgery.    Pt verbalized understanding of information provided with appropriate questions and comments.     SESSION TIME: 15 minutes

## 2025-01-16 ENCOUNTER — CLINICAL SUPPORT (OUTPATIENT)
Dept: BARIATRICS | Facility: CLINIC | Age: 55
End: 2025-01-16
Payer: COMMERCIAL

## 2025-01-16 DIAGNOSIS — E11.9 TYPE 2 DIABETES MELLITUS WITHOUT COMPLICATION, WITH LONG-TERM CURRENT USE OF INSULIN: ICD-10-CM

## 2025-01-16 DIAGNOSIS — Z71.3 DIETARY COUNSELING AND SURVEILLANCE: Primary | ICD-10-CM

## 2025-01-16 DIAGNOSIS — Z79.4 TYPE 2 DIABETES MELLITUS WITHOUT COMPLICATION, WITH LONG-TERM CURRENT USE OF INSULIN: ICD-10-CM

## 2025-01-16 DIAGNOSIS — I10 ESSENTIAL HYPERTENSION: ICD-10-CM

## 2025-01-16 DIAGNOSIS — E66.01 CLASS 3 SEVERE OBESITY DUE TO EXCESS CALORIES WITH SERIOUS COMORBIDITY AND BODY MASS INDEX (BMI) OF 40.0 TO 44.9 IN ADULT: ICD-10-CM

## 2025-01-16 DIAGNOSIS — E66.813 CLASS 3 SEVERE OBESITY DUE TO EXCESS CALORIES WITH SERIOUS COMORBIDITY AND BODY MASS INDEX (BMI) OF 40.0 TO 44.9 IN ADULT: ICD-10-CM

## 2025-01-29 ENCOUNTER — TELEPHONE (OUTPATIENT)
Dept: BARIATRICS | Facility: CLINIC | Age: 55
End: 2025-01-29
Payer: COMMERCIAL

## 2025-01-29 ENCOUNTER — OFFICE VISIT (OUTPATIENT)
Dept: BARIATRICS | Facility: CLINIC | Age: 55
End: 2025-01-29
Payer: COMMERCIAL

## 2025-01-29 ENCOUNTER — ANESTHESIA EVENT (OUTPATIENT)
Dept: SURGERY | Facility: HOSPITAL | Age: 55
DRG: 621 | End: 2025-01-29
Payer: COMMERCIAL

## 2025-01-29 VITALS
RESPIRATION RATE: 20 BRPM | OXYGEN SATURATION: 99 % | BODY MASS INDEX: 40.07 KG/M2 | HEART RATE: 75 BPM | HEIGHT: 67 IN | SYSTOLIC BLOOD PRESSURE: 134 MMHG | WEIGHT: 255.31 LBS | DIASTOLIC BLOOD PRESSURE: 78 MMHG

## 2025-01-29 DIAGNOSIS — E66.01 CLASS 3 SEVERE OBESITY DUE TO EXCESS CALORIES WITH SERIOUS COMORBIDITY AND BODY MASS INDEX (BMI) OF 40.0 TO 44.9 IN ADULT: Primary | ICD-10-CM

## 2025-01-29 DIAGNOSIS — K21.9 HIATAL HERNIA WITH GERD WITHOUT ESOPHAGITIS: ICD-10-CM

## 2025-01-29 DIAGNOSIS — K44.9 HIATAL HERNIA WITH GERD WITHOUT ESOPHAGITIS: ICD-10-CM

## 2025-01-29 DIAGNOSIS — M19.90 OSTEOARTHRITIS, UNSPECIFIED OSTEOARTHRITIS TYPE, UNSPECIFIED SITE: ICD-10-CM

## 2025-01-29 DIAGNOSIS — E66.813 CLASS 3 SEVERE OBESITY DUE TO EXCESS CALORIES WITH SERIOUS COMORBIDITY AND BODY MASS INDEX (BMI) OF 40.0 TO 44.9 IN ADULT: Primary | ICD-10-CM

## 2025-01-29 DIAGNOSIS — E11.42 DIABETIC POLYNEUROPATHY ASSOCIATED WITH TYPE 2 DIABETES MELLITUS: ICD-10-CM

## 2025-01-29 DIAGNOSIS — Z79.4 TYPE 2 DIABETES MELLITUS WITHOUT COMPLICATION, WITH LONG-TERM CURRENT USE OF INSULIN: ICD-10-CM

## 2025-01-29 DIAGNOSIS — E78.2 MIXED HYPERLIPIDEMIA: ICD-10-CM

## 2025-01-29 DIAGNOSIS — I10 ESSENTIAL HYPERTENSION: ICD-10-CM

## 2025-01-29 DIAGNOSIS — E11.9 TYPE 2 DIABETES MELLITUS WITHOUT COMPLICATION, WITH LONG-TERM CURRENT USE OF INSULIN: ICD-10-CM

## 2025-01-29 DIAGNOSIS — G47.30 SLEEP APNEA, UNSPECIFIED TYPE: ICD-10-CM

## 2025-01-29 DIAGNOSIS — Z98.84 S/P BARIATRIC SURGERY: ICD-10-CM

## 2025-01-29 PROCEDURE — 99215 OFFICE O/P EST HI 40 MIN: CPT | Mod: 57,S$GLB,COE, | Performed by: SURGERY

## 2025-01-29 PROCEDURE — 99999 PR PBB SHADOW E&M-EST. PATIENT-LVL IV: CPT | Mod: PBBFAC,COE,, | Performed by: SURGERY

## 2025-01-29 RX ORDER — GABAPENTIN 400 MG/1
800 CAPSULE ORAL 2 TIMES DAILY
Qty: 20 CAPSULE | Refills: 0 | Status: SHIPPED | OUTPATIENT
Start: 2025-01-29 | End: 2025-02-03

## 2025-01-29 RX ORDER — SODIUM CHLORIDE 9 MG/ML
INJECTION, SOLUTION INTRAVENOUS CONTINUOUS
Status: CANCELLED | OUTPATIENT
Start: 2025-01-29

## 2025-01-29 RX ORDER — GABAPENTIN 250 MG/5ML
300 SOLUTION ORAL 2 TIMES DAILY
Status: CANCELLED | OUTPATIENT
Start: 2025-01-29

## 2025-01-29 RX ORDER — METRONIDAZOLE 500 MG/100ML
500 INJECTION, SOLUTION INTRAVENOUS
Status: CANCELLED | OUTPATIENT
Start: 2025-01-29

## 2025-01-29 RX ORDER — URSODIOL 500 MG/1
500 TABLET, FILM COATED ORAL DAILY
Qty: 30 TABLET | Refills: 5 | Status: ON HOLD | OUTPATIENT
Start: 2025-01-29 | End: 2025-01-30 | Stop reason: ALTCHOICE

## 2025-01-29 RX ORDER — DEXTROMETHORPHAN/PSEUDOEPHED 2.5-7.5/.8
40 DROPS ORAL 4 TIMES DAILY PRN
Status: CANCELLED | OUTPATIENT
Start: 2025-01-29

## 2025-01-29 RX ORDER — OXYCODONE HCL 5 MG/5 ML
5 SOLUTION, ORAL ORAL EVERY 6 HOURS PRN
Status: CANCELLED | OUTPATIENT
Start: 2025-01-29

## 2025-01-29 RX ORDER — OMEPRAZOLE 40 MG/1
40 CAPSULE, DELAYED RELEASE ORAL EVERY MORNING
Qty: 30 CAPSULE | Refills: 2 | Status: SHIPPED | OUTPATIENT
Start: 2025-01-29

## 2025-01-29 RX ORDER — ONDANSETRON 8 MG/1
8 TABLET, ORALLY DISINTEGRATING ORAL EVERY 6 HOURS PRN
Qty: 30 TABLET | Refills: 0 | Status: SHIPPED | OUTPATIENT
Start: 2025-01-29

## 2025-01-29 RX ORDER — MUPIROCIN 20 MG/G
OINTMENT TOPICAL
Status: CANCELLED | OUTPATIENT
Start: 2025-01-29

## 2025-01-29 RX ORDER — ACETAMINOPHEN 650 MG/20.3ML
500 LIQUID ORAL
Status: CANCELLED | OUTPATIENT
Start: 2025-01-29

## 2025-01-29 RX ORDER — ACETAMINOPHEN 650 MG/20.3ML
1000 LIQUID ORAL EVERY 8 HOURS
Status: CANCELLED | OUTPATIENT
Start: 2025-01-29

## 2025-01-29 RX ORDER — HEPARIN SODIUM 5000 [USP'U]/ML
5000 INJECTION, SOLUTION INTRAVENOUS; SUBCUTANEOUS ONCE
Status: CANCELLED | OUTPATIENT
Start: 2025-01-29 | End: 2025-01-29

## 2025-01-29 RX ORDER — PROCHLORPERAZINE EDISYLATE 5 MG/ML
5 INJECTION INTRAMUSCULAR; INTRAVENOUS EVERY 6 HOURS PRN
Status: CANCELLED | OUTPATIENT
Start: 2025-01-29

## 2025-01-29 RX ORDER — ENOXAPARIN SODIUM 100 MG/ML
40 INJECTION SUBCUTANEOUS EVERY 12 HOURS
Status: CANCELLED | OUTPATIENT
Start: 2025-01-29

## 2025-01-29 RX ORDER — PANTOPRAZOLE SODIUM 40 MG/10ML
40 INJECTION, POWDER, LYOPHILIZED, FOR SOLUTION INTRAVENOUS 2 TIMES DAILY
Status: CANCELLED | OUTPATIENT
Start: 2025-01-29

## 2025-01-29 RX ORDER — FAMOTIDINE 10 MG/ML
20 INJECTION INTRAVENOUS ONCE
Status: CANCELLED | OUTPATIENT
Start: 2025-01-29 | End: 2025-01-29

## 2025-01-29 RX ORDER — KETOROLAC TROMETHAMINE 15 MG/ML
15 INJECTION, SOLUTION INTRAMUSCULAR; INTRAVENOUS ONCE
Status: CANCELLED | OUTPATIENT
Start: 2025-01-29 | End: 2025-01-29

## 2025-01-29 RX ORDER — ACETAMINOPHEN 10 MG/ML
1000 INJECTION, SOLUTION INTRAVENOUS ONCE
Status: CANCELLED | OUTPATIENT
Start: 2025-01-29 | End: 2025-01-29

## 2025-01-29 RX ORDER — SODIUM CHLORIDE, SODIUM LACTATE, POTASSIUM CHLORIDE, CALCIUM CHLORIDE 600; 310; 30; 20 MG/100ML; MG/100ML; MG/100ML; MG/100ML
INJECTION, SOLUTION INTRAVENOUS CONTINUOUS
Status: CANCELLED | OUTPATIENT
Start: 2025-01-29

## 2025-01-29 RX ORDER — LIDOCAINE HYDROCHLORIDE 10 MG/ML
1 INJECTION, SOLUTION EPIDURAL; INFILTRATION; INTRACAUDAL; PERINEURAL ONCE
Status: CANCELLED | OUTPATIENT
Start: 2025-01-29 | End: 2025-01-29

## 2025-01-29 RX ORDER — ONDANSETRON HYDROCHLORIDE 2 MG/ML
8 INJECTION, SOLUTION INTRAVENOUS EVERY 6 HOURS
Status: CANCELLED | OUTPATIENT
Start: 2025-01-29

## 2025-01-29 RX ORDER — SCOLOPAMINE TRANSDERMAL SYSTEM 1 MG/1
1 PATCH, EXTENDED RELEASE TRANSDERMAL ONCE
Status: CANCELLED | OUTPATIENT
Start: 2025-01-29 | End: 2025-01-29

## 2025-01-29 NOTE — PROGRESS NOTES
History & Physical    SUBJECTIVE:     History of Present Illness:  Gayatri Dela Cruz is a 54 year-old morbidly obese female with BMI 40.59 kg/m² and multiple associated comorbidities including HTN, HLD, IDDM2 with polyneuropathy, CHATO, OA, NAFLD, and hiatal hernia with GERD, who presents for pre-operative exam for weight loss surgery. She has failed multiple attempts at non-surgical methods of weight loss and has completed the [facility] program which she started on 1/8/25 at which time her BMI was 42.97 kg/m². She meets NIH consensus criteria for bariatric surgery and is interested in undergoing laparoscopic olesya-en-y gastric bypass. Her medical history is also significant for stage 1B endometrial cancer s/p robotic total hysterectomy with BSO 4/1/19 requiring post-op blood transfusion and adjuvant vaginal brachytherapy completed 6/2019.     Chief Complaint   Patient presents with    Pre-op Exam     RYGB 1/30/25     Review of patient's allergies indicates:  No Known Allergies    Current Outpatient Medications   Medication Sig    acetaminophen (TYLENOL) 500 MG tablet Take 1,000 mg by mouth once daily.    aspirin (ECOTRIN) 81 MG EC tablet Take 81 mg by mouth once daily.    cyanocobalamin (VITAMIN B-12) 100 MCG tablet Take 5,000 mcg by mouth. 2 Times in a week    fish oil-omega-3 fatty acids 300-1,000 mg capsule Take by mouth 2 (two) times daily.    gabapentin (NEURONTIN) 400 MG capsule Take 2 capsules (800 mg total) by mouth every evening.    JANUVIA 50 mg Tab 50 mg once daily.    JARDIANCE 10 mg tablet Take 10 mg by mouth once daily.    lisinopriL (PRINIVIL,ZESTRIL) 40 MG tablet Take 40 mg by mouth once daily.    melatonin 10 mg Chew Take by mouth. Nightly    metFORMIN (GLUCOPHAGE) 1000 MG tablet Take 1,000 mg by mouth 2 (two) times daily. Taking time release    multivitamin (THERAGRAN) per tablet Take 1 tablet by mouth once daily.    pioglitazone (ACTOS) 30 MG tablet Take 30 mg by mouth once daily.  "   rosuvastatin (CRESTOR) 20 MG tablet Take 20 mg by mouth once daily.     No current facility-administered medications for this visit.     Past Medical History:   Diagnosis Date    Arthritis 2021    COVID-19     Dyslipidemia     Encounter for blood transfusion 03/2019    Endometrial cancer 03/19/2019    Essential (primary) hypertension     Hiatal hernia 10/04/2022    Hyperlipidemia 2019    Morbid obesity with BMI of 45.0-49.9, adult     Simple ovarian cyst 03/19/2019    Type 2 diabetes mellitus with diabetic polyneuropathy      Past Surgical History:   Procedure Laterality Date    LAPAROSCOPY-ASSISTED VAGINAL HYSTERECTOMY  04/01/2019    robotic     Review of Systems   Constitutional: Negative.    HENT: Negative.     Eyes: Negative.    Respiratory: Negative.     Cardiovascular: Negative.    Gastrointestinal: Negative.    Genitourinary: Negative.    Musculoskeletal:  Positive for falls (R foot fracture 12/22/24).   Skin: Negative.    Neurological: Negative.    Endo/Heme/Allergies: Negative.    Psychiatric/Behavioral: Negative.       OBJECTIVE:     Vitals:    01/29/25 0902   BP: 134/78   Pulse: 75   Resp: 20   SpO2: 99%   Weight: 115.8 kg (255 lb 4.7 oz)   Height: 5' 6.5" (1.689 m)     Physical Exam  Vitals reviewed.   Constitutional:       General: She is not in acute distress.     Appearance: Normal appearance. She is obese.   HENT:      Head: Normocephalic and atraumatic.   Eyes:      Conjunctiva/sclera: Conjunctivae normal.   Neck:      Thyroid: No thyromegaly.   Cardiovascular:      Rate and Rhythm: Normal rate and regular rhythm.      Heart sounds: Normal heart sounds.   Pulmonary:      Effort: Pulmonary effort is normal. No respiratory distress.      Breath sounds: Normal breath sounds.   Abdominal:      General: There is no distension.      Palpations: Abdomen is soft.      Tenderness: There is no abdominal tenderness. There is no guarding or rebound.   Musculoskeletal:      Cervical back: Normal range of " motion and neck supple.      Comments: R foot boot   Skin:     General: Skin is warm and dry.      Findings: No rash.   Neurological:      General: No focal deficit present.      Mental Status: She is alert and oriented to person, place, and time.      Gait: Gait is intact.   Psychiatric:         Mood and Affect: Mood and affect normal.         Behavior: Behavior normal.         Cognition and Memory: Memory normal.        Laboratory  CBC: Reviewed  BMP: Reviewed  LFTs: Reviewed  Bariatric Labs: Reviewed    Diagnostic Results:  Labs: Reviewed  ECG: Reviewed  X-Ray: Reviewed  EGD: Reviewed      Dietitian: Patient has participated in pre-operative nutritional program with understanding of necessary lifelong dietary changes required with surgery.    Psych: No overt contraindications to bariatric surgery, patient has completed psychological evaluation and is able to give informed consent.    PCP: Medically cleared for surgery.     ASSESSMENT/PLAN:     Morbid obesity with failure of medical conservative therapy.    Co Morbid Conditions:   Patient Active Problem List   Diagnosis    Type 2 diabetes mellitus with hyperglycemia, without long-term current use of insulin    Diabetic polyneuropathy associated with type 2 diabetes mellitus    Essential hypertension    Mixed hyperlipidemia    Carpal tunnel syndrome of right wrist    Decreased libido without sexual dysfunction    Hiatal hernia with GERD without esophagitis    Lung nodules    BMI 40.0-44.9, adult    Fracture    Sleep apnea    Varicose vein of leg    Edema    Fatty liver    Family history of pulmonary embolism     Patient wishes to undergo laparoscopic olesya-en-y gastric bypass with possible concomitant hiatal hernia repair. She is scheduled for 1/30/25. She started the pre-op liquid diet on 1/16/25.     The patient was informed that risks of bariatric surgery include, but are not limited to: bleeding, infection, injury to intraabdominal structures such as bowel,  stomach, esophagus, liver, and spleen; DVT/PE, cardiopulmonary complications such as MI, stroke or PNA; marginal ulcers which can cause pain, bleed or perforate, stricture requiring dilations, incisional hernia, gallstones, exacerbation of mood disorders, vitamin/mineral deficiencies, dumping syndrome, failure of weight loss or weight regain, possible conversion to an open operation, and anastomotic or staple-line leak which may require surgical or endoscopic interventions, drains, antibiotics and NPO status with IV nutrition. Risks of general anesthesia with endotracheal intubation including but are not limited to heart attack, stroke, inability to wean off ventilator, and neurologic disability. We discussed increased risk of marginal ulcer in the setting of smoking, NSAID use, and immunosuppression including steroids.     We discussed the anatomy and natural course of hiatal hernias and indications for repair. We discussed the technical and convalescent aspects of laparoscopic hiatal hernia repair with antireflux fundoplication as well as the risks, benefits and alternatives, the risks including but not limited to bleeding, infection, injury to bowel or spleen, esophageal or gastric perforation, vagal nerve injury which can result in gastroparesis or dumping syndrome, pneumothorax, dysphagia, and recurrence of hiatal hernia and/or GERD.      We discussed that our goal is to ameliorate her medical problems and not to obtain a specific body mass index. All questions were answered to her satisfaction and she has elected to proceed with surgery. Consent for surgery and blood transfusion were signed. Prescriptions for ondansetron, omeprazole, ursodiol, and acetaminophen were sent to the pharmacy for bedside delivery. Perioperative gabapentin was sent to the pharmacy for pick-up. Patient discussed perioperative instructions with the Bariatric RN.     Gely Tatum  1/29/2025

## 2025-01-29 NOTE — TELEPHONE ENCOUNTER
We discussed in person at preop  pages 6-23 of the Obesity Wellness and Surgical Weight Loss booklet, paying particular attention to the highlighted areas. We went over the new handout regarding the new policy for the opioid crisis and the new protocol we put into effect involving the new way to prescribe pain medicine post operatively. I instructed them to  their preop medicine along with the pre surgery drink they need to get in the pharmacy after their appointment. I walked them thru their day of surgery as if I was the patient explaining it to them.I instructed them on the water protocol they will start to follow once in their room after PACU and once reaching 480z they would then start their protien shakes/water. We went over the goals of where they need to be at week one and two with the amount of protein and water each day they need to take in and why. I told them no antibiotics or procedures for 1 month. However, they can have a dental cleaning. We discussed the new pain med protocol, the meds they would be d/c on and the rule of anything smaller than the tip of an eraser will need to be crushed for 2 weeks after surgery. I encouraged them to take use of the program of their medicines being delivered at their bedside before d/c. I also warned them that the hospital could be full so they may stay in PACU for longer than 1-2 hours.I told them that they would be d/c in between lunch and dinner the next day after surgery. We reviewed the surgery center location,important numbers their family members need to have incase of an emergency. I Instructed pt and/or family member to call 911 in case of an emergency while at the [facility]. I got the contact phone numbers needed. We talked about caring for their abdominal wounds,preventing blood clots, notifying me of s/s of infection, SOB or difficulty breathing, chest pain, vomiting up or pooping blood or anything out of the ordinary, and lastly starting their  vitamins 1 week post op and bringing their  vitamins with them for their post op diet appointment. I gave them their post op appointment reminders, little medicine cups,and the pcp agreement to bring with them to each post op appointment with their PCP. I told them to make sure we get the ov notes and labs from each post op visit with their PCP. After reviewing the booklet together, all questions and concerns were addressed and answered. Please see my preop call note that I charted for sgy time, arrival time at the [facility], and pre sgy drink/med time. Patient verbalized understanding of the above information  and was instructed to contact me with any further questions or concerns before surgery.  Pt will follow medicine protocol that was given to her by Dr. Robins for surgery. She was told what to hold and want to take.

## 2025-01-29 NOTE — H&P (VIEW-ONLY)
History & Physical    SUBJECTIVE:     History of Present Illness:  Gayatri Dela Cruz is a 54 year-old morbidly obese female with BMI 40.59 kg/m² and multiple associated comorbidities including HTN, HLD, IDDM2 with polyneuropathy, CHATO, OA, NAFLD, and hiatal hernia with GERD, who presents for pre-operative exam for weight loss surgery. She has failed multiple attempts at non-surgical methods of weight loss and has completed the [facility] program which she started on 1/8/25 at which time her BMI was 42.97 kg/m². She meets NIH consensus criteria for bariatric surgery and is interested in undergoing laparoscopic olesya-en-y gastric bypass. Her medical history is also significant for stage 1B endometrial cancer s/p robotic total hysterectomy with BSO 4/1/19 requiring post-op blood transfusion and adjuvant vaginal brachytherapy completed 6/2019.     Chief Complaint   Patient presents with    Pre-op Exam     RYGB 1/30/25     Review of patient's allergies indicates:  No Known Allergies    Current Outpatient Medications   Medication Sig    acetaminophen (TYLENOL) 500 MG tablet Take 1,000 mg by mouth once daily.    aspirin (ECOTRIN) 81 MG EC tablet Take 81 mg by mouth once daily.    cyanocobalamin (VITAMIN B-12) 100 MCG tablet Take 5,000 mcg by mouth. 2 Times in a week    fish oil-omega-3 fatty acids 300-1,000 mg capsule Take by mouth 2 (two) times daily.    gabapentin (NEURONTIN) 400 MG capsule Take 2 capsules (800 mg total) by mouth every evening.    JANUVIA 50 mg Tab 50 mg once daily.    JARDIANCE 10 mg tablet Take 10 mg by mouth once daily.    lisinopriL (PRINIVIL,ZESTRIL) 40 MG tablet Take 40 mg by mouth once daily.    melatonin 10 mg Chew Take by mouth. Nightly    metFORMIN (GLUCOPHAGE) 1000 MG tablet Take 1,000 mg by mouth 2 (two) times daily. Taking time release    multivitamin (THERAGRAN) per tablet Take 1 tablet by mouth once daily.    pioglitazone (ACTOS) 30 MG tablet Take 30 mg by mouth once daily.  "   rosuvastatin (CRESTOR) 20 MG tablet Take 20 mg by mouth once daily.     No current facility-administered medications for this visit.     Past Medical History:   Diagnosis Date    Arthritis 2021    COVID-19     Dyslipidemia     Encounter for blood transfusion 03/2019    Endometrial cancer 03/19/2019    Essential (primary) hypertension     Hiatal hernia 10/04/2022    Hyperlipidemia 2019    Morbid obesity with BMI of 45.0-49.9, adult     Simple ovarian cyst 03/19/2019    Type 2 diabetes mellitus with diabetic polyneuropathy      Past Surgical History:   Procedure Laterality Date    LAPAROSCOPY-ASSISTED VAGINAL HYSTERECTOMY  04/01/2019    robotic     Review of Systems   Constitutional: Negative.    HENT: Negative.     Eyes: Negative.    Respiratory: Negative.     Cardiovascular: Negative.    Gastrointestinal: Negative.    Genitourinary: Negative.    Musculoskeletal:  Positive for falls (R foot fracture 12/22/24).   Skin: Negative.    Neurological: Negative.    Endo/Heme/Allergies: Negative.    Psychiatric/Behavioral: Negative.       OBJECTIVE:     Vitals:    01/29/25 0902   BP: 134/78   Pulse: 75   Resp: 20   SpO2: 99%   Weight: 115.8 kg (255 lb 4.7 oz)   Height: 5' 6.5" (1.689 m)     Physical Exam  Vitals reviewed.   Constitutional:       General: She is not in acute distress.     Appearance: Normal appearance. She is obese.   HENT:      Head: Normocephalic and atraumatic.   Eyes:      Conjunctiva/sclera: Conjunctivae normal.   Neck:      Thyroid: No thyromegaly.   Cardiovascular:      Rate and Rhythm: Normal rate and regular rhythm.      Heart sounds: Normal heart sounds.   Pulmonary:      Effort: Pulmonary effort is normal. No respiratory distress.      Breath sounds: Normal breath sounds.   Abdominal:      General: There is no distension.      Palpations: Abdomen is soft.      Tenderness: There is no abdominal tenderness. There is no guarding or rebound.   Musculoskeletal:      Cervical back: Normal range of " motion and neck supple.      Comments: R foot boot   Skin:     General: Skin is warm and dry.      Findings: No rash.   Neurological:      General: No focal deficit present.      Mental Status: She is alert and oriented to person, place, and time.      Gait: Gait is intact.   Psychiatric:         Mood and Affect: Mood and affect normal.         Behavior: Behavior normal.         Cognition and Memory: Memory normal.        Laboratory  CBC: Reviewed  BMP: Reviewed  LFTs: Reviewed  Bariatric Labs: Reviewed    Diagnostic Results:  Labs: Reviewed  ECG: Reviewed  X-Ray: Reviewed  EGD: Reviewed      Dietitian: Patient has participated in pre-operative nutritional program with understanding of necessary lifelong dietary changes required with surgery.    Psych: No overt contraindications to bariatric surgery, patient has completed psychological evaluation and is able to give informed consent.    PCP: Medically cleared for surgery.     ASSESSMENT/PLAN:     Morbid obesity with failure of medical conservative therapy.    Co Morbid Conditions:   Patient Active Problem List   Diagnosis    Type 2 diabetes mellitus with hyperglycemia, without long-term current use of insulin    Diabetic polyneuropathy associated with type 2 diabetes mellitus    Essential hypertension    Mixed hyperlipidemia    Carpal tunnel syndrome of right wrist    Decreased libido without sexual dysfunction    Hiatal hernia with GERD without esophagitis    Lung nodules    BMI 40.0-44.9, adult    Fracture    Sleep apnea    Varicose vein of leg    Edema    Fatty liver    Family history of pulmonary embolism     Patient wishes to undergo laparoscopic olesya-en-y gastric bypass with possible concomitant hiatal hernia repair. She is scheduled for 1/30/25. She started the pre-op liquid diet on 1/16/25.     The patient was informed that risks of bariatric surgery include, but are not limited to: bleeding, infection, injury to intraabdominal structures such as bowel,  stomach, esophagus, liver, and spleen; DVT/PE, cardiopulmonary complications such as MI, stroke or PNA; marginal ulcers which can cause pain, bleed or perforate, stricture requiring dilations, incisional hernia, gallstones, exacerbation of mood disorders, vitamin/mineral deficiencies, dumping syndrome, failure of weight loss or weight regain, possible conversion to an open operation, and anastomotic or staple-line leak which may require surgical or endoscopic interventions, drains, antibiotics and NPO status with IV nutrition. Risks of general anesthesia with endotracheal intubation including but are not limited to heart attack, stroke, inability to wean off ventilator, and neurologic disability. We discussed increased risk of marginal ulcer in the setting of smoking, NSAID use, and immunosuppression including steroids.     We discussed the anatomy and natural course of hiatal hernias and indications for repair. We discussed the technical and convalescent aspects of laparoscopic hiatal hernia repair with antireflux fundoplication as well as the risks, benefits and alternatives, the risks including but not limited to bleeding, infection, injury to bowel or spleen, esophageal or gastric perforation, vagal nerve injury which can result in gastroparesis or dumping syndrome, pneumothorax, dysphagia, and recurrence of hiatal hernia and/or GERD.      We discussed that our goal is to ameliorate her medical problems and not to obtain a specific body mass index. All questions were answered to her satisfaction and she has elected to proceed with surgery. Consent for surgery and blood transfusion were signed. Prescriptions for ondansetron, omeprazole, ursodiol, and acetaminophen were sent to the pharmacy for bedside delivery. Perioperative gabapentin was sent to the pharmacy for pick-up. Patient discussed perioperative instructions with the Bariatric RN.     Gely Tatum  1/29/2025

## 2025-01-29 NOTE — TELEPHONE ENCOUNTER
Notified patient of arrival time 800 AND to MEET AT [facility] ON THE 1ST FLOOR WITH THE AN. THEIR  expected surgery start time IS FOR  1010 on 1/30. Instructed patient IN PERSON regarding pre-op instructions AT THEIR PRE OP VISIT ON 1/29 NPO after midnight INCLUDING NO GUM CANDY OR MINTS.THEY can have a SMALL sip of water TO TAKE THEIR MEDS WITH. HIBICLENS shower DAY BEFORE AND MORNING OF. WE TALKED ABOUT WHICH MEDS TO STOP.ANESTHESIA WILL CALL THEM TO TELL THEM WHAT TO TAKE AND NOT TAKE. Reminded pt to drink pre-surgery DRINK AND TAKE THE LIQUID GABAPENTIN THAT WAS ORDERED at 730. Instructed patient on the s/s of dehydration and for patient to call at the first sign of dehydration, S/S OF INFECTION. PT KNOWS NOT TO PICK AT THE SURGICAL SITES. PATIENT WAS INSTRUCTED TO GO TO CALL 911 FOR ANT EMERGENCIES AT THE [facility]. EXAMPLES (SOB,CHEST PAIN,BLOOD IN STOOL/VOMITING BLOOD). ALSO INSTRUCTED PT THAT I WILL SEE THEM 2 DAYS AFTER SURGERY IN MY OFFICE. I GAVE THEM THE  APPOINTMENT REMINDERS FOR THEIR  1 week post op APPOINTMENTS ALREADY SCHEDULED. EXPLAINED TO THEM HOW MUCH PROTEIN/WATER IS NEEDED (48-64OZ ) AT END OF WEEK ONE WITH WATER AND 30-40G PROTEIN BY END OF WEEK ONE IS A GOOD PLACE TO BE. WEEK 2 IS 66 OZ WATER AND 60-90G PROTEIN. INSTRUCTED THEM TO BRING THEIR VITAMINS WITH THEM WHEN THEY COME FOR THEIR 1 WEEK APPOINTMENT WITH DIETARY. EXPLAINED TO THEM WHY THEY NEED THE AMOUNT OF WATER/PROTEIN TO ENSURE ADEQUATE HYDRATION AND WEIGHT LOSS. MEDICINE CUPS GIVEN TO PT. Reminded patient not to take antibiotics for 30 days following surgery or schedule elective procedures involving anesthesia/sedation for 30 days following surgery unless checking with the bariatric clinic first. NO SHOWER FOR 48 HOURS. YOU CAN SHOWER ON SATURDAY 2/1/25. 2/13/25 is your 2 week post op appointment with your PCP this is also the day that you may start to take a tub bath and start your UROSODIL. Pt verbalized understanding. Pt given  office phone number for any additional questions/concerns. PT. HAS MY CELL AND OFFICE NUMBER FOR ANY COMPLICATIONS.

## 2025-01-29 NOTE — TELEPHONE ENCOUNTER
New arrival time. 7am per dr. Tatum. Please arrive at piano at 645  and at 615 please take your gabapentin and your drink.

## 2025-01-30 ENCOUNTER — HOSPITAL ENCOUNTER (INPATIENT)
Facility: HOSPITAL | Age: 55
LOS: 1 days | Discharge: HOME | DRG: 621 | End: 2025-01-31
Attending: SURGERY | Admitting: SURGERY
Payer: COMMERCIAL

## 2025-01-30 ENCOUNTER — ANESTHESIA (OUTPATIENT)
Dept: SURGERY | Facility: HOSPITAL | Age: 55
DRG: 621 | End: 2025-01-30
Payer: COMMERCIAL

## 2025-01-30 DIAGNOSIS — E78.2 MIXED HYPERLIPIDEMIA: ICD-10-CM

## 2025-01-30 DIAGNOSIS — Z98.84 S/P BARIATRIC SURGERY: ICD-10-CM

## 2025-01-30 DIAGNOSIS — K44.9 HIATAL HERNIA WITH GERD WITHOUT ESOPHAGITIS: ICD-10-CM

## 2025-01-30 DIAGNOSIS — E66.813 CLASS 3 SEVERE OBESITY DUE TO EXCESS CALORIES WITH SERIOUS COMORBIDITY AND BODY MASS INDEX (BMI) OF 40.0 TO 44.9 IN ADULT: ICD-10-CM

## 2025-01-30 DIAGNOSIS — E66.9 OBESITY: Primary | ICD-10-CM

## 2025-01-30 DIAGNOSIS — E11.42 DIABETIC POLYNEUROPATHY ASSOCIATED WITH TYPE 2 DIABETES MELLITUS: ICD-10-CM

## 2025-01-30 DIAGNOSIS — M19.90 OSTEOARTHRITIS, UNSPECIFIED OSTEOARTHRITIS TYPE, UNSPECIFIED SITE: ICD-10-CM

## 2025-01-30 DIAGNOSIS — K21.9 HIATAL HERNIA WITH GERD WITHOUT ESOPHAGITIS: ICD-10-CM

## 2025-01-30 DIAGNOSIS — Z79.4 TYPE 2 DIABETES MELLITUS WITHOUT COMPLICATION, WITH LONG-TERM CURRENT USE OF INSULIN: ICD-10-CM

## 2025-01-30 DIAGNOSIS — G47.30 SLEEP APNEA, UNSPECIFIED TYPE: ICD-10-CM

## 2025-01-30 DIAGNOSIS — I10 ESSENTIAL HYPERTENSION: ICD-10-CM

## 2025-01-30 DIAGNOSIS — E11.9 TYPE 2 DIABETES MELLITUS WITHOUT COMPLICATION, WITH LONG-TERM CURRENT USE OF INSULIN: ICD-10-CM

## 2025-01-30 DIAGNOSIS — E66.01 CLASS 3 SEVERE OBESITY DUE TO EXCESS CALORIES WITH SERIOUS COMORBIDITY AND BODY MASS INDEX (BMI) OF 40.0 TO 44.9 IN ADULT: ICD-10-CM

## 2025-01-30 LAB
BASOPHILS # BLD AUTO: 0.04 K/UL (ref 0–0.2)
BASOPHILS NFR BLD: 0.3 % (ref 0–1.9)
CREAT SERPL-MCNC: 0.7 MG/DL (ref 0.5–1.4)
DIFFERENTIAL METHOD BLD: ABNORMAL
EOSINOPHIL # BLD AUTO: 0 K/UL (ref 0–0.5)
EOSINOPHIL NFR BLD: 0 % (ref 0–8)
ERYTHROCYTE [DISTWIDTH] IN BLOOD BY AUTOMATED COUNT: 14.1 % (ref 11.5–14.5)
EST. GFR  (NO RACE VARIABLE): >60 ML/MIN/1.73 M^2
HCT VFR BLD AUTO: 44.9 % (ref 37–48.5)
HGB BLD-MCNC: 14.4 G/DL (ref 12–16)
IMM GRANULOCYTES # BLD AUTO: 0.04 K/UL (ref 0–0.04)
IMM GRANULOCYTES NFR BLD AUTO: 0.3 % (ref 0–0.5)
LYMPHOCYTES # BLD AUTO: 0.9 K/UL (ref 1–4.8)
LYMPHOCYTES NFR BLD: 6.9 % (ref 18–48)
MCH RBC QN AUTO: 28.3 PG (ref 27–31)
MCHC RBC AUTO-ENTMCNC: 32.1 G/DL (ref 32–36)
MCV RBC AUTO: 88 FL (ref 82–98)
MONOCYTES # BLD AUTO: 0.9 K/UL (ref 0.3–1)
MONOCYTES NFR BLD: 7.3 % (ref 4–15)
NEUTROPHILS # BLD AUTO: 10.7 K/UL (ref 1.8–7.7)
NEUTROPHILS NFR BLD: 85.2 % (ref 38–73)
NRBC BLD-RTO: 0 /100 WBC
PLATELET # BLD AUTO: 177 K/UL (ref 150–450)
PMV BLD AUTO: 10.2 FL (ref 9.2–12.9)
POCT GLUCOSE: 211 MG/DL (ref 70–110)
POCT GLUCOSE: 233 MG/DL (ref 70–110)
POCT GLUCOSE: 243 MG/DL (ref 70–110)
RBC # BLD AUTO: 5.09 M/UL (ref 4–5.4)
WBC # BLD AUTO: 12.55 K/UL (ref 3.9–12.7)

## 2025-01-30 PROCEDURE — 71000033 HC RECOVERY, INTIAL HOUR: Performed by: SURGERY

## 2025-01-30 PROCEDURE — 63600175 PHARM REV CODE 636 W HCPCS: Performed by: SURGERY

## 2025-01-30 PROCEDURE — 36415 COLL VENOUS BLD VENIPUNCTURE: CPT | Performed by: SURGERY

## 2025-01-30 PROCEDURE — 27000221 HC OXYGEN, UP TO 24 HOURS

## 2025-01-30 PROCEDURE — 25000242 PHARM REV CODE 250 ALT 637 W/ HCPCS: Performed by: SURGERY

## 2025-01-30 PROCEDURE — 82962 GLUCOSE BLOOD TEST: CPT | Performed by: SURGERY

## 2025-01-30 PROCEDURE — 37000009 HC ANESTHESIA EA ADD 15 MINS: Performed by: SURGERY

## 2025-01-30 PROCEDURE — 36000711: Performed by: SURGERY

## 2025-01-30 PROCEDURE — 0DJ08ZZ INSPECTION OF UPPER INTESTINAL TRACT, VIA NATURAL OR ARTIFICIAL OPENING ENDOSCOPIC: ICD-10-PCS | Performed by: SURGERY

## 2025-01-30 PROCEDURE — 25000003 PHARM REV CODE 250: Performed by: SURGERY

## 2025-01-30 PROCEDURE — 63600175 PHARM REV CODE 636 W HCPCS: Mod: TB,JZ

## 2025-01-30 PROCEDURE — 82565 ASSAY OF CREATININE: CPT | Performed by: SURGERY

## 2025-01-30 PROCEDURE — 71000015 HC POSTOP RECOV 1ST HR: Performed by: SURGERY

## 2025-01-30 PROCEDURE — 36000710: Performed by: SURGERY

## 2025-01-30 PROCEDURE — 27201423 OPTIME MED/SURG SUP & DEVICES STERILE SUPPLY: Performed by: SURGERY

## 2025-01-30 PROCEDURE — 37000008 HC ANESTHESIA 1ST 15 MINUTES: Performed by: SURGERY

## 2025-01-30 PROCEDURE — 63600175 PHARM REV CODE 636 W HCPCS

## 2025-01-30 PROCEDURE — 25000003 PHARM REV CODE 250

## 2025-01-30 PROCEDURE — 85025 COMPLETE CBC W/AUTO DIFF WBC: CPT | Performed by: SURGERY

## 2025-01-30 PROCEDURE — 11000001 HC ACUTE MED/SURG PRIVATE ROOM

## 2025-01-30 PROCEDURE — 43644 LAP GASTRIC BYPASS/ROUX-EN-Y: CPT | Mod: COE,,, | Performed by: SURGERY

## 2025-01-30 PROCEDURE — 71000016 HC POSTOP RECOV ADDL HR: Performed by: SURGERY

## 2025-01-30 PROCEDURE — 0D164ZA BYPASS STOMACH TO JEJUNUM, PERCUTANEOUS ENDOSCOPIC APPROACH: ICD-10-PCS | Performed by: SURGERY

## 2025-01-30 PROCEDURE — 94761 N-INVAS EAR/PLS OXIMETRY MLT: CPT

## 2025-01-30 RX ORDER — HYDROMORPHONE HYDROCHLORIDE 1 MG/ML
INJECTION, SOLUTION INTRAMUSCULAR; INTRAVENOUS; SUBCUTANEOUS
Status: DISCONTINUED | OUTPATIENT
Start: 2025-01-30 | End: 2025-01-30

## 2025-01-30 RX ORDER — ONDANSETRON HYDROCHLORIDE 2 MG/ML
8 INJECTION, SOLUTION INTRAVENOUS EVERY 6 HOURS
Status: DISCONTINUED | OUTPATIENT
Start: 2025-01-30 | End: 2025-01-31 | Stop reason: HOSPADM

## 2025-01-30 RX ORDER — ROCURONIUM BROMIDE 10 MG/ML
INJECTION, SOLUTION INTRAVENOUS
Status: DISCONTINUED | OUTPATIENT
Start: 2025-01-30 | End: 2025-01-30

## 2025-01-30 RX ORDER — ONDANSETRON HYDROCHLORIDE 2 MG/ML
INJECTION, SOLUTION INTRAVENOUS
Status: DISCONTINUED | OUTPATIENT
Start: 2025-01-30 | End: 2025-01-30

## 2025-01-30 RX ORDER — LABETALOL HCL 20 MG/4 ML
10 SYRINGE (ML) INTRAVENOUS ONCE AS NEEDED
Status: COMPLETED | OUTPATIENT
Start: 2025-01-30 | End: 2025-01-30

## 2025-01-30 RX ORDER — DEXTROMETHORPHAN/PSEUDOEPHED 2.5-7.5/.8
40 DROPS ORAL 4 TIMES DAILY PRN
Status: DISCONTINUED | OUTPATIENT
Start: 2025-01-30 | End: 2025-01-31 | Stop reason: HOSPADM

## 2025-01-30 RX ORDER — FENTANYL CITRATE 50 UG/ML
INJECTION, SOLUTION INTRAMUSCULAR; INTRAVENOUS
Status: DISCONTINUED | OUTPATIENT
Start: 2025-01-30 | End: 2025-01-30

## 2025-01-30 RX ORDER — KETOROLAC TROMETHAMINE 15 MG/ML
15 INJECTION, SOLUTION INTRAMUSCULAR; INTRAVENOUS ONCE
Status: COMPLETED | OUTPATIENT
Start: 2025-01-30 | End: 2025-01-30

## 2025-01-30 RX ORDER — OXYCODONE HCL 5 MG/5 ML
5 SOLUTION, ORAL ORAL EVERY 6 HOURS PRN
Status: DISCONTINUED | OUTPATIENT
Start: 2025-01-30 | End: 2025-01-31 | Stop reason: HOSPADM

## 2025-01-30 RX ORDER — KETAMINE HCL IN 0.9 % NACL 50 MG/5 ML
SYRINGE (ML) INTRAVENOUS
Status: DISCONTINUED | OUTPATIENT
Start: 2025-01-30 | End: 2025-01-30

## 2025-01-30 RX ORDER — ACETAMINOPHEN 10 MG/ML
1000 INJECTION, SOLUTION INTRAVENOUS ONCE
Status: COMPLETED | OUTPATIENT
Start: 2025-01-30 | End: 2025-01-30

## 2025-01-30 RX ORDER — GABAPENTIN 250 MG/5ML
300 SOLUTION ORAL 2 TIMES DAILY
Status: DISCONTINUED | OUTPATIENT
Start: 2025-01-30 | End: 2025-01-31 | Stop reason: HOSPADM

## 2025-01-30 RX ORDER — GLUCAGON 1 MG
1 KIT INJECTION
Status: DISCONTINUED | OUTPATIENT
Start: 2025-01-30 | End: 2025-01-30 | Stop reason: HOSPADM

## 2025-01-30 RX ORDER — PROCHLORPERAZINE EDISYLATE 5 MG/ML
5 INJECTION INTRAMUSCULAR; INTRAVENOUS EVERY 6 HOURS PRN
Status: DISCONTINUED | OUTPATIENT
Start: 2025-01-30 | End: 2025-01-31 | Stop reason: HOSPADM

## 2025-01-30 RX ORDER — METHOCARBAMOL 100 MG/ML
1000 INJECTION, SOLUTION INTRAMUSCULAR; INTRAVENOUS ONCE
Status: COMPLETED | OUTPATIENT
Start: 2025-01-30 | End: 2025-01-30

## 2025-01-30 RX ORDER — METRONIDAZOLE 500 MG/100ML
500 INJECTION, SOLUTION INTRAVENOUS
Status: COMPLETED | OUTPATIENT
Start: 2025-01-30 | End: 2025-01-30

## 2025-01-30 RX ORDER — HYDROMORPHONE HYDROCHLORIDE 1 MG/ML
0.2 INJECTION, SOLUTION INTRAMUSCULAR; INTRAVENOUS; SUBCUTANEOUS EVERY 5 MIN PRN
Status: DISCONTINUED | OUTPATIENT
Start: 2025-01-30 | End: 2025-01-30

## 2025-01-30 RX ORDER — PANTOPRAZOLE SODIUM 40 MG/10ML
40 INJECTION, POWDER, LYOPHILIZED, FOR SOLUTION INTRAVENOUS 2 TIMES DAILY
Status: DISCONTINUED | OUTPATIENT
Start: 2025-01-30 | End: 2025-01-31 | Stop reason: HOSPADM

## 2025-01-30 RX ORDER — BUPIVACAINE HYDROCHLORIDE 2.5 MG/ML
INJECTION, SOLUTION EPIDURAL; INFILTRATION; INTRACAUDAL
Status: DISCONTINUED | OUTPATIENT
Start: 2025-01-30 | End: 2025-01-30 | Stop reason: HOSPADM

## 2025-01-30 RX ORDER — SODIUM CHLORIDE, SODIUM LACTATE, POTASSIUM CHLORIDE, CALCIUM CHLORIDE 600; 310; 30; 20 MG/100ML; MG/100ML; MG/100ML; MG/100ML
INJECTION, SOLUTION INTRAVENOUS CONTINUOUS
Status: DISCONTINUED | OUTPATIENT
Start: 2025-01-30 | End: 2025-01-31

## 2025-01-30 RX ORDER — SCOPOLAMINE 1 MG/3D
1 PATCH, EXTENDED RELEASE TRANSDERMAL ONCE
Status: DISCONTINUED | OUTPATIENT
Start: 2025-01-30 | End: 2025-01-31 | Stop reason: HOSPADM

## 2025-01-30 RX ORDER — FAMOTIDINE 10 MG/ML
20 INJECTION INTRAVENOUS ONCE
Status: COMPLETED | OUTPATIENT
Start: 2025-01-30 | End: 2025-01-30

## 2025-01-30 RX ORDER — SODIUM CHLORIDE 9 MG/ML
INJECTION, SOLUTION INTRAVENOUS CONTINUOUS
Status: DISCONTINUED | OUTPATIENT
Start: 2025-01-30 | End: 2025-01-31

## 2025-01-30 RX ORDER — ACETAMINOPHEN 650 MG/20.3ML
500 LIQUID ORAL
Status: DISCONTINUED | OUTPATIENT
Start: 2025-01-30 | End: 2025-01-31 | Stop reason: HOSPADM

## 2025-01-30 RX ORDER — MUPIROCIN 20 MG/G
OINTMENT TOPICAL
Status: DISCONTINUED | OUTPATIENT
Start: 2025-01-30 | End: 2025-01-30 | Stop reason: HOSPADM

## 2025-01-30 RX ORDER — HYDRALAZINE HYDROCHLORIDE 20 MG/ML
INJECTION INTRAMUSCULAR; INTRAVENOUS
Status: DISPENSED
Start: 2025-01-30 | End: 2025-01-31

## 2025-01-30 RX ORDER — HEPARIN SODIUM 5000 [USP'U]/ML
5000 INJECTION, SOLUTION INTRAVENOUS; SUBCUTANEOUS ONCE
Status: COMPLETED | OUTPATIENT
Start: 2025-01-30 | End: 2025-01-30

## 2025-01-30 RX ORDER — SODIUM CHLORIDE 0.9 % (FLUSH) 0.9 %
10 SYRINGE (ML) INJECTION
Status: DISCONTINUED | OUTPATIENT
Start: 2025-01-30 | End: 2025-01-30 | Stop reason: HOSPADM

## 2025-01-30 RX ORDER — PROPOFOL 10 MG/ML
VIAL (ML) INTRAVENOUS
Status: DISCONTINUED | OUTPATIENT
Start: 2025-01-30 | End: 2025-01-30

## 2025-01-30 RX ORDER — LIDOCAINE HYDROCHLORIDE 20 MG/ML
INJECTION, SOLUTION EPIDURAL; INFILTRATION; INTRACAUDAL; PERINEURAL
Status: DISCONTINUED | OUTPATIENT
Start: 2025-01-30 | End: 2025-01-30

## 2025-01-30 RX ORDER — INSULIN ASPART 100 [IU]/ML
0-10 INJECTION, SOLUTION INTRAVENOUS; SUBCUTANEOUS EVERY 6 HOURS PRN
Status: DISCONTINUED | OUTPATIENT
Start: 2025-01-30 | End: 2025-01-31 | Stop reason: HOSPADM

## 2025-01-30 RX ORDER — HALOPERIDOL 5 MG/ML
0.5 INJECTION INTRAMUSCULAR EVERY 10 MIN PRN
Status: DISCONTINUED | OUTPATIENT
Start: 2025-01-30 | End: 2025-01-30 | Stop reason: HOSPADM

## 2025-01-30 RX ORDER — GLUCAGON 1 MG
1 KIT INJECTION
Status: DISCONTINUED | OUTPATIENT
Start: 2025-01-30 | End: 2025-01-31 | Stop reason: HOSPADM

## 2025-01-30 RX ORDER — HYDRALAZINE HYDROCHLORIDE 20 MG/ML
10 INJECTION INTRAMUSCULAR; INTRAVENOUS EVERY 6 HOURS PRN
Status: COMPLETED | OUTPATIENT
Start: 2025-01-30 | End: 2025-01-30

## 2025-01-30 RX ORDER — ENOXAPARIN SODIUM 100 MG/ML
40 INJECTION SUBCUTANEOUS EVERY 12 HOURS
Status: DISCONTINUED | OUTPATIENT
Start: 2025-01-30 | End: 2025-01-31 | Stop reason: HOSPADM

## 2025-01-30 RX ORDER — LISINOPRIL 10 MG/1
20 TABLET ORAL DAILY
Status: COMPLETED | OUTPATIENT
Start: 2025-01-30 | End: 2025-01-30

## 2025-01-30 RX ORDER — MIDAZOLAM HYDROCHLORIDE 1 MG/ML
INJECTION, SOLUTION INTRAMUSCULAR; INTRAVENOUS
Status: DISCONTINUED | OUTPATIENT
Start: 2025-01-30 | End: 2025-01-30

## 2025-01-30 RX ORDER — LABETALOL HYDROCHLORIDE 5 MG/ML
INJECTION, SOLUTION INTRAVENOUS
Status: DISCONTINUED | OUTPATIENT
Start: 2025-01-30 | End: 2025-01-30

## 2025-01-30 RX ORDER — DEXMEDETOMIDINE HYDROCHLORIDE 100 UG/ML
INJECTION, SOLUTION INTRAVENOUS
Status: DISCONTINUED | OUTPATIENT
Start: 2025-01-30 | End: 2025-01-30

## 2025-01-30 RX ORDER — SUCCINYLCHOLINE CHLORIDE 20 MG/ML
INJECTION INTRAMUSCULAR; INTRAVENOUS
Status: DISCONTINUED | OUTPATIENT
Start: 2025-01-30 | End: 2025-01-30

## 2025-01-30 RX ORDER — DIPHENHYDRAMINE HYDROCHLORIDE 50 MG/ML
INJECTION INTRAMUSCULAR; INTRAVENOUS
Status: DISCONTINUED | OUTPATIENT
Start: 2025-01-30 | End: 2025-01-30

## 2025-01-30 RX ORDER — LIDOCAINE HYDROCHLORIDE 10 MG/ML
1 INJECTION, SOLUTION EPIDURAL; INFILTRATION; INTRACAUDAL; PERINEURAL ONCE
Status: DISCONTINUED | OUTPATIENT
Start: 2025-01-30 | End: 2025-01-30 | Stop reason: HOSPADM

## 2025-01-30 RX ORDER — HYDRALAZINE HYDROCHLORIDE 20 MG/ML
10 INJECTION INTRAMUSCULAR; INTRAVENOUS ONCE AS NEEDED
Status: ACTIVE | OUTPATIENT
Start: 2025-01-30 | End: 2025-01-30

## 2025-01-30 RX ORDER — LABETALOL HCL 20 MG/4 ML
5 SYRINGE (ML) INTRAVENOUS ONCE AS NEEDED
Status: COMPLETED | OUTPATIENT
Start: 2025-01-30 | End: 2025-01-30

## 2025-01-30 RX ORDER — ACETAMINOPHEN 650 MG/20.3ML
1000 LIQUID ORAL EVERY 8 HOURS
Status: DISCONTINUED | OUTPATIENT
Start: 2025-01-30 | End: 2025-01-31 | Stop reason: HOSPADM

## 2025-01-30 RX ADMIN — GABAPENTIN 300 MG: 250 SOLUTION ORAL at 08:01

## 2025-01-30 RX ADMIN — INSULIN ASPART 2 UNITS: 100 INJECTION, SOLUTION INTRAVENOUS; SUBCUTANEOUS at 02:01

## 2025-01-30 RX ADMIN — ACETAMINOPHEN 1000 MG: 10 INJECTION, SOLUTION INTRAVENOUS at 09:01

## 2025-01-30 RX ADMIN — SUCCINYLCHOLINE 200 MG: 20 INJECTION, SOLUTION INTRAMUSCULAR; INTRAVENOUS at 09:01

## 2025-01-30 RX ADMIN — HYDROMORPHONE HYDROCHLORIDE 0.2 MG: 1 INJECTION, SOLUTION INTRAMUSCULAR; INTRAVENOUS; SUBCUTANEOUS at 02:01

## 2025-01-30 RX ADMIN — ROCURONIUM BROMIDE 20 MG: 10 INJECTION, SOLUTION INTRAVENOUS at 11:01

## 2025-01-30 RX ADMIN — DEXMEDETOMIDINE 8 MCG: 100 INJECTION, SOLUTION, CONCENTRATE INTRAVENOUS at 01:01

## 2025-01-30 RX ADMIN — ONDANSETRON 4 MG: 2 INJECTION INTRAMUSCULAR; INTRAVENOUS at 01:01

## 2025-01-30 RX ADMIN — FENTANYL CITRATE 25 MCG: 50 INJECTION, SOLUTION INTRAMUSCULAR; INTRAVENOUS at 10:01

## 2025-01-30 RX ADMIN — DEXMEDETOMIDINE 8 MCG: 100 INJECTION, SOLUTION, CONCENTRATE INTRAVENOUS at 12:01

## 2025-01-30 RX ADMIN — SODIUM CHLORIDE 2 G: 9 INJECTION, SOLUTION INTRAVENOUS at 10:01

## 2025-01-30 RX ADMIN — HYDRALAZINE HYDROCHLORIDE 10 MG: 20 INJECTION, SOLUTION INTRAMUSCULAR; INTRAVENOUS at 02:01

## 2025-01-30 RX ADMIN — DEXMEDETOMIDINE 8 MCG: 100 INJECTION, SOLUTION, CONCENTRATE INTRAVENOUS at 10:01

## 2025-01-30 RX ADMIN — LISINOPRIL 20 MG: 10 TABLET ORAL at 03:01

## 2025-01-30 RX ADMIN — OXYCODONE HYDROCHLORIDE 5 MG: 5 SOLUTION ORAL at 02:01

## 2025-01-30 RX ADMIN — LABETALOL HYDROCHLORIDE 5 MG: 5 INJECTION, SOLUTION INTRAVENOUS at 03:01

## 2025-01-30 RX ADMIN — DEXMEDETOMIDINE 4 MCG: 100 INJECTION, SOLUTION, CONCENTRATE INTRAVENOUS at 10:01

## 2025-01-30 RX ADMIN — GLYCOPYRROLATE 0.1 MG: 0.2 INJECTION, SOLUTION INTRAMUSCULAR; INTRAVENOUS at 10:01

## 2025-01-30 RX ADMIN — LIDOCAINE HYDROCHLORIDE 100 MG: 20 INJECTION, SOLUTION EPIDURAL; INFILTRATION; INTRACAUDAL; PERINEURAL at 09:01

## 2025-01-30 RX ADMIN — LABETALOL HYDROCHLORIDE 10 MG: 5 INJECTION, SOLUTION INTRAVENOUS at 03:01

## 2025-01-30 RX ADMIN — Medication 15 MG: at 10:01

## 2025-01-30 RX ADMIN — METRONIDAZOLE 500 MG: 500 INJECTION, SOLUTION INTRAVENOUS at 10:01

## 2025-01-30 RX ADMIN — Medication 20 MG: at 09:01

## 2025-01-30 RX ADMIN — ROCURONIUM BROMIDE 10 MG: 10 INJECTION, SOLUTION INTRAVENOUS at 09:01

## 2025-01-30 RX ADMIN — SCOPOLAMINE 1 PATCH: 1.5 PATCH, EXTENDED RELEASE TRANSDERMAL at 09:01

## 2025-01-30 RX ADMIN — ROCURONIUM BROMIDE 10 MG: 10 INJECTION, SOLUTION INTRAVENOUS at 01:01

## 2025-01-30 RX ADMIN — ROCURONIUM BROMIDE 40 MG: 10 INJECTION, SOLUTION INTRAVENOUS at 10:01

## 2025-01-30 RX ADMIN — ONDANSETRON 4 MG: 2 INJECTION INTRAMUSCULAR; INTRAVENOUS at 10:01

## 2025-01-30 RX ADMIN — HYDROMORPHONE HYDROCHLORIDE 0.2 MG: 1 INJECTION, SOLUTION INTRAMUSCULAR; INTRAVENOUS; SUBCUTANEOUS at 11:01

## 2025-01-30 RX ADMIN — FAMOTIDINE 20 MG: 10 INJECTION, SOLUTION INTRAVENOUS at 09:01

## 2025-01-30 RX ADMIN — INSULIN ASPART 2 UNITS: 100 INJECTION, SOLUTION INTRAVENOUS; SUBCUTANEOUS at 10:01

## 2025-01-30 RX ADMIN — PROPOFOL 150 MG: 10 INJECTION, EMULSION INTRAVENOUS at 09:01

## 2025-01-30 RX ADMIN — HYDROMORPHONE HYDROCHLORIDE 0.2 MG: 1 INJECTION, SOLUTION INTRAMUSCULAR; INTRAVENOUS; SUBCUTANEOUS at 01:01

## 2025-01-30 RX ADMIN — SODIUM CHLORIDE: 0.9 INJECTION, SOLUTION INTRAVENOUS at 09:01

## 2025-01-30 RX ADMIN — GABAPENTIN 300 MG: 250 SOLUTION ORAL at 02:01

## 2025-01-30 RX ADMIN — PROPOFOL 150 MCG/KG/MIN: 10 INJECTION, EMULSION INTRAVENOUS at 10:01

## 2025-01-30 RX ADMIN — PANTOPRAZOLE SODIUM 40 MG: 40 INJECTION, POWDER, LYOPHILIZED, FOR SOLUTION INTRAVENOUS at 08:01

## 2025-01-30 RX ADMIN — LABETALOL HYDROCHLORIDE 5 MG: 5 INJECTION, SOLUTION INTRAVENOUS at 01:01

## 2025-01-30 RX ADMIN — OXYCODONE HYDROCHLORIDE 5 MG: 5 SOLUTION ORAL at 08:01

## 2025-01-30 RX ADMIN — ONDANSETRON 8 MG: 2 INJECTION INTRAMUSCULAR; INTRAVENOUS at 06:01

## 2025-01-30 RX ADMIN — SUGAMMADEX 200 MG: 100 INJECTION, SOLUTION INTRAVENOUS at 01:01

## 2025-01-30 RX ADMIN — SODIUM CHLORIDE, POTASSIUM CHLORIDE, SODIUM LACTATE AND CALCIUM CHLORIDE: 600; 310; 30; 20 INJECTION, SOLUTION INTRAVENOUS at 02:01

## 2025-01-30 RX ADMIN — KETOROLAC TROMETHAMINE 15 MG: 15 INJECTION, SOLUTION INTRAMUSCULAR; INTRAVENOUS at 09:01

## 2025-01-30 RX ADMIN — ACETAMINOPHEN 999.01 MG: 650 SOLUTION ORAL at 08:01

## 2025-01-30 RX ADMIN — SIMETHICONE 40 MG: 20 SUSPENSION/ DROPS ORAL at 08:01

## 2025-01-30 RX ADMIN — FENTANYL CITRATE 50 MCG: 50 INJECTION, SOLUTION INTRAMUSCULAR; INTRAVENOUS at 10:01

## 2025-01-30 RX ADMIN — METHOCARBAMOL 1000 MG: 1000 INJECTION, SOLUTION INTRAMUSCULAR; INTRAVENOUS at 04:01

## 2025-01-30 RX ADMIN — HALOPERIDOL LACTATE 0.5 MG: 5 INJECTION, SOLUTION INTRAMUSCULAR at 02:01

## 2025-01-30 RX ADMIN — MIDAZOLAM HYDROCHLORIDE 2 MG: 2 INJECTION, SOLUTION INTRAMUSCULAR; INTRAVENOUS at 09:01

## 2025-01-30 RX ADMIN — HEPARIN SODIUM 5000 UNITS: 5000 INJECTION INTRAVENOUS; SUBCUTANEOUS at 09:01

## 2025-01-30 RX ADMIN — MUPIROCIN: 20 OINTMENT TOPICAL at 09:01

## 2025-01-30 RX ADMIN — DIPHENHYDRAMINE HYDROCHLORIDE 12.5 MG: 50 INJECTION, SOLUTION INTRAMUSCULAR; INTRAVENOUS at 10:01

## 2025-01-30 RX ADMIN — ENOXAPARIN SODIUM 40 MG: 40 INJECTION SUBCUTANEOUS at 10:01

## 2025-01-30 NOTE — OP NOTE
DATE OF PROCEDURE: 1/30/2025    PRE OP DIAGNOSIS: BMI 40.0-44.9, adult [Z68.41]  Morbid obesity [E66.01]  Hypertension, unspecified type [I10]  Type 2 diabetes mellitus with hyperglycemia, without long-term current use of insulin [E11.65]  Hyperlipidemia  Obstructive sleep apnea  NAFLD  Osteoarthritis  GERD    POST OP DIAGNOSIS: BMI 40.0-44.9, adult [Z68.41]  Morbid obesity [E66.01]  Hypertension, unspecified type [I10]  Type 2 diabetes mellitus with hyperglycemia, without long-term current use of insulin [E11.65]  Hyperlipidemia  Obstructive sleep apnea  NAFLD  Osteoarthritis  GERD    PROCEDURE: Procedure(s) (LRB):  GASTROENTEROSTOMY, LAPAROSCOPIC W/ INTRAOP EGD (N/A)  BLOCK, TRANSVERSUS ABDOMINIS PLANE    Surgeons and Role:     * Gely Dawson MD - Primary     * Kristina Man MD - Resident - Assisting    ANESTHESIA: General    INDICATION: Patient is a 54 year-old morbidly obese female with BMI 40.59 kg/m² and multiple associated comorbidities including HTN, HLD, DM2 with polyneuropathy, CHATO, OA, NAFLD, and GERD. After discussing the risks, benefits and alternatives to bariatric surgery, she elected to proceed with laparoscopic olesya-en-y gastric bypass. Informed consent was obtained.     FINDINGS:  Antecolic-antegastric olesya-en-y gastroenterostomy with 40-cm BP limb and 150-cm olesya-limb  Negative gastrojejunostomy air-leak test under saline  No hiatal hernia     DESCRIPTION OF PROCEDURE: The patient was met in the pre-operative area and her identity and consent confirmed. She was then brought back to the Operating Room and placed in the supine position. General anesthesia was induced and she was intubated without incident. SCDs and a warming blanket were placed and pre-operative antibiotics were administered within 30 minutes of the incision. Her abdomen was prepped and draped in sterile fashion and a pre-procedural pause was performed by all members of the surgical and anesthesia teams. Access to the  abdomen was gained via 11-mm Optiview trocar approximately 18-cm below the xiphoid to the left of midline. Pneumoperitoneum to 15 mmHg was obtained and inspection yielded no injury. Bilateral transversus abdominal plane blocks were performed under direct vision instilling 10 mL 0.25% bupivacaine on each side.  Two RUQ 5-mm and two LUQ 5-mm ports were placed under direct vision as well as a 12-mm port to the right of midline. The liver retractor was placed via the right lateral trocar exposing the hiatus. An area on the lesser curve at the second bridging vein was identified and using the Harmonic scalpel, the gastrohepatic ligament was opened and the lesser sac was exposed. A blue load stapler was placed transversely across the stomach after ensuring there was nothing in the stomach from anesthesia perspective and fired. Two additional blue staple loads were fired towards the angle of His completing the pouch. Staple line bleeding was controlled with titanium hemoclips and Surgicel Snow. The transverse colon and omentum were elevated and the ligament of Treitz identified. The small bowel was transected 40 cm from the ligament of Treitz with a white staple load. A couple centimeters of mesentery were divided with the Harmonic to allow extra length on the Tera limb. The small bowel was run 150 cm distal to the transection. At this level the bowel was elevated and a small enterotomy was made on adjacent antimesenteric sides of the BP limb and distal jejunum. A white load stapler was placed in either limb of the small bowel and fired creating a jejunojejunostomy. The jejunojejunostomy was then elevated and the common enterotomy was closed with a 2-0 Vicryl suture in running 2-layer fashion with full-thickness inner layer and Lembert outer layer. We then turned our attention to the creation of the gastrojejunostomy. The proximal tera limb was brought up to the pouch ensuring there were no kinks or twists in the mesentery  and secured with two 2-0 Ticron stay-sutures. The gastrojejunostomy was then made in a similar fashion to the jejunojejunostomy on the posterior surface of the pouch using a 30-mm firing of the blue staple load. A 30Fr Bougie was advanced through the gastroenterotomy into the olesya limb. The common gastroenterotomy was closed over the Bougie with a 2-0 Vicryl suture in a 2-layer running fashion. EGD was performed and revealed an appropriately sized pouch and patent gastrojejunostomy. The upper abdomen was filled with saline to submerge the gastrojejunostomy and the proximal olesya-limb held closed with the bowel clamp. The bowel was insufflated such that it was distended and air escaped from the patient's mouth. Given the negative leak test, the intraluminal air was evacuated and the intraperitoneal saline aspirated. The jejunojejunostomy mesenteric defect was closed with a running 2-0 Ticron suture. The liver retractor was removed. The trocars were removed under direct visualization, allowing the abdomen to desufflate prior to removing the last. The skin incisions were closed with 4-0 Monocryl and reinforced with Dermabond. The patient was awoken from anesthesia and extubated without complication. She was brought to the PACU in good condition having tolerated the operation well.     EBL: 10 mL  Specimens: None  Drains: None  Complications: None apparent  Dispo: Surgical floor     Surgical sponge and needle count was correct at the end of the case. I was present and scrubbed for the entirety of the operation.     Gely Tatum  1/30/2025

## 2025-01-30 NOTE — ANESTHESIA POSTPROCEDURE EVALUATION
Anesthesia Post Evaluation    Patient: Gayatri Dela Cruz    Procedure(s) Performed: Procedure(s) (LRB):  GASTROENTEROSTOMY, LAPAROSCOPIC W/ INTRAOP EGD (N/A)  BLOCK, TRANSVERSUS ABDOMINIS PLANE    Final Anesthesia Type: general      Patient location during evaluation: PACU  Patient participation: Yes- Able to Participate  Level of consciousness: awake and alert  Post-procedure vital signs: reviewed and stable  Pain management: adequate  Airway patency: patent    PONV status at discharge: No PONV  Anesthetic complications: no      Cardiovascular status: blood pressure returned to baseline  Respiratory status: unassisted  Hydration status: euvolemic  Follow-up not needed.          Vitals Value Taken Time   /100 01/30/25 1457   Temp 36.2 °C (97.2 °F) 01/30/25 1408   Pulse 61 01/30/25 1457   Resp 22 01/30/25 1457   SpO2 90 % 01/30/25 1457   Vitals shown include unfiled device data.      Event Time   Out of Recovery 14:15:00         Pain/Amina Score: Pain Rating Prior to Med Admin: 8 (1/30/2025  2:42 PM)  Amina Score: 9 (1/30/2025  2:15 PM)

## 2025-01-30 NOTE — BRIEF OP NOTE
[facility] - Surgery ([facility])  Brief Operative Note    SUMMARY     Surgery Date: 1/30/2025     Surgeons and Role:     * Gely Dawson MD - Primary     * Kristina Man MD - Resident - Assisting        Pre-op Diagnosis:  Type 2 diabetes mellitus with hyperglycemia, without long-term current use of insulin [E11.65]  BMI 40.0-44.9, adult [Z68.41]  Morbid obesity [E66.01]  Hypertension, unspecified type [I10]  Sleep apnea, unspecified type [G47.30]    Post-op Diagnosis:  Post-Op Diagnosis Codes:     * Type 2 diabetes mellitus with hyperglycemia, without long-term current use of insulin [E11.65]     * BMI 40.0-44.9, adult [Z68.41]     * Morbid obesity [E66.01]     * Hypertension, unspecified type [I10]     * Sleep apnea, unspecified type [G47.30]    Procedure(s) (LRB):  GASTROENTEROSTOMY, LAPAROSCOPIC W/ INTRAOP EGD (N/A)  BLOCK, TRANSVERSUS ABDOMINIS PLANE    Anesthesia: General/Regional    Implants:  * No implants in log *    Operative Findings: Laparoscopic RNYGB without intraoperative complication.     Estimated Blood Loss: * No values recorded between 1/30/2025 10:29 AM and 1/30/2025  2:02 PM *    Estimated Blood Loss has been documented.         Specimens:   Specimen (24h ago, onward)      None            [id number]

## 2025-01-30 NOTE — TRANSFER OF CARE
"Anesthesia Transfer of Care Note    Patient: Gayatri Dela Cruz    Procedure(s) Performed: Procedure(s) (LRB):  GASTROENTEROSTOMY, LAPAROSCOPIC W/ INTRAOP EGD (N/A)  BLOCK, TRANSVERSUS ABDOMINIS PLANE    Patient location: PACU    Anesthesia Type: general    Transport from OR: Transported from OR on 6-10 L/min O2 by face mask with adequate spontaneous ventilation    Post pain: adequate analgesia    Post assessment: no apparent anesthetic complications and tolerated procedure well    Post vital signs: stable    Level of consciousness: sedated    Nausea/Vomiting: no nausea/vomiting    Complications: none    Transfer of care protocol was followed      Last vitals: Visit Vitals  /65   Pulse 69   Temp 36.5 °C (97.7 °F)   Resp 18   Ht 5' 6.5" (1.689 m)   Wt 114.9 kg (253 lb 4.9 oz)   LMP 06/01/2016   SpO2 97%   Breastfeeding No   BMI 40.27 kg/m²     "

## 2025-01-30 NOTE — ANESTHESIA PREPROCEDURE EVALUATION
[facility]  Anesthesia Pre-Operative Evaluation         Patient Name: Gayatri Dela Cruz  YOB: 1970  MRN: [id number]    SUBJECTIVE:     Pre-operative evaluation for Procedure(s) (LRB):  GASTROENTEROSTOMY, LAPAROSCOPIC W/ INTRAOP EGD (N/A)     01/30/2025    Gayatri Dela Cruz is a 54 y.o. female w/ a significant PMHx of T2DM with polyneuropathy, HTN, hiatal hernia with GERD, CHATO, and MO.    Patient now presents for the above procedure(s).      LDA: None documented.       Prev airway: None documented.    Drips:    0.9% NaCl   Intravenous Continuous           Patient Active Problem List   Diagnosis    Type 2 diabetes mellitus with hyperglycemia, without long-term current use of insulin    Diabetic polyneuropathy associated with type 2 diabetes mellitus    Essential hypertension    Mixed hyperlipidemia    Carpal tunnel syndrome of right wrist    Decreased libido without sexual dysfunction    Hiatal hernia with GERD without esophagitis    Lung nodules    BMI 40.0-44.9, adult    Fracture    Sleep apnea    Varicose vein of leg    Edema    Fatty liver    Family history of pulmonary embolism       Review of patient's allergies indicates:  No Known Allergies    Current Inpatient Medications:   acetaminophen  1,000 mg Intravenous Once    famotidine (PF)  20 mg Intravenous Once    heparin (porcine)  5,000 Units Subcutaneous Once    ketorolac  15 mg Intravenous Once    LIDOcaine (PF) 10 mg/ml (1%)  1 mL Intradermal Once    scopolamine  1 patch Transdermal Once       No current facility-administered medications on file prior to encounter.     Current Outpatient Medications on File Prior to Encounter   Medication Sig Dispense Refill    acetaminophen (TYLENOL) 500 MG tablet Take 1,000 mg by mouth once daily.      aspirin (ECOTRIN) 81 MG EC tablet Take 81 mg by mouth once daily.      cyanocobalamin (VITAMIN B-12) 100 MCG tablet Take 5,000 mcg by mouth. 2 Times in a week      fish  oil-omega-3 fatty acids 300-1,000 mg capsule Take by mouth 2 (two) times daily.      gabapentin (NEURONTIN) 400 MG capsule Take 2 capsules (800 mg total) by mouth every evening. 60 capsule 11    JANUVIA 50 mg Tab 50 mg once daily.      JARDIANCE 10 mg tablet Take 10 mg by mouth once daily.      lisinopriL (PRINIVIL,ZESTRIL) 40 MG tablet Take 40 mg by mouth once daily.      melatonin 10 mg Chew Take by mouth. Nightly      metFORMIN (GLUCOPHAGE) 1000 MG tablet Take 1,000 mg by mouth 2 (two) times daily. Taking time release      multivitamin (THERAGRAN) per tablet Take 1 tablet by mouth once daily.      pioglitazone (ACTOS) 30 MG tablet Take 30 mg by mouth once daily.      rosuvastatin (CRESTOR) 20 MG tablet Take 20 mg by mouth once daily.         Past Surgical History:   Procedure Laterality Date    LAPAROSCOPY-ASSISTED VAGINAL HYSTERECTOMY  2019    robotic       Social History     Socioeconomic History    Marital status:    Tobacco Use    Smoking status: Former     Current packs/day: 0.00     Average packs/day: 0.7 packs/day for 43.2 years (29.1 ttl pk-yrs)     Types: Cigarettes     Start date: 1988     Quit date: 3/20/2016     Years since quittin.8     Passive exposure: Past    Smokeless tobacco: Never    Tobacco comments:     Quit tobacco smoking in    Substance and Sexual Activity    Alcohol use: Not Currently     Comment: social    Drug use: Not Currently     Types: Marijuana     Comment: rarely    Sexual activity: Yes     Partners: Male     Birth control/protection: Post-menopausal, See Surgical Hx, None     Social Drivers of Health     Financial Resource Strain: Low Risk  (2024)    Overall Financial Resource Strain (CARDIA)     Difficulty of Paying Living Expenses: Not hard at all   Food Insecurity: No Food Insecurity (2024)    Hunger Vital Sign     Worried About Running Out of Food in the Last Year: Never true     Ran Out of Food in the Last Year: Never true   Transportation  Needs: No Transportation Needs (6/3/2024)    Received from [facility] - Transportation     Lack of Transportation (Medical): No     Lack of Transportation (Non-Medical): No   Physical Activity: Sufficiently Active (11/1/2024)    Exercise Vital Sign     Days of Exercise per Week: 6 days     Minutes of Exercise per Session: 150+ min   Stress: No Stress Concern Present (11/1/2024)    [employer] - Occupational Stress Questionnaire     Feeling of Stress : Not at all   Housing Stability: Unknown (11/1/2024)    Housing Stability Vital Sign     Unable to Pay for Housing in the Last Year: No       OBJECTIVE:     Vital Signs Range (Last 24H):  Pulse:  [69-75]   Resp:  [20]   BP: (134)/(78)   SpO2:  [99 %]       Significant Labs:  Lab Results   Component Value Date    WBC 6.84 01/08/2025    HGB 15.3 01/08/2025    HCT 46.6 01/08/2025     01/08/2025    CHOL 125 01/08/2025    TRIG 189 (H) 01/08/2025    HDL 35 (L) 01/08/2025    ALT 28 01/08/2025    AST 21 01/08/2025     01/08/2025    K 4.1 01/08/2025     01/08/2025    CREATININE 0.8 01/08/2025    BUN 17 01/08/2025    CO2 25 01/08/2025    TSH 1.156 01/08/2025    INR 1.0 11/11/2024    HGBA1C 8.5 (H) 01/08/2025       Diagnostic Studies: No relevant studies.    EKG:   Results for orders placed or performed during the hospital encounter of 01/08/25   EKG    Collection Time: 01/08/25  7:47 AM   Result Value Ref Range    QRS Duration 78 ms    OHS QTC Calculation 420 ms    Narrative    Test Reason : E66.01,Z68.41,I10,E78.5,K21.9,G47.30,M19.90,E11.9,Z79.4,    Vent. Rate :  67 BPM     Atrial Rate :  67 BPM     P-R Int : 166 ms          QRS Dur :  78 ms      QT Int : 398 ms       P-R-T Axes :  60 -32  42 degrees    QTcB Int : 420 ms    Normal sinus rhythm  Left axis deviation  Low voltage QRS  Cannot rule out Inferior infarct (cited on or before 10-Trevon-2016)  Abnormal R wave progression  Cannot rule out Anterior infarct (cited on  or before 10-Trevon-2016)  Abnormal ECG  When compared with ECG of 10-Trevon-2016 16:08,  The axis Shifted left  Confirmed by Altaf Salinas ([id number]) on 1/8/2025 8:01:36 AM    Referred By: FUNMI CANTRELL           Confirmed By: Altaf Salinas       2D ECHO:  TTE:  No results found for this or any previous visit.    KARLEY:  No results found for this or any previous visit.    ASSESSMENT/PLAN:                                                                                                        01/30/2025  Gayatri Dela Cruz is a 54 y.o., female.      Pre-op Assessment    I have reviewed the Patient Summary Reports.     I have reviewed the Nursing Notes. I have reviewed the NPO Status.   I have reviewed the Medications.     Review of Systems  Anesthesia Hx:   History of prior surgery of interest to airway management or planning:          Denies Family Hx of Anesthesia complications.    Denies Personal Hx of Anesthesia complications.                    Cardiovascular:     Hypertension                                    Hypertension         Pulmonary:        Sleep Apnea     Obstructive Sleep Apnea (CHATO).           Hepatic/GI:    Hiatal Hernia, GERD Liver Disease,        Gerd    Hernia, Hiatal Hernia   Liver Disease        Neurological:    Neuromuscular Disease,                                 Neuromuscular Disease   Endocrine:  Diabetes, poorly controlled, type 2    Diabetes                    Morbid Obesity / BMI > 40      Physical Exam  General: Well nourished, Cooperative, Alert and Oriented    Airway:  Mallampati: III   Mouth Opening: Normal  TM Distance: Normal  Tongue: Normal  Neck ROM: Normal ROM    Dental:  Intact        Anesthesia Plan  Type of Anesthesia, risks & benefits discussed:    Anesthesia Type: Gen ETT  Intra-op Monitoring Plan: Standard ASA Monitors  Post Op Pain Control Plan: multimodal analgesia  Induction:  IV  Airway Plan: Direct, Post-Induction  Informed Consent: Informed consent signed with the  Patient and all parties understand the risks and agree with anesthesia plan.  All questions answered.   ASA Score: 3  Day of Surgery Review of History & Physical: H&P Update referred to the surgeon/provider.    Ready For Surgery From Anesthesia Perspective.     .

## 2025-01-30 NOTE — PROGRESS NOTES
MD Man bedside to assess patient, current vitals are 182/94 with map of 131, heart rate 67, on 3L NC. States would like BP systolic at 170 prior to going to the floor.

## 2025-01-30 NOTE — PROGRESS NOTES
Anesthesia resident notified of continued elevated blood pressure and parametes per Magda BENOIT. States to give PRN labetalol

## 2025-01-30 NOTE — ANESTHESIA PROCEDURE NOTES
Intubation    Date/Time: 1/30/2025 10:01 AM    Performed by: Iman Bates CRNA  Authorized by: Janet Bhatti MD    Intubation:     Induction:  Intravenous    Intubated:  Postinduction    Mask Ventilation:  Easy mask    Attempts:  1    Attempted By:  CRNA    Method of Intubation:  Video laryngoscopy    Blade:  Hernandez 3    Laryngeal View Grade: Grade I - full view of cords      Difficult Airway Encountered?: No      Complications:  None    Airway Device:  Oral endotracheal tube    Style/Cuff Inflation:  Cuffed    Secured at:  The lips    Placement Verified By:  Capnometry    Complicating Factors:  None    Findings Post-Intubation:  BS equal bilateral and atraumatic/condition of teeth unchanged

## 2025-01-31 VITALS
OXYGEN SATURATION: 95 % | SYSTOLIC BLOOD PRESSURE: 96 MMHG | HEIGHT: 67 IN | BODY MASS INDEX: 39.76 KG/M2 | RESPIRATION RATE: 18 BRPM | WEIGHT: 253.31 LBS | HEART RATE: 72 BPM | DIASTOLIC BLOOD PRESSURE: 52 MMHG | TEMPERATURE: 98 F

## 2025-01-31 LAB
ANION GAP SERPL CALC-SCNC: 9 MMOL/L (ref 8–16)
BASOPHILS # BLD AUTO: 0.04 K/UL (ref 0–0.2)
BASOPHILS NFR BLD: 0.5 % (ref 0–1.9)
BUN SERPL-MCNC: 7 MG/DL (ref 6–20)
CALCIUM SERPL-MCNC: 8.7 MG/DL (ref 8.7–10.5)
CHLORIDE SERPL-SCNC: 108 MMOL/L (ref 95–110)
CO2 SERPL-SCNC: 23 MMOL/L (ref 23–29)
CREAT SERPL-MCNC: 0.7 MG/DL (ref 0.5–1.4)
DIFFERENTIAL METHOD BLD: NORMAL
EOSINOPHIL # BLD AUTO: 0 K/UL (ref 0–0.5)
EOSINOPHIL NFR BLD: 0.3 % (ref 0–8)
ERYTHROCYTE [DISTWIDTH] IN BLOOD BY AUTOMATED COUNT: 14.3 % (ref 11.5–14.5)
EST. GFR  (NO RACE VARIABLE): >60 ML/MIN/1.73 M^2
GLUCOSE SERPL-MCNC: 171 MG/DL (ref 70–110)
HCT VFR BLD AUTO: 41.9 % (ref 37–48.5)
HGB BLD-MCNC: 13.6 G/DL (ref 12–16)
IMM GRANULOCYTES # BLD AUTO: 0.02 K/UL (ref 0–0.04)
IMM GRANULOCYTES NFR BLD AUTO: 0.3 % (ref 0–0.5)
LYMPHOCYTES # BLD AUTO: 1.7 K/UL (ref 1–4.8)
LYMPHOCYTES NFR BLD: 22.2 % (ref 18–48)
MAGNESIUM SERPL-MCNC: 1.6 MG/DL (ref 1.6–2.6)
MCH RBC QN AUTO: 28.5 PG (ref 27–31)
MCHC RBC AUTO-ENTMCNC: 32.5 G/DL (ref 32–36)
MCV RBC AUTO: 88 FL (ref 82–98)
MONOCYTES # BLD AUTO: 0.9 K/UL (ref 0.3–1)
MONOCYTES NFR BLD: 11.6 % (ref 4–15)
NEUTROPHILS # BLD AUTO: 5.1 K/UL (ref 1.8–7.7)
NEUTROPHILS NFR BLD: 65.1 % (ref 38–73)
NRBC BLD-RTO: 0 /100 WBC
PHOSPHATE SERPL-MCNC: 3.6 MG/DL (ref 2.7–4.5)
PLATELET # BLD AUTO: 196 K/UL (ref 150–450)
PMV BLD AUTO: 10.9 FL (ref 9.2–12.9)
POCT GLUCOSE: 188 MG/DL (ref 70–110)
POTASSIUM SERPL-SCNC: 3.6 MMOL/L (ref 3.5–5.1)
RBC # BLD AUTO: 4.78 M/UL (ref 4–5.4)
SODIUM SERPL-SCNC: 140 MMOL/L (ref 136–145)
WBC # BLD AUTO: 7.75 K/UL (ref 3.9–12.7)

## 2025-01-31 PROCEDURE — 84100 ASSAY OF PHOSPHORUS: CPT | Performed by: SURGERY

## 2025-01-31 PROCEDURE — 25000003 PHARM REV CODE 250: Performed by: SURGERY

## 2025-01-31 PROCEDURE — 85025 COMPLETE CBC W/AUTO DIFF WBC: CPT | Performed by: SURGERY

## 2025-01-31 PROCEDURE — 63600175 PHARM REV CODE 636 W HCPCS: Performed by: SURGERY

## 2025-01-31 PROCEDURE — 25000242 PHARM REV CODE 250 ALT 637 W/ HCPCS: Performed by: SURGERY

## 2025-01-31 PROCEDURE — 83735 ASSAY OF MAGNESIUM: CPT | Performed by: SURGERY

## 2025-01-31 PROCEDURE — 36415 COLL VENOUS BLD VENIPUNCTURE: CPT | Performed by: SURGERY

## 2025-01-31 PROCEDURE — 80048 BASIC METABOLIC PNL TOTAL CA: CPT | Performed by: SURGERY

## 2025-01-31 RX ORDER — OXYCODONE HCL 5 MG/5 ML
5 SOLUTION, ORAL ORAL EVERY 6 HOURS PRN
Qty: 25 ML | Refills: 0 | Status: SHIPPED | OUTPATIENT
Start: 2025-01-31 | End: 2025-02-05

## 2025-01-31 RX ORDER — URSODIOL 500 MG/1
500 TABLET, FILM COATED ORAL DAILY
Qty: 14 TABLET | Refills: 0 | Status: SHIPPED | OUTPATIENT
Start: 2025-01-31 | End: 2025-02-14

## 2025-01-31 RX ADMIN — SIMETHICONE 40 MG: 20 SUSPENSION/ DROPS ORAL at 05:01

## 2025-01-31 RX ADMIN — ACETAMINOPHEN 999.01 MG: 650 SOLUTION ORAL at 05:01

## 2025-01-31 RX ADMIN — GABAPENTIN 300 MG: 250 SOLUTION ORAL at 09:01

## 2025-01-31 RX ADMIN — PANTOPRAZOLE SODIUM 40 MG: 40 INJECTION, POWDER, LYOPHILIZED, FOR SOLUTION INTRAVENOUS at 09:01

## 2025-01-31 RX ADMIN — OXYCODONE HYDROCHLORIDE 5 MG: 5 SOLUTION ORAL at 10:01

## 2025-01-31 RX ADMIN — ACETAMINOPHEN 999.01 MG: 650 SOLUTION ORAL at 02:01

## 2025-01-31 RX ADMIN — ONDANSETRON 8 MG: 2 INJECTION INTRAMUSCULAR; INTRAVENOUS at 05:01

## 2025-01-31 RX ADMIN — INSULIN ASPART 2 UNITS: 100 INJECTION, SOLUTION INTRAVENOUS; SUBCUTANEOUS at 07:01

## 2025-01-31 RX ADMIN — ONDANSETRON 8 MG: 2 INJECTION INTRAMUSCULAR; INTRAVENOUS at 12:01

## 2025-01-31 RX ADMIN — ENOXAPARIN SODIUM 40 MG: 40 INJECTION SUBCUTANEOUS at 09:01

## 2025-01-31 NOTE — PROGRESS NOTES
..Nursing Transfer Note      Reason patient is being transferred: procedure care complete     Transfer To: 511    Transfer via bed    Transfer with n/a    Transported by PCT     Medicines sent: lactated ringers [id number] mL/hr  :  Intravenous  :  Continuous  :      Any special needs or follow-up needed: routine    Chart send with patient: Yes    Notified: spouse    Patient reassessed at: 6558 1/30/25 (date, time)

## 2025-01-31 NOTE — PLAN OF CARE
Problem: Adult Inpatient Plan of Care  Goal: Plan of Care Review  Outcome: Progressing  Goal: Patient-Specific Goal (Individualized)  Outcome: Progressing  Goal: Absence of Hospital-Acquired Illness or Injury  Outcome: Progressing     Problem: Adult Inpatient Plan of Care  Goal: Plan of Care Review  Outcome: Progressing     Problem: Adult Inpatient Plan of Care  Goal: Patient-Specific Goal (Individualized)  Outcome: Progressing     Problem: Adult Inpatient Plan of Care  Goal: Absence of Hospital-Acquired Illness or Injury  Outcome: Progressing     Problem: Bariatric Environmental Safety  Goal: Safety Maintained with Care  Outcome: Progressing    PRN medication effective. For pain Explained plan of care, verbalized understanding. Educated pt .on new medicine . No injury during shift, Side rails up x 2, call light by bedside.  Ambulated in room and in hallway, completed  water protocol  no increased signs of nausea

## 2025-01-31 NOTE — PLAN OF CARE
[facility]  Initial Discharge Assessment       Primary Care Provider: Chelsie Temple MD    Admission Diagnosis: Type 2 diabetes mellitus with hyperglycemia, without long-term current use of insulin [E11.65]  BMI 40.0-44.9, adult [Z68.41]  Morbid obesity [E66.01]  Hypertension, unspecified type [I10]  Sleep apnea, unspecified type [G47.30]  S/P bariatric surgery [Z98.84]  Obesity [E66.9]    Admission Date: 1/30/2025  Expected Discharge Date: 1/31/2025         Payor: [employer] [facility] / Plan: BCBS ALL OUT OF STATE / Product Type: PPO /     Extended Emergency Contact Information  Primary Emergency Contact: Farhan Dela Cruz  Address: [address] [place] [address] [place] [address] [facility]  Home Phone: [phone]  Mobile Phone: [phone]  Relation: Spouse    Discharge Plan A: Home with family         [facility] - [place] - [address]  [address]  [place] [address]  Phone: [phone] Fax: [phone]    [id number] - [place] - [address] AT [facility] [address]  [address]  [place] [address]  Phone: [phone] Fax: [phone]    [id number] - [place] - [address]  [address]  [place] [address]  Phone: [phone] Fax: [phone]      Initial Assessment (most recent)       Adult Discharge Assessment - 01/31/25 1100          Discharge Assessment    Assessment Type Discharge Planning Assessment     Confirmed/corrected address, phone number and insurance Yes     Confirmed Demographics Correct on Facesheet     Source of Information patient     Communicated RODOLFO with patient/caregiver Yes     People in Home child(lacey), adult;spouse     Facility Arrived From: House     Do you expect to return to your current living situation? Yes     Do you have help at home or someone to help you manage your care at home? Yes     Who are your caregiver(s) and their phone number(s)? Farhan Dela Cruz  (Spouse) [phone]     Prior to hospitilization cognitive status: Alert/Oriented     Current cognitive status: Alert/Oriented     Walking or Climbing Stairs Difficulty no     Dressing/Bathing Difficulty no     Equipment Currently Used at Home none     Readmission within 30 days? No     Patient currently being followed by outpatient case management? No     Do you currently have service(s) that help you manage your care at home? No     Do you take prescription medications? Yes     Do you have prescription coverage? Yes     Do you have any problems affording any of your prescribed medications? No     Is the patient taking medications as prescribed? yes     Who is going to help you get home at discharge? Spouse-Kylie     How do you get to doctors appointments? family or friend will provide     Are you on dialysis? No     Do you take coumadin? No     Discharge Plan A Home with family        Physical Activity    On average, how many days per week do you engage in moderate to strenuous exercise (like a brisk walk)? 0 days     On average, how many minutes do you engage in exercise at this level? 0 min        Financial Resource Strain    How hard is it for you to pay for the very basics like food, housing, medical care, and heating? Not hard at all        Housing Stability    In the last 12 months, was there a time when you were not able to pay the mortgage or rent on time? No     At any time in the past 12 months, were you homeless or living in a shelter (including now)? No        Transportation Needs    Has the lack of transportation kept you from medical appointments, meetings, work or from getting things needed for daily living? No        Food Insecurity    Within the past 12 months, you worried that your food would run out before you got the money to buy more. Never true     Within the past 12 months, the food you bought just didn't last and you didn't have money to get more. Never true        Stress    Do you feel  stress - tense, restless, nervous, or anxious, or unable to sleep at night because your mind is troubled all the time - these days? Not at all        Social Isolation    How often do you feel lonely or isolated from those around you?  Never        Alcohol Use    Q1: How often do you have a drink containing alcohol? Never     Q2: How many drinks containing alcohol do you have on a typical day when you are drinking? Patient does not drink     Q3: How often do you have six or more drinks on one occasion? Never        Utilities    In the past 12 months has the electric, gas, oil, or water company threatened to shut off services in your home? No        Health Literacy    How often do you need to have someone help you when you read instructions, pamphlets, or other written material from your doctor or pharmacy? Never                   Spoke with patient at bedside to complete d/c planning assessment. Patient lives with her spouse and adult son. Home is two-story without steps to enter. Patient's room is on the first floor of home. Independent with [employer]. No DME. Verified PCP, Pharmacy and health insurance. Patient will have help at home from spouse. Anticipate d/c later this afternoon. Will have help at home from spouse. Discharge Plan A and Plan B have been determined by review of patient's clinical status, future medical and therapeutic needs, and coverage/benefits for post-acute care in coordination with multidisciplinary team members.      Cathie HOLGUIN  Case Management  [facility]  [phone]

## 2025-01-31 NOTE — DISCHARGE SUMMARY
[facility]  General Surgery  Discharge Summary      Patient Name: Ryan Stokes  MRN: [id number]  Admission Date: 1/30/2025  Hospital Length of Stay: 1 days  Discharge Date and Time:  01/31/2025 12:12 PM  Attending Physician: Gely Dawson   Discharging Provider: Kristina Man MD  Primary Care Provider: Chelsie Temple MD     HPI: Ryan Stokes is a 54 year-old morbidly obese female with BMI 40.59 kg/m² and multiple associated comorbidities including HTN, HLD, IDDM2 with polyneuropathy, CHATO, OA, NAFLD, and hiatal hernia with GERD, who presents for pre-operative exam for weight loss surgery. She has failed multiple attempts at non-surgical methods of weight loss and has completed the [facility] program which she started on 1/8/25 at which time her BMI was 42.97 kg/m². She meets NIH consensus criteria for bariatric surgery and is interested in undergoing laparoscopic olesya-en-y gastric bypass. Her medical history is also significant for stage 1B endometrial cancer s/p robotic total hysterectomy with BSO 4/1/19 requiring post-op blood transfusion and adjuvant vaginal brachytherapy completed 6/2019     Procedure(s) (LRB):  GASTROENTEROSTOMY, LAPAROSCOPIC W/ INTRAOP EGD (N/A)  BLOCK, TRANSVERSUS ABDOMINIS PLANE     Hospital Course:   Patient was admitted to the lap/sander surgery service. she was made NPO, given IVF, as well as pain and nausea control. Started on antibiotics. RYAN STOKES 54 y.o.female underwent: Procedure(s) (LRB):  GASTROENTEROSTOMY, LAPAROSCOPIC W/ INTRAOP EGD (N/A)  BLOCK, TRANSVERSUS ABDOMINIS PLANE. The patient tolerated the procedure well, was transferred to recovery post-op, and then transferred to the [facility] for continuation of medical care. The patient's clinical condition progressively improved. Had ROBF. Patient was HDS throughout admission. By the time of discharge, she was meeting all post op milestones, tolerating a liquid  diet without nausea or vomiting, pain was well controlled with oral medications, and she was ambulating without difficulty. Voiding appropriately. On POD 1 the patient was discharged to home. On discharge, the patient's incisions were c/d/i and the surgical site was soft and appropriately tender to palpation. The patient will follow up in Dr. Tatum's clinic in 2 weeks. Discussed POC and ED precautions with patient. Patient verbalized understanding and is agreeable to plan. All questions answered.    Please see hospital and op notes for further detail regarding patient's admission.    Patient's discharge was discussed with Dr. Tatum.       Indwelling Lines/Drains at time of discharge:   Lines/Drains/Airways       None                   Significant Diagnostic Studies: Labs: BMP:   Recent Labs   Lab 01/30/25 1925 01/31/25  0415   GLU  --  171*   NA  --  140   K  --  3.6   CL  --  108   CO2  --  23   BUN  --  7   CREATININE 0.7 0.7   CALCIUM  --  8.7   MG  --  1.6    and CBC   Recent Labs   Lab 01/30/25 1925 01/31/25  0415   WBC 12.55 7.75   HGB 14.4 13.6   HCT 44.9 41.9    196       Pending Diagnostic Studies:       None            Final Active Diagnoses:    Diagnosis Date Noted POA    PRINCIPAL PROBLEM:  Obesity [E66.9] 01/30/2025 Yes      Problems Resolved During this Admission:        Discharged Condition: good    Disposition: Home or Self Care    Follow Up:   Follow-up Information       Gely Dawson MD Follow up in 2 week(s).    Specialties: General Surgery, Bariatrics  Contact information:  [facility] [id number] [phone]                             Patient Instructions:      Diet Bariatric High Protein Clear Liquid   Order Comments: No soft drinks     Lifting restrictions (nothing greater than 10lbs)   Scheduling Instructions: Lifting restrictions:  nothing greater than 10lbs     No driving until:   Order Comments: No driving until off narcotic pain medication.  Can  turn around without pain off narcotics.  At least 1 week.     Notify your health care provider if you experience any of the following:   Order Comments: temperature > 100.4, persistent nausea and vomiting or diarrhea, severe uncontrolled pain, redness, tenderness, or signs of infection (pain, swelling, redness, odor or green/yellow discharge around incision site), difficulty breathing or increased cough, severe persistent headache, worsening rash, persistent dizziness, light-headedness, or visual disturbances, increased confusion or weakness     Activity as tolerated     Shower on day two and no bath       Medications:  Reconciled Home Medications:      Medication List        START taking these medications      oxyCODONE 5 mg/5 mL Soln  Commonly known as: ROXICODONE  Take 5 mLs (5 mg total) by mouth every 6 (six) hours as needed.     ursodioL 500 MG tablet  Commonly known as: ZANE FORTE  Take 1 tablet (500 mg total) by mouth once daily. for 14 days            CHANGE how you take these medications      TYLENOL EXTRA STRENGTH 500 mg Pwpk  Generic drug: acetaminophen  Take 1g (2 packets) by mouth every 8 hours for at least 3 days and up to 5 days as needed. May take one additional 500mg dose (1 packet) per day for breakthrough pain. Do not exceed 4g in 24 hours.  What changed: Another medication with the same name was removed. Continue taking this medication, and follow the directions you see here.            CONTINUE taking these medications      aspirin 81 MG EC tablet  Commonly known as: ECOTRIN  Take 81 mg by mouth once daily.     cyanocobalamin 100 MCG tablet  Commonly known as: VITAMIN B-12  Take 5,000 mcg by mouth. 2 Times in a week     fish oil-omega-3 fatty acids 300-1,000 mg capsule  Take by mouth 2 (two) times daily.     * gabapentin 400 MG capsule  Commonly known as: NEURONTIN  Take 2 capsules (800 mg total) by mouth every evening.     * gabapentin 400 MG capsule  Commonly known as: NEURONTIN  Take 2 caps 2  times daily. Open capsule and empty prior to taking. Take one dose on morning of surgery prior to arrival. Take 2 caps twice a day for 3 days post-op. Continue for an additional 2 days as needed x 5 days     JANUVIA 50 mg Tab  Generic drug: SITagliptin phosphate  50 mg once daily.     JARDIANCE 10 mg tablet  Generic drug: empagliflozin  Take 10 mg by mouth once daily.     lisinopriL 40 MG tablet  Commonly known as: PRINIVIL,ZESTRIL  Take 40 mg by mouth once daily.     melatonin 10 mg Chew  Take by mouth. Nightly     metFORMIN 1000 MG tablet  Commonly known as: GLUCOPHAGE  Take 1,000 mg by mouth 2 (two) times daily. Taking time release     multivitamin per tablet  Commonly known as: THERAGRAN  Take 1 tablet by mouth once daily.     omeprazole 40 MG capsule  Commonly known as: PRILOSEC  Take 1 capsule (40 mg total) by mouth every morning. Open capsule and sprinkle granules for 2 weeks post-op     ondansetron 8 MG Tbdl  Commonly known as: ZOFRAN-ODT  DISSOLVE 1 tablet (8 mg total) by mouth every 6 (six) hours as needed (nausea).     pioglitazone 30 MG tablet  Commonly known as: ACTOS  Take 30 mg by mouth once daily.     rosuvastatin 20 MG tablet  Commonly known as: CRESTOR  Take 20 mg by mouth once daily.           * This list has 2 medication(s) that are the same as other medications prescribed for you. Read the directions carefully, and ask your doctor or other care provider to review them with you.                  Kristina Man MD           Patient was seen and examined on the date of discharge and determined to be suitable for discharge.  Total time spent preparing discharge services: 20 minutes.  Time was spent speaking with consultants and case management, reviewing records, and/or discussing the plan of care with patient/family.    Kristina Man MD  General Surgery   PGY-3

## 2025-01-31 NOTE — PROGRESS NOTES
Report called to Bert Ronquillo RN notified of patient request of hot packs to back. Hot Packs applied to back and in place in bed.

## 2025-01-31 NOTE — SUBJECTIVE & OBJECTIVE
Interval History: POD 1 s/p Tera en Y gastric bypass. Patient seen and examined this AM. Tolerated water protocol ON; denies N/V. Pain well controlled with MMPC.     Medications:  Continuous Infusions:   0.9% NaCl   Intravenous Continuous   New Bag at 01/30/25 0953    lactated ringers   Intravenous Continuous 125 mL/hr at 01/30/25 1417 New Bag at 01/30/25 1417     Scheduled Meds:   acetaminophen  999.0148 mg Oral Q8H    enoxparin  40 mg Subcutaneous Q12H    gabapentin  300 mg Oral BID    ondansetron  8 mg Intravenous Q6H    pantoprazole  40 mg Intravenous BID    scopolamine  1 patch Transdermal Once     PRN Meds:  Current Facility-Administered Medications:     acetaminophen, 499.5074 mg, Oral, Q24H PRN    dextrose 50%, 12.5 g, Intravenous, PRN    glucagon (human recombinant), 1 mg, Intramuscular, PRN    insulin aspart U-100, 0-10 Units, Subcutaneous, Q6H PRN    oxyCODONE, 5 mg, Oral, Q6H PRN    prochlorperazine, 5 mg, Intravenous, Q6H PRN    simethicone, 40 mg, Oral, QID PRN     Review of patient's allergies indicates:  No Known Allergies  Objective:     Vital Signs (Most Recent):  Temp: 98.3 °F (36.8 °C) (01/31/25 0505)  Pulse: 74 (01/31/25 0505)  Resp: 16 (01/31/25 0505)  BP: (!) 116/59 (01/31/25 0505)  SpO2: (!) 93 % (01/31/25 0505) Vital Signs (24h Range):  Temp:  [97.2 °F (36.2 °C)-98.3 °F (36.8 °C)] 98.3 °F (36.8 °C)  Pulse:  [60-86] 74  Resp:  [12-20] 16  SpO2:  [93 %-100 %] 93 %  BP: (101-210)/() 116/59     Weight: 114.9 kg (253 lb 4.9 oz)  Body mass index is 40.27 kg/m².    Intake/Output - Last 3 Shifts         01/29 0700 01/30 0659 01/30 0700 01/31 0659 01/31 0700  02/01 0659    P.O.  250     I.V. (mL/kg)  100 (0.9)     IV Piggyback  100     Total Intake(mL/kg)  450 (3.9)     Net  +450            Urine Occurrence  3 x              Physical Exam  Vitals reviewed.   Constitutional:       Appearance: Normal appearance. She is obese. She is not ill-appearing.   HENT:      Head: Normocephalic and  atraumatic.   Eyes:      Extraocular Movements: Extraocular movements intact.   Cardiovascular:      Rate and Rhythm: Normal rate.      Pulses: Normal pulses.   Pulmonary:      Effort: Pulmonary effort is normal. No respiratory distress.   Abdominal:      Palpations: Abdomen is soft.      Tenderness: There is no guarding or rebound.      Comments: Appropriately TTP. Incisions c/d/I with overlying dermabond   Musculoskeletal:         General: Normal range of motion.      Cervical back: Normal range of motion.   Skin:     General: Skin is warm.      Capillary Refill: Capillary refill takes less than 2 seconds.   Neurological:      General: No focal deficit present.      Mental Status: She is alert and oriented to person, place, and time.   Psychiatric:         Mood and Affect: Mood normal.          Significant Labs:  I have reviewed all pertinent lab results within the past 24 hours.  CBC:   Recent Labs   Lab 01/31/25  0415   WBC 7.75   RBC 4.78   HGB 13.6   HCT 41.9      MCV 88   MCH 28.5   MCHC 32.5     BMP:   Recent Labs   Lab 01/31/25  0415   *      K 3.6      CO2 23   BUN 7   CREATININE 0.7   CALCIUM 8.7   MG 1.6       Significant Diagnostics:  I have reviewed all pertinent imaging results/findings within the past 24 hours.

## 2025-01-31 NOTE — NURSING
Patient arrived to room 511 via bed,  present at bedside, VS taken and documented, admission documentation completed, SCD's applied, instructed on IS use, educated pt on water protocol, oriented to room and equipment, allowed time for questions, all questions answered, no further concerns voiced at this time, safety measures in place, call light within reach, instructed to call with any needs, verbalized understanding, continue current plan of care.

## 2025-01-31 NOTE — PROGRESS NOTES
[facility]  General Surgery  Progress Note    Subjective:     History of Present Illness:  No notes on file    Post-Op Info:  Procedure(s) (LRB):  GASTROENTEROSTOMY, LAPAROSCOPIC W/ INTRAOP EGD (N/A)  BLOCK, TRANSVERSUS ABDOMINIS PLANE   1 Day Post-Op     Interval History: POD 1 s/p Tera en Y gastric bypass. Patient seen and examined this AM. Tolerated water protocol ON; denies N/V. Pain well controlled with MMPC.     Medications:  Continuous Infusions:   0.9% NaCl   Intravenous Continuous   New Bag at 01/30/25 0953    lactated ringers   Intravenous Continuous 125 mL/hr at 01/30/25 1417 New Bag at 01/30/25 1417     Scheduled Meds:   acetaminophen  999.0148 mg Oral Q8H    enoxparin  40 mg Subcutaneous Q12H    gabapentin  300 mg Oral BID    ondansetron  8 mg Intravenous Q6H    pantoprazole  40 mg Intravenous BID    scopolamine  1 patch Transdermal Once     PRN Meds:  Current Facility-Administered Medications:     acetaminophen, 499.5074 mg, Oral, Q24H PRN    dextrose 50%, 12.5 g, Intravenous, PRN    glucagon (human recombinant), 1 mg, Intramuscular, PRN    insulin aspart U-100, 0-10 Units, Subcutaneous, Q6H PRN    oxyCODONE, 5 mg, Oral, Q6H PRN    prochlorperazine, 5 mg, Intravenous, Q6H PRN    simethicone, 40 mg, Oral, QID PRN     Review of patient's allergies indicates:  No Known Allergies  Objective:     Vital Signs (Most Recent):  Temp: 98.3 °F (36.8 °C) (01/31/25 0505)  Pulse: 74 (01/31/25 0505)  Resp: 16 (01/31/25 0505)  BP: (!) 116/59 (01/31/25 0505)  SpO2: (!) 93 % (01/31/25 0505) Vital Signs (24h Range):  Temp:  [97.2 °F (36.2 °C)-98.3 °F (36.8 °C)] 98.3 °F (36.8 °C)  Pulse:  [60-86] 74  Resp:  [12-20] 16  SpO2:  [93 %-100 %] 93 %  BP: (101-210)/() 116/59     Weight: 114.9 kg (253 lb 4.9 oz)  Body mass index is 40.27 kg/m².    Intake/Output - Last 3 Shifts         01/29 0700 01/30 0659 01/30 0700 01/31 0659 01/31 0700  02/01 0659    P.O.  250     I.V. (mL/kg)  100 (0.9)     IV Piggyback  100      Total Intake(mL/kg)  450 (3.9)     Net  +450            Urine Occurrence  3 x              Physical Exam  Vitals reviewed.   Constitutional:       Appearance: Normal appearance. She is obese. She is not ill-appearing.   HENT:      Head: Normocephalic and atraumatic.   Eyes:      Extraocular Movements: Extraocular movements intact.   Cardiovascular:      Rate and Rhythm: Normal rate.      Pulses: Normal pulses.   Pulmonary:      Effort: Pulmonary effort is normal. No respiratory distress.   Abdominal:      Palpations: Abdomen is soft.      Tenderness: There is no guarding or rebound.      Comments: Appropriately TTP. Incisions c/d/I with overlying dermabond   Musculoskeletal:         General: Normal range of motion.      Cervical back: Normal range of motion.   Skin:     General: Skin is warm.      Capillary Refill: Capillary refill takes less than 2 seconds.   Neurological:      General: No focal deficit present.      Mental Status: She is alert and oriented to person, place, and time.   Psychiatric:         Mood and Affect: Mood normal.          Significant Labs:  I have reviewed all pertinent lab results within the past 24 hours.  CBC:   Recent Labs   Lab 01/31/25  0415   WBC 7.75   RBC 4.78   HGB 13.6   HCT 41.9      MCV 88   MCH 28.5   MCHC 32.5     BMP:   Recent Labs   Lab 01/31/25  0415   *      K 3.6      CO2 23   BUN 7   CREATININE 0.7   CALCIUM 8.7   MG 1.6       Significant Diagnostics:  I have reviewed all pertinent imaging results/findings within the past 24 hours.  Assessment/Plan:     * Obesity  Gayatri Dela Cruz is a 54 y.o. F w/ PMHx including morbid obesity refractory to medical management. Now s/p Tera en Y gastric bypass with Dr. Tatum on 1/30/25      - Tolerated water protocol, continue CLD   - DC mIVF it tolerating CLD  - All meds to be in elixer form, <5mm, or crushed  - Mike tylenol, gabapentin, PRN oxycodone  - PRN anti-emetics  - OOB, IS, ambulate  - Home  meds reviewed and reconciled.   - GI ppx: protonix  - DVT ppx: lovenox BID    Dispo: Once tolerating diet and adequate pain control            Tisha Ruth MD  General Surgery  [facility]

## 2025-01-31 NOTE — NURSING
Pt ambulated hallway with spouse   no increase signs of distress post walk  0030 pt ambulated to bathroom to void , IV removed inadvertently

## 2025-02-03 ENCOUNTER — TELEPHONE (OUTPATIENT)
Dept: BARIATRICS | Facility: CLINIC | Age: 55
End: 2025-02-03
Payer: COMMERCIAL

## 2025-02-03 NOTE — TELEPHONE ENCOUNTER
NAUSEA/VOMITING none  BS 92   GAS IN SHOULDERS Y  FEVERS N  PROBLEMS URINATING N  TACHYCARDIA N B/P   BM 2/2 SMALL LOOSE ONE TODAY MORE FORMED  SITES X6 CDI   S/S OF INFECTION- N  TOLERATING FLUIDS  Y      WATER  50        PROTEIN 30  ENCOURAGE 30G-40G/DAYPROTEIN AND 48 OZ /DAY WATER   Increase to 64 ounces /day and 60 grams of protein by 1 week post op appt.   VITAMINS - start DAY OF POST OP APPT. WITH DIET. dont take mvi w/ iron w/ ca or vit d wait 2 hours to take. You may take it with your B complexes  PAIN 0/10 scale where POSITIONAL AND GAS  AMBULATING Y  SHOWER 2/2  REINFORCED NO TUB BATHS FOR 2 WEEKS  Opiods remaining in bottle in ml's 5   1 WEEK NONE  INSTRUCTED ON S/S OF INFECTION  REITERATED TO CALL ME ON MY CELL [phone] IF ANYTHING OUT OF THE ORDINARY OCCURS AT NIGHT OR DURING THE DAY AFTER D/C TO HOME /[facility]  INSTRUCTED PT TO BRING PCP AGREEMENT W/ THEM TO THEIR POST OP APPTS. SO THE PCP CAN SEE WHAT LABS NEED TO BE DRAWN AND TO GET THEM TO FAX US THE RESULTS AND THE OV NOTES FOR EACH VISIT

## 2025-02-05 ENCOUNTER — OFFICE VISIT (OUTPATIENT)
Dept: BARIATRICS | Facility: CLINIC | Age: 55
End: 2025-02-05
Payer: COMMERCIAL

## 2025-02-05 ENCOUNTER — CLINICAL SUPPORT (OUTPATIENT)
Dept: BARIATRICS | Facility: CLINIC | Age: 55
End: 2025-02-05
Payer: COMMERCIAL

## 2025-02-05 VITALS
BODY MASS INDEX: 40.52 KG/M2 | HEART RATE: 66 BPM | DIASTOLIC BLOOD PRESSURE: 71 MMHG | SYSTOLIC BLOOD PRESSURE: 130 MMHG | OXYGEN SATURATION: 98 % | WEIGHT: 254.88 LBS

## 2025-02-05 DIAGNOSIS — G47.30 SLEEP APNEA, UNSPECIFIED TYPE: ICD-10-CM

## 2025-02-05 DIAGNOSIS — Z98.84 S/P BARIATRIC SURGERY: Primary | ICD-10-CM

## 2025-02-05 DIAGNOSIS — K21.9 GASTROESOPHAGEAL REFLUX DISEASE WITHOUT ESOPHAGITIS: ICD-10-CM

## 2025-02-05 DIAGNOSIS — E11.42 DIABETIC POLYNEUROPATHY ASSOCIATED WITH TYPE 2 DIABETES MELLITUS: ICD-10-CM

## 2025-02-05 DIAGNOSIS — Z98.84 S/P BARIATRIC SURGERY: ICD-10-CM

## 2025-02-05 DIAGNOSIS — Z71.3 DIETARY COUNSELING AND SURVEILLANCE: Primary | ICD-10-CM

## 2025-02-05 DIAGNOSIS — E11.9 TYPE 2 DIABETES MELLITUS WITHOUT COMPLICATION, WITH LONG-TERM CURRENT USE OF INSULIN: ICD-10-CM

## 2025-02-05 DIAGNOSIS — E66.01 CLASS 3 SEVERE OBESITY DUE TO EXCESS CALORIES WITH SERIOUS COMORBIDITY AND BODY MASS INDEX (BMI) OF 40.0 TO 44.9 IN ADULT: ICD-10-CM

## 2025-02-05 DIAGNOSIS — Z79.4 TYPE 2 DIABETES MELLITUS WITHOUT COMPLICATION, WITH LONG-TERM CURRENT USE OF INSULIN: ICD-10-CM

## 2025-02-05 DIAGNOSIS — M19.90 OSTEOARTHRITIS, UNSPECIFIED OSTEOARTHRITIS TYPE, UNSPECIFIED SITE: ICD-10-CM

## 2025-02-05 DIAGNOSIS — K76.0 FATTY LIVER: ICD-10-CM

## 2025-02-05 DIAGNOSIS — I10 ESSENTIAL HYPERTENSION: ICD-10-CM

## 2025-02-05 DIAGNOSIS — E66.813 CLASS 3 SEVERE OBESITY DUE TO EXCESS CALORIES WITH SERIOUS COMORBIDITY AND BODY MASS INDEX (BMI) OF 40.0 TO 44.9 IN ADULT: ICD-10-CM

## 2025-02-05 DIAGNOSIS — E78.2 MIXED HYPERLIPIDEMIA: ICD-10-CM

## 2025-02-05 PROCEDURE — 99999 PR PBB SHADOW E&M-EST. PATIENT-LVL I: CPT | Mod: PBBFAC,COE,, | Performed by: DIETITIAN, REGISTERED

## 2025-02-05 PROCEDURE — 99024 POSTOP FOLLOW-UP VISIT: CPT | Mod: S$GLB,COE,, | Performed by: SURGERY

## 2025-02-05 PROCEDURE — 99999 PR PBB SHADOW E&M-EST. PATIENT-LVL III: CPT | Mod: PBBFAC,COE,, | Performed by: SURGERY

## 2025-02-05 NOTE — PROGRESS NOTES
NUTRITION NOTE    Referring Physician: Gely Tatum M.D.   Reason for MNT Referral: Follow-up 1 Week s/p laparoscopic Tera-en-Y gastric bypass    PAST MEDICAL HISTORY:  Denies nausea, vomiting, constipation, and diarrhea.  Reports doing well.    Past Medical History:   Diagnosis Date    Arthritis 2021    COVID-19     Dyslipidemia     Encounter for blood transfusion 03/2019    Endometrial cancer 03/19/2019    Essential (primary) hypertension     Hiatal hernia 10/04/2022    Hyperlipidemia 2019    Morbid obesity with BMI of 45.0-49.9, adult     Simple ovarian cyst 03/19/2019    Type 2 diabetes mellitus with diabetic polyneuropathy        CLINICAL DATA:  54 y.o.-year-old White female.    Initial wt: 270 lbs  Pre-op weight: 254 lbs  Current Weight: 254 lbs  BMI: 40.52    CURRENT DIET:  Bariatric Liquid Diet    Diet Recall: 90 grams of protein/day; 78 oz of fluids/day    Diet Includes:  Protein Supplements: Premier Protein, SixStar, protein water  Fluids include: Water, broth, SF Jell-O    LABS:  Reviewed.    VITAMINS/MINERALS:  Flingstones Multivitamin with 18 mg iron  Calcium Citrate 630 mg with vitamin D, 2 pills 2 x day   B-1 250 mg, 1 per week  B-12 sublingual 5000 mcg every 2 week     EXERCISE:  Walking     ASSESSMENT:  Doing well overall.  Adequate protein intake.  Adequate fluid intake.    BARIATRIC DIET DISCUSSION:  Instructed and provided written materials on bariatric puree, soft and solid diet plans   Bariatric soft diet plan to start in 3 weeks as tolerated  Bariatric solid diet plan to start in 7 weeks as tolerated  Pt may swallow tablets and pills at 2 week post op   Reinforced post-op nutrition guidelines.    PLAN/RECOMMONDATIONS:  Continue on full liquid diet  Advance to bariatric puree diet in one week  Maintain protein intake.  Maintain fluid intake.  Continue light exercise.  Begin appropriate vitamins & minerals.    SESSION TIME: 30 minutes

## 2025-02-05 NOTE — PROGRESS NOTES
BARIATRIC FOLLOW UP:    Chief Complaint   Patient presents with    Post-op Evaluation     RYGB 1/30/25     HISTORY OF PRESENT ILLNESS: Gayatri Dela Cruz is a 54 year-old morbidly obese woman with current BMI 40.52 kg/m² who presents for 1-week post-op follow up s/p laparoscopic olesya-en-y gastric bypass 1/30/25. She is overall doing well and tolerating the liquid diet without pain, nausea, vomiting, retching, or heartburn. She had shoulder/anterior chest gas pains initially which have since resolved. Her bowels were initially liquids but are more pasty and regular now. She is walking daily. She has itching of her incisions. She denies fevers, chills, dysphagia, regurgitation, abdominal pain, chest pain, shortness of breath, diarrhea, constipation, or dysuria. She has lost 16 lbs, approximately 12.7% of her excess weight.     Pre-op weight: 270 lbs  Ideal BW: 144 lbs  Excess weight: 126 lbs  1-week post-op: 254 lbs    Review of Systems   Constitutional:  Negative for chills, fever and malaise/fatigue.   HENT: Negative.     Eyes: Negative.    Respiratory: Negative.     Cardiovascular: Negative.    Gastrointestinal:  Negative for abdominal pain, constipation, diarrhea, heartburn, nausea and vomiting.   Genitourinary: Negative.    Musculoskeletal: Negative.    Skin: Negative.    Neurological: Negative.    Endo/Heme/Allergies: Negative.    Psychiatric/Behavioral: Negative.       EXERCISE:  Walking daily     DIET & VITAMINS:  Excellent daily water intake   g protein/day  See RD assessment for additional details     MEDICATIONS/ALLERGIES:  Reviewed and updated in EMR    Vitals:    02/05/25 0809   BP: 130/71   Pulse: 66     Physical Exam  Vitals reviewed.   Constitutional:       General: She is not in acute distress.     Appearance: Normal appearance. She is well-developed. She is obese. She is not ill-appearing.   HENT:      Head: Normocephalic and atraumatic.   Eyes:      General: No scleral icterus.      Conjunctiva/sclera: Conjunctivae normal.   Cardiovascular:      Rate and Rhythm: Normal rate and regular rhythm.   Pulmonary:      Effort: Pulmonary effort is normal. No respiratory distress.   Abdominal:      General: There is no distension.      Palpations: Abdomen is soft.      Tenderness: There is no abdominal tenderness. There is no guarding.   Musculoskeletal:         General: Normal range of motion.      Cervical back: Normal range of motion and neck supple.   Skin:     General: Skin is warm and dry.      Findings: Rash (reactive erythema under Dermabond c/w topical allergy) present.   Neurological:      General: No focal deficit present.      Mental Status: She is alert and oriented to person, place, and time.   Psychiatric:         Mood and Affect: Mood normal.         Behavior: Behavior normal.       ASSESSMENT:  - Class 3 morbid obesity with current body mass index 40.52 kg/m² s/p laparoscopic olesya-en-y gastric bypass 1/30/25  - Estimated goal weight: 198 lbs which corresponds to 50% EWL  - Co-morbidities: HTN, HLD, DM2 with polyneuropathy, CHATO, OA, NAFLD, and GERD   - Weight loss of 16 lbs, approximately 12.7% EWL  - Tolerating liquid diet  - Not at risk for fall or abuse     PLAN:  - Emphasized the importance of regular exercise and adherence to bariatric diet to achieve maximum weight loss.  - Encouraged patient to continue regular exercise and increase as able over the upcoming weeks.  - Follow-up with dietician today to advance diet and review post-bariatric surgery vitamin regimen.  - Continue antacid medication omeprazole for 12 weeks post-op.  - Start ursodiol in 1 week and take for 6 months.  - May take medications whole at 2 weeks post-op.  - Transition routine follow-up to local health care team / PCP with annual labs including thiamine (B1).  - Call the office for any issues or desired follow-up.     Gely Tatum  2/5/2025

## 2025-02-10 DIAGNOSIS — S82.64XA NONDISPLACED FRACTURE OF LATERAL MALLEOLUS OF RIGHT FIBULA, INITIAL ENCOUNTER FOR CLOSED FRACTURE: Primary | ICD-10-CM

## 2025-02-13 ENCOUNTER — LAB VISIT (OUTPATIENT)
Dept: LAB | Facility: HOSPITAL | Age: 55
End: 2025-02-13
Attending: PHYSICIAN ASSISTANT
Payer: COMMERCIAL

## 2025-02-13 ENCOUNTER — OFFICE VISIT (OUTPATIENT)
Dept: FAMILY MEDICINE | Facility: CLINIC | Age: 55
End: 2025-02-13
Payer: COMMERCIAL

## 2025-02-13 ENCOUNTER — TELEPHONE (OUTPATIENT)
Dept: FAMILY MEDICINE | Facility: CLINIC | Age: 55
End: 2025-02-13
Payer: COMMERCIAL

## 2025-02-13 VITALS
HEIGHT: 67 IN | SYSTOLIC BLOOD PRESSURE: 122 MMHG | BODY MASS INDEX: 38.92 KG/M2 | DIASTOLIC BLOOD PRESSURE: 70 MMHG | RESPIRATION RATE: 18 BRPM | WEIGHT: 248 LBS | OXYGEN SATURATION: 98 % | HEART RATE: 73 BPM

## 2025-02-13 DIAGNOSIS — I10 ESSENTIAL HYPERTENSION: ICD-10-CM

## 2025-02-13 DIAGNOSIS — Z98.84 STATUS POST BARIATRIC SURGERY: ICD-10-CM

## 2025-02-13 DIAGNOSIS — Z98.84 STATUS POST BARIATRIC SURGERY: Primary | ICD-10-CM

## 2025-02-13 LAB
ALBUMIN SERPL BCP-MCNC: 3.8 G/DL (ref 3.5–5.2)
ALP SERPL-CCNC: 75 U/L (ref 40–150)
ALT SERPL W/O P-5'-P-CCNC: 19 U/L (ref 10–44)
ANION GAP SERPL CALC-SCNC: 16 MMOL/L (ref 8–16)
AST SERPL-CCNC: 30 U/L (ref 10–40)
BASOPHILS # BLD AUTO: 0.06 K/UL (ref 0–0.2)
BASOPHILS NFR BLD: 0.7 % (ref 0–1.9)
BILIRUB SERPL-MCNC: 0.4 MG/DL (ref 0.1–1)
BUN SERPL-MCNC: 14 MG/DL (ref 6–20)
CALCIUM SERPL-MCNC: 9.4 MG/DL (ref 8.7–10.5)
CHLORIDE SERPL-SCNC: 103 MMOL/L (ref 95–110)
CO2 SERPL-SCNC: 21 MMOL/L (ref 23–29)
CREAT SERPL-MCNC: 0.7 MG/DL (ref 0.5–1.4)
DIFFERENTIAL METHOD BLD: ABNORMAL
EOSINOPHIL # BLD AUTO: 0.2 K/UL (ref 0–0.5)
EOSINOPHIL NFR BLD: 1.9 % (ref 0–8)
ERYTHROCYTE [DISTWIDTH] IN BLOOD BY AUTOMATED COUNT: 15.1 % (ref 11.5–14.5)
EST. GFR  (NO RACE VARIABLE): >60 ML/MIN/1.73 M^2
GLUCOSE SERPL-MCNC: 114 MG/DL (ref 70–110)
HCT VFR BLD AUTO: 43.6 % (ref 37–48.5)
HGB BLD-MCNC: 14.3 G/DL (ref 12–16)
IMM GRANULOCYTES # BLD AUTO: 0.01 K/UL (ref 0–0.04)
IMM GRANULOCYTES NFR BLD AUTO: 0.1 % (ref 0–0.5)
LYMPHOCYTES # BLD AUTO: 1.5 K/UL (ref 1–4.8)
LYMPHOCYTES NFR BLD: 18 % (ref 18–48)
MCH RBC QN AUTO: 28.4 PG (ref 27–31)
MCHC RBC AUTO-ENTMCNC: 32.8 G/DL (ref 32–36)
MCV RBC AUTO: 87 FL (ref 82–98)
MONOCYTES # BLD AUTO: 0.8 K/UL (ref 0.3–1)
MONOCYTES NFR BLD: 9.3 % (ref 4–15)
NEUTROPHILS # BLD AUTO: 5.7 K/UL (ref 1.8–7.7)
NEUTROPHILS NFR BLD: 70 % (ref 38–73)
NRBC BLD-RTO: 0 /100 WBC
PLATELET # BLD AUTO: 256 K/UL (ref 150–450)
PMV BLD AUTO: 11.3 FL (ref 9.2–12.9)
POTASSIUM SERPL-SCNC: 4.1 MMOL/L (ref 3.5–5.1)
PROT SERPL-MCNC: 6.9 G/DL (ref 6–8.4)
RBC # BLD AUTO: 5.04 M/UL (ref 4–5.4)
SODIUM SERPL-SCNC: 140 MMOL/L (ref 136–145)
VIT B12 SERPL-MCNC: 827 PG/ML (ref 210–950)
WBC # BLD AUTO: 8.07 K/UL (ref 3.9–12.7)

## 2025-02-13 PROCEDURE — 80053 COMPREHEN METABOLIC PANEL: CPT | Performed by: PHYSICIAN ASSISTANT

## 2025-02-13 PROCEDURE — 82607 VITAMIN B-12: CPT | Performed by: PHYSICIAN ASSISTANT

## 2025-02-13 PROCEDURE — 85025 COMPLETE CBC W/AUTO DIFF WBC: CPT | Performed by: PHYSICIAN ASSISTANT

## 2025-02-13 PROCEDURE — 36415 COLL VENOUS BLD VENIPUNCTURE: CPT | Mod: PO | Performed by: PHYSICIAN ASSISTANT

## 2025-02-13 PROCEDURE — 84425 ASSAY OF VITAMIN B-1: CPT | Performed by: PHYSICIAN ASSISTANT

## 2025-02-13 PROCEDURE — 99999 PR PBB SHADOW E&M-EST. PATIENT-LVL V: CPT | Mod: PBBFAC,COE,, | Performed by: PHYSICIAN ASSISTANT

## 2025-02-13 RX ORDER — URSODIOL 500 MG/1
500 TABLET, FILM COATED ORAL DAILY
Qty: 14 TABLET | Refills: 0 | Status: CANCELLED | OUTPATIENT
Start: 2025-02-13 | End: 2025-02-27

## 2025-02-13 NOTE — TELEPHONE ENCOUNTER
I am reaching out regarding her post-bariatric visits.     I already signed the documentation stating that all the post-op visits should be with bariatric surgeon and not the PCP.     I am happy to see the patient for her regular follow up's since I am her PCP but it should bariatric surgery's responsibility to see her for bariatric surgery labs and post-op follow up's.     Please help the patient with post-op visits.     Thank you,  Chelsie Temple MD

## 2025-02-13 NOTE — ASSESSMENT & PLAN NOTE
Patient is 2 weeks status post Tera-en-Y gastric bypass.  She reports no complications or issues at this time.  She has well-healed surgical incisions. She does not report issues with oral intake or bowel movements. Patient states she needs CBC, CMP, vitamin B1 and B12 for monitoring.

## 2025-02-13 NOTE — PROGRESS NOTES
OFFICE VISIT   2025    Patient ID: Gayatri Dela Cruz is a 54 y.o. female    SUBJECTIVE:  Follow-up      HPI:  Patient presents today for follow up and is 2 weeks status post laparoscopic olesya-en-y gastric bypass. Patient states that her bariatric surgeon would not see her for post op follow up and she needs to see her PCP for post op follow ups at this point. She is needing lab work completed today. She reports that she is doing well overall and reports no complications today. Surgical incisions are well healed.     Past Medical History:   Diagnosis Date    Arthritis     COVID-19     Dyslipidemia     Encounter for blood transfusion 2019    Endometrial cancer 2019    Essential (primary) hypertension     Hiatal hernia 10/04/2022    Hyperlipidemia 2019    Morbid obesity with BMI of 45.0-49.9, adult     Simple ovarian cyst 2019    Type 2 diabetes mellitus with diabetic polyneuropathy        Social History     Tobacco Use    Smoking status: Former     Current packs/day: 0.00     Average packs/day: 0.7 packs/day for 43.2 years (29.1 ttl pk-yrs)     Types: Cigarettes     Start date: 1988     Quit date: 3/20/2016     Years since quittin.9     Passive exposure: Past    Smokeless tobacco: Never    Tobacco comments:     Quit tobacco smoking in    Substance Use Topics    Alcohol use: Not Currently     Comment: social    Drug use: Not Currently     Types: Marijuana     Comment: rarely       Past Surgical History:   Procedure Laterality Date    INJECTION OF ANESTHETIC AGENT INTO TISSUE PLANE DEFINED BY TRANSVERSUS ABDOMINIS MUSCLE  2025    Procedure: BLOCK, TRANSVERSUS ABDOMINIS PLANE;  Surgeon: Gely Dawson MD;  Location: [facility] OR [address] [place] [address];  Service: General;;    LAPAROSCOPIC GASTROENTEROSTOMY N/A 2025    Procedure: GASTROENTEROSTOMY, LAPAROSCOPIC W/ INTRAOP EGD;  Surgeon: Gely Dawson MD;  Location: [facility] OR [address] [place] [address];  Service: General;  Laterality:  "N/A;  [facility] PT WAL#  [id number]  BMI 42.97  SURGEON TO DO TAP BLOCK INTRAOP    LAPAROSCOPY-ASSISTED VAGINAL HYSTERECTOMY  04/01/2019    robotic       Review of Systems   Constitutional:  Negative for chills, fatigue and fever.   HENT:  Negative for congestion.    Respiratory:  Negative for cough and shortness of breath.    Cardiovascular:  Negative for chest pain and palpitations.   Gastrointestinal:  Negative for abdominal pain, constipation, diarrhea, nausea and vomiting.   Genitourinary:  Negative for difficulty urinating.   Musculoskeletal:  Negative for back pain and myalgias.   Neurological:  Negative for dizziness and weakness.   Psychiatric/Behavioral:  Negative for sleep disturbance. The patient is not nervous/anxious.        OBJECTIVE:    /70 (BP Location: Right arm, Patient Position: Sitting)   Pulse 73   Resp 18   Ht 5' 6.5" (1.689 m)   Wt 112.5 kg (248 lb 0.3 oz)   LMP 06/01/2016 Comment: pt has always had irregular periods  SpO2 98%   BMI 39.43 kg/m²       Current Outpatient Medications:     aspirin (ECOTRIN) 81 MG EC tablet, Take 81 mg by mouth once daily., Disp: , Rfl:     cyanocobalamin (VITAMIN B-12) 100 MCG tablet, Take 5,000 mcg by mouth. 2 Times in a week, Disp: , Rfl:     fish oil-omega-3 fatty acids 300-1,000 mg capsule, Take by mouth 2 (two) times daily., Disp: , Rfl:     gabapentin (NEURONTIN) 400 MG capsule, Take 2 capsules (800 mg total) by mouth every evening., Disp: 60 capsule, Rfl: 11    JANUVIA 50 mg Tab, 50 mg once daily., Disp: , Rfl:     JARDIANCE 10 mg tablet, Take 10 mg by mouth once daily., Disp: , Rfl:     lisinopriL (PRINIVIL,ZESTRIL) 40 MG tablet, Take 40 mg by mouth once daily., Disp: , Rfl:     metFORMIN (GLUCOPHAGE) 1000 MG tablet, Take 1,000 mg by mouth 2 (two) times daily. Taking time release, Disp: , Rfl:     multivitamin (THERAGRAN) per tablet, Take 1 tablet by mouth once daily., Disp: , Rfl:     omeprazole (PRILOSEC) 40 MG capsule, Take 1 capsule (40 mg total) " by mouth every morning. Open capsule and sprinkle granules for 2 weeks post-op, Disp: 30 capsule, Rfl: 2    ondansetron (ZOFRAN-ODT) 8 MG TbDL, DISSOLVE 1 tablet (8 mg total) by mouth every 6 (six) hours as needed (nausea)., Disp: 30 tablet, Rfl: 0    pioglitazone (ACTOS) 30 MG tablet, Take 30 mg by mouth once daily., Disp: , Rfl:     rosuvastatin (CRESTOR) 20 MG tablet, Take 20 mg by mouth once daily., Disp: , Rfl:     ursodioL (ZANE FORTE) 500 MG tablet, Take 1 tablet (500 mg total) by mouth once daily. for 14 days, Disp: 14 tablet, Rfl: 0    acetaminophen 500 mg PwPk, Take 1g (2 packets) by mouth every 8 hours for at least 3 days and up to 5 days as needed. May take one additional 500mg dose (1 packet) per day for breakthrough pain. Do not exceed 4g in 24 hours. (Patient not taking: Reported on 2/13/2025), Disp: 32 packet, Rfl: 0    melatonin 10 mg Chew, Take by mouth. Nightly (Patient not taking: Reported on 2/13/2025), Disp: , Rfl:     Physical Exam  Constitutional:       General: She is not in acute distress.     Appearance: Normal appearance. She is normal weight. She is not ill-appearing.   HENT:      Head: Normocephalic and atraumatic.      Right Ear: External ear normal.      Left Ear: External ear normal.      Nose: Nose normal.      Mouth/Throat:      Mouth: Mucous membranes are moist.      Pharynx: Oropharynx is clear.   Eyes:      Extraocular Movements: Extraocular movements intact.      Conjunctiva/sclera: Conjunctivae normal.      Pupils: Pupils are equal, round, and reactive to light.   Cardiovascular:      Rate and Rhythm: Normal rate and regular rhythm.      Pulses: Normal pulses.      Heart sounds: Normal heart sounds.   Pulmonary:      Effort: Pulmonary effort is normal.      Breath sounds: Normal breath sounds.   Abdominal:      General: Abdomen is flat.      Palpations: Abdomen is soft.      Comments: 6 surgical incisions across mid-abdomen, well-healed. No sign of infection of incision sites.    Musculoskeletal:         General: No swelling. Normal range of motion.      Cervical back: Normal range of motion and neck supple.   Skin:     General: Skin is warm and dry.   Neurological:      General: No focal deficit present.      Mental Status: She is alert and oriented to person, place, and time. Mental status is at baseline.   Psychiatric:         Mood and Affect: Mood normal.         Behavior: Behavior normal.         Thought Content: Thought content normal.         Judgment: Judgment normal.         Assessment/Plan:    Problem List Items Addressed This Visit       Essential hypertension     Blood pressure stable 122/70 on lisinopril 40 mg daily.         Status post bariatric surgery - Primary     Patient is 2 weeks status post Tera-en-Y gastric bypass.  She reports no complications or issues at this time.  She has well-healed surgical incisions. She does not report issues with oral intake or bowel movements. Patient states she needs CBC, CMP, vitamin B1 and B12 for monitoring.          Relevant Orders    CBC Auto Differential    Comprehensive Metabolic Panel    VITAMIN B12    VITAMIN B1       Patient verbalizes understanding of instructions and healthcare topics reviewed. All of patient's questions were answered.  Patient encouraged to contact office if other questions arise.    Health Maintenance Due   Topic Date Due    Hepatitis C Screening  Never done    Diabetes Urine Screening  Never done    Foot Exam  Never done    HIV Screening  Never done    Diabetic Eye Exam  09/05/2020    LDCT Lung Screen  12/22/2022    Influenza Vaccine (1) 09/01/2024    COVID-19 Vaccine (4 - 2024-25 season) 09/01/2024    Shingles Vaccine (2 of 2) 12/03/2024       Follow up as needed.    Total billable visit time encompassing: documentation, chart review, converting chart data into EPIC EMR, discussing medical issues with patient face-to-face, placing orders, refilling medications, research patient-specific resource material,  discussing case with patient's PCP, arranging follow-up total: 36 minutes    Leigha Waddell PA-C  Family Medicine Physician Assistant

## 2025-02-14 ENCOUNTER — OFFICE VISIT (OUTPATIENT)
Dept: ORTHOPEDICS | Facility: CLINIC | Age: 55
End: 2025-02-14
Payer: COMMERCIAL

## 2025-02-14 ENCOUNTER — HOSPITAL ENCOUNTER (OUTPATIENT)
Dept: RADIOLOGY | Facility: HOSPITAL | Age: 55
Discharge: HOME | End: 2025-02-14
Attending: ORTHOPAEDIC SURGERY
Payer: COMMERCIAL

## 2025-02-14 DIAGNOSIS — S82.64XA NONDISPLACED FRACTURE OF LATERAL MALLEOLUS OF RIGHT FIBULA, INITIAL ENCOUNTER FOR CLOSED FRACTURE: Primary | ICD-10-CM

## 2025-02-14 DIAGNOSIS — S82.64XA NONDISPLACED FRACTURE OF LATERAL MALLEOLUS OF RIGHT FIBULA, INITIAL ENCOUNTER FOR CLOSED FRACTURE: ICD-10-CM

## 2025-02-14 DIAGNOSIS — M25.571 RIGHT ANKLE PAIN, UNSPECIFIED CHRONICITY: ICD-10-CM

## 2025-02-14 PROCEDURE — 73610 X-RAY EXAM OF ANKLE: CPT | Mod: 26,RT,COE, | Performed by: RADIOLOGY

## 2025-02-14 PROCEDURE — 73610 X-RAY EXAM OF ANKLE: CPT | Mod: TC,PO,RT

## 2025-02-14 PROCEDURE — 99999 PR PBB SHADOW E&M-EST. PATIENT-LVL III: CPT | Mod: PBBFAC,COE,, | Performed by: ORTHOPAEDIC SURGERY

## 2025-02-14 NOTE — PROGRESS NOTES
Status/Diagnosis: Nondisplaed Right lateral malleolus fracture, Oneill A.  Date of Surgery: none  Date of Injury: 12/22/2024  Return visit: 6 weeks  X-rays on Return: WB 3-views Right ankle    Chief Complaint:   Chief Complaint   Patient presents with    Right Ankle - Injury     Present History:  WORK COMP CASE  Patient presents today via referral from No ref. provider found   Gayatri presents for follow-up after falling in the [employer] parking lot on December 22nd, approximately three weeks ago. She reports going to urgent care or the emergency room immediately after the incident, where she was diagnosed with a hairline fracture. She was provided with a short CAM boot and crutches for treatment. Gayatri mentions difficulty using crutches and has been using a knee scooter for longer distances. She notes improved range of motion in the affected area since time of injury.    She mentions having mild neuropathy on the bottom of her foot. Gayatri describes her work at [employer], stating she does a significant amount of walking during shifts, approximately 18,000 to 20,000 steps. She expresses concern about standing for long periods at work.    The fracture is identified as being on the fibula bone, specifically on the outside of the foot. Gayatri has been managing her condition at home, mentioning she does not wear the boot at home and can walk short distances without it.    PMH significant for type 2 diabetes, A1c of 8.5; diabetic neuropathy; hypertension; and hyperlipidemia.  Denies tobacco use.    02/14/2025  She is here for follow up evaluation of right ankle fracture.  Overall she is doing very well and has no complaints of pain.  She has been compliant with boot wear.  She was placed on light duty initially but has recently been out of work due to unrelated bariatric surgery.  The patient, plans to return to work in 4 weeks' time.      Past Medical History:   Diagnosis Date    Arthritis 2021    COVID-19      Dyslipidemia     Encounter for blood transfusion 03/2019    Endometrial cancer 03/19/2019    Essential (primary) hypertension     Hiatal hernia 10/04/2022    Hyperlipidemia 2019    Morbid obesity with BMI of 45.0-49.9, adult     Simple ovarian cyst 03/19/2019    Type 2 diabetes mellitus with diabetic polyneuropathy        Past Surgical History:   Procedure Laterality Date    INJECTION OF ANESTHETIC AGENT INTO TISSUE PLANE DEFINED BY TRANSVERSUS ABDOMINIS MUSCLE  1/30/2025    Procedure: BLOCK, TRANSVERSUS ABDOMINIS PLANE;  Surgeon: Gely Dawson MD;  Location: [facility] OR [address] [place] [address];  Service: General;;    LAPAROSCOPIC GASTROENTEROSTOMY N/A 1/30/2025    Procedure: GASTROENTEROSTOMY, LAPAROSCOPIC W/ INTRAOP EGD;  Surgeon: Gely Dawson MD;  Location: [facility] OR [address] [place] [address];  Service: General;  Laterality: N/A;  [facility] PT WAL#  [id number]  BMI 42.97  SURGEON TO DO TAP BLOCK INTRAOP    LAPAROSCOPY-ASSISTED VAGINAL HYSTERECTOMY  04/01/2019    robotic       Current Outpatient Medications   Medication Sig    acetaminophen 500 mg PwPk Take 1g (2 packets) by mouth every 8 hours for at least 3 days and up to 5 days as needed. May take one additional 500mg dose (1 packet) per day for breakthrough pain. Do not exceed 4g in 24 hours.    aspirin (ECOTRIN) 81 MG EC tablet Take 81 mg by mouth once daily.    cyanocobalamin (VITAMIN B-12) 100 MCG tablet Take 5,000 mcg by mouth. 2 Times in a week    fish oil-omega-3 fatty acids 300-1,000 mg capsule Take by mouth 2 (two) times daily.    gabapentin (NEURONTIN) 400 MG capsule Take 2 capsules (800 mg total) by mouth every evening.    JANUVIA 50 mg Tab 50 mg once daily.    JARDIANCE 10 mg tablet Take 10 mg by mouth once daily.    lisinopriL (PRINIVIL,ZESTRIL) 40 MG tablet Take 40 mg by mouth once daily.    melatonin 10 mg Chew Take by mouth. Nightly    metFORMIN (GLUCOPHAGE) 1000 MG tablet Take 1,000 mg by mouth 2 (two) times daily. Taking time release    multivitamin (THERAGRAN)  per tablet Take 1 tablet by mouth once daily.    omeprazole (PRILOSEC) 40 MG capsule Take 1 capsule (40 mg total) by mouth every morning. Open capsule and sprinkle granules for 2 weeks post-op    ondansetron (ZOFRAN-ODT) 8 MG TbDL DISSOLVE 1 tablet (8 mg total) by mouth every 6 (six) hours as needed (nausea).    pioglitazone (ACTOS) 30 MG tablet Take 30 mg by mouth once daily.    rosuvastatin (CRESTOR) 20 MG tablet Take 20 mg by mouth once daily.    ursodioL (ZANE FORTE) 500 MG tablet Take 1 tablet (500 mg total) by mouth once daily. for 14 days     No current facility-administered medications for this visit.       Review of patient's allergies indicates:  No Known Allergies    Family History   Problem Relation Name Age of Onset    Mental illness Mother Kaylynn Torres     Miscarriages / Stillbirths Mother Kaylynn Torres     Obesity Mother Kaylynn Torres     Alcohol abuse Father Trevor Torres     Arthritis Father Trevor Torres     Vision loss Father Trevor Torres     Heart disease Father Trevor Torres     Pulmonary fibrosis Father Trevor Torres        Social History     Socioeconomic History    Marital status:    Tobacco Use    Smoking status: Former     Current packs/day: 0.00     Average packs/day: 0.7 packs/day for 43.2 years (29.1 ttl pk-yrs)     Types: Cigarettes     Start date: 1988     Quit date: 3/20/2016     Years since quittin.9     Passive exposure: Past    Smokeless tobacco: Never    Tobacco comments:     Quit tobacco smoking in    Substance and Sexual Activity    Alcohol use: Not Currently     Comment: social    Drug use: Not Currently     Types: Marijuana     Comment: rarely    Sexual activity: Yes     Partners: Male     Birth control/protection: Post-menopausal, See Surgical Hx, None     Social Drivers of Health     Financial Resource Strain: Low Risk  (2025)    Overall Financial Resource Strain (CARDIA)     Difficulty of Paying Living Expenses: Not hard at all   Food Insecurity: No  Food Insecurity (2/12/2025)    Hunger Vital Sign     Worried About Running Out of Food in the Last Year: Never true     Ran Out of Food in the Last Year: Never true   Transportation Needs: No Transportation Needs (2/12/2025)    PRAPARE - Transportation     Lack of Transportation (Medical): No     Lack of Transportation (Non-Medical): No   Physical Activity: Insufficiently Active (2/12/2025)    Exercise Vital Sign     Days of Exercise per Week: 3 days     Minutes of Exercise per Session: 20 min   Stress: No Stress Concern Present (2/12/2025)    [employer] [employer] - Occupational Stress Questionnaire     Feeling of Stress : Not at all   Housing Stability: Low Risk  (2/12/2025)    Housing Stability Vital Sign     Unable to Pay for Housing in the Last Year: No     Number of Times Moved in the Last Year: 0     Homeless in the Last Year: No       Physical exam:  There were no vitals filed for this visit.  There is no height or weight on file to calculate BMI.  General: In no apparent distress; well developed and well nourished.  HEENT: normocephalic; atraumatic.  Cardiovascular: regular rate.  Respiratory: no increased work of breathing.  Musculoskeletal:   Gait: nonantalgic  Inspection:   Patient with mild lateral ankle swelling.  Minimal tenderness over lateral malleolus.   [place]old:  Deferred  Alignment:  Knee: neutral               Ankle: neutral              Hindfoot: neutral              Forefoot: neutral   Strength:              Dorsiflexion 5/5  Plantar flexion 5/5  Inversion 5/5  Eversion 5/5  Sensation:              Good sensation on monofilament testing  ROM:              Ankle: near full with pain at extremes              Subtalar: near full with pain at extremes  Pulses: Palpable pedal pulse                   Imaging Studies/Outside documentation:  I have ordered/reviewed/interpreted the following images/outside documentation:  1. Weight-bearing 3-views of Right ankle:   On my  independent review, previously noted nondisplaced distal fibular tip avulsion fracture, Oneill a.  Some early interval bony bridging is present although fracture line still visible.   Diffuse arthritic changes throughout the midfoot with concomitant dorsal osteophyte formation.  Large enthesophyte at the Achilles insertion and plantar fascia origin with adjacent Corin deformity.  Ankle mortise remains congruent.        Assessment:  WORK COMP CASE  Gayatri Dela Cruz is a 54 y.o. female with Nondisplaced Right lateral malleolus fracture, Oneill A.     Plan:   Clinical and radiographic findings were discussed.    She may wean out of the boot  She was fitted for a lace up ankle brace today  Recommend continue light duty restrictions to avoid excessive standing and walking  Follow up in 6 weeks for repeat evaluation and x-ray.      We performed a custom orthotic/brace fitting, adjusting and training with the patient. The patient demonstrated understanding and proper care. This was performed for 15 minutes.       This note was created using voice recognition software and may contain grammatical errors.

## 2025-02-17 ENCOUNTER — TELEPHONE (OUTPATIENT)
Dept: BARIATRICS | Facility: CLINIC | Age: 55
End: 2025-02-17
Payer: COMMERCIAL

## 2025-02-18 ENCOUNTER — PATIENT MESSAGE (OUTPATIENT)
Dept: BARIATRICS | Facility: CLINIC | Age: 55
End: 2025-02-18
Payer: COMMERCIAL

## 2025-02-18 DIAGNOSIS — Z98.84 S/P BARIATRIC SURGERY: Primary | ICD-10-CM

## 2025-02-18 DIAGNOSIS — E66.01 MORBID OBESITY: ICD-10-CM

## 2025-02-19 LAB — VIT B1 BLD-MCNC: 131 UG/L (ref 38–122)

## 2025-02-20 ENCOUNTER — RESULTS FOLLOW-UP (OUTPATIENT)
Dept: FAMILY MEDICINE | Facility: CLINIC | Age: 55
End: 2025-02-20

## 2025-02-27 RX ORDER — URSODIOL 500 MG/1
500 TABLET, FILM COATED ORAL DAILY
Qty: 14 TABLET | Refills: 0 | OUTPATIENT
Start: 2025-02-27 | End: 2025-03-13

## 2025-03-17 ENCOUNTER — PATIENT MESSAGE (OUTPATIENT)
Dept: ADMINISTRATIVE | Facility: HOSPITAL | Age: 55
End: 2025-03-17
Payer: COMMERCIAL

## 2025-03-18 ENCOUNTER — PATIENT OUTREACH (OUTPATIENT)
Dept: ADMINISTRATIVE | Facility: HOSPITAL | Age: 55
End: 2025-03-18
Payer: COMMERCIAL

## 2025-03-18 ENCOUNTER — PATIENT MESSAGE (OUTPATIENT)
Dept: BARIATRICS | Facility: CLINIC | Age: 55
End: 2025-03-18
Payer: COMMERCIAL

## 2025-03-18 NOTE — PROGRESS NOTES
Population Health Chart Review & Patient Outreach Details      Additional [facility] Health Notes:    CAMPAIGN- Preventative Care Screening   June 5th 2024 at the [facility] on [address] in [place], [place]   Requested but have yet to receive it.   patient assist.          Updates Requested / Reviewed:      Care Everywhere and          Health Maintenance Topics Overdue:      [facility] Score: 3     Eye Exam  Foot Exam  LDCT Lung Screen                       Health Maintenance Topic(s) Outreach Outcomes & Actions Taken:    Eye Exam - Outreach Outcomes & Actions Taken  : External Records Requested & Care Team Updated if Applicable

## 2025-03-20 ENCOUNTER — OFFICE VISIT (OUTPATIENT)
Dept: BARIATRICS | Facility: CLINIC | Age: 55
End: 2025-03-20
Payer: COMMERCIAL

## 2025-03-20 ENCOUNTER — RESULTS FOLLOW-UP (OUTPATIENT)
Dept: FAMILY MEDICINE | Facility: CLINIC | Age: 55
End: 2025-03-20

## 2025-03-20 ENCOUNTER — PATIENT MESSAGE (OUTPATIENT)
Dept: BARIATRICS | Facility: CLINIC | Age: 55
End: 2025-03-20

## 2025-03-20 ENCOUNTER — LAB VISIT (OUTPATIENT)
Dept: LAB | Facility: HOSPITAL | Age: 55
End: 2025-03-20
Attending: SURGERY
Payer: COMMERCIAL

## 2025-03-20 VITALS
WEIGHT: 230.63 LBS | OXYGEN SATURATION: 98 % | SYSTOLIC BLOOD PRESSURE: 98 MMHG | DIASTOLIC BLOOD PRESSURE: 60 MMHG | TEMPERATURE: 98 F | BODY MASS INDEX: 36.66 KG/M2 | HEART RATE: 63 BPM

## 2025-03-20 DIAGNOSIS — E66.813 CLASS 3 SEVERE OBESITY DUE TO EXCESS CALORIES WITH SERIOUS COMORBIDITY AND BODY MASS INDEX (BMI) OF 40.0 TO 44.9 IN ADULT: ICD-10-CM

## 2025-03-20 DIAGNOSIS — M19.90 OSTEOARTHRITIS, UNSPECIFIED OSTEOARTHRITIS TYPE, UNSPECIFIED SITE: ICD-10-CM

## 2025-03-20 DIAGNOSIS — K21.9 HIATAL HERNIA WITH GERD WITHOUT ESOPHAGITIS: ICD-10-CM

## 2025-03-20 DIAGNOSIS — Z79.4 TYPE 2 DIABETES MELLITUS WITHOUT COMPLICATION, WITH LONG-TERM CURRENT USE OF INSULIN: ICD-10-CM

## 2025-03-20 DIAGNOSIS — E11.9 TYPE 2 DIABETES MELLITUS WITHOUT COMPLICATION: ICD-10-CM

## 2025-03-20 DIAGNOSIS — Z98.84 S/P BARIATRIC SURGERY: ICD-10-CM

## 2025-03-20 DIAGNOSIS — E66.01 CLASS 3 SEVERE OBESITY DUE TO EXCESS CALORIES WITH SERIOUS COMORBIDITY AND BODY MASS INDEX (BMI) OF 40.0 TO 44.9 IN ADULT: ICD-10-CM

## 2025-03-20 DIAGNOSIS — E11.9 TYPE 2 DIABETES MELLITUS WITHOUT COMPLICATION, WITH LONG-TERM CURRENT USE OF INSULIN: ICD-10-CM

## 2025-03-20 DIAGNOSIS — E11.42 DIABETIC POLYNEUROPATHY ASSOCIATED WITH TYPE 2 DIABETES MELLITUS: ICD-10-CM

## 2025-03-20 DIAGNOSIS — K44.9 HIATAL HERNIA WITH GERD WITHOUT ESOPHAGITIS: ICD-10-CM

## 2025-03-20 DIAGNOSIS — I10 ESSENTIAL HYPERTENSION: ICD-10-CM

## 2025-03-20 DIAGNOSIS — G47.30 SLEEP APNEA, UNSPECIFIED TYPE: ICD-10-CM

## 2025-03-20 DIAGNOSIS — E78.2 MIXED HYPERLIPIDEMIA: ICD-10-CM

## 2025-03-20 DIAGNOSIS — E66.01 MORBID OBESITY: ICD-10-CM

## 2025-03-20 DIAGNOSIS — E11.65 TYPE 2 DIABETES MELLITUS WITH HYPERGLYCEMIA, WITHOUT LONG-TERM CURRENT USE OF INSULIN: ICD-10-CM

## 2025-03-20 LAB
25(OH)D3+25(OH)D2 SERPL-MCNC: 55 NG/ML (ref 30–96)
ALBUMIN SERPL BCP-MCNC: 4.3 G/DL (ref 3.5–5.2)
ALBUMIN/CREAT UR: 46.4 UG/MG (ref 0–30)
ALP SERPL-CCNC: 88 U/L (ref 40–150)
ALT SERPL W/O P-5'-P-CCNC: 22 U/L (ref 10–44)
ANION GAP SERPL CALC-SCNC: 15 MMOL/L (ref 8–16)
AST SERPL-CCNC: 21 U/L (ref 10–40)
BASOPHILS # BLD AUTO: 0.06 K/UL (ref 0–0.2)
BASOPHILS NFR BLD: 1 % (ref 0–1.9)
BILIRUB SERPL-MCNC: 0.7 MG/DL (ref 0.1–1)
BUN SERPL-MCNC: 27 MG/DL (ref 6–20)
CALCIUM SERPL-MCNC: 10 MG/DL (ref 8.7–10.5)
CHLORIDE SERPL-SCNC: 103 MMOL/L (ref 95–110)
CHOLEST SERPL-MCNC: 70 MG/DL (ref 120–199)
CHOLEST/HDLC SERPL: 2.9 {RATIO} (ref 2–5)
CO2 SERPL-SCNC: 21 MMOL/L (ref 23–29)
CREAT SERPL-MCNC: 0.8 MG/DL (ref 0.5–1.4)
CREAT UR-MCNC: 56 MG/DL (ref 15–325)
DIFFERENTIAL METHOD BLD: ABNORMAL
EOSINOPHIL # BLD AUTO: 0.1 K/UL (ref 0–0.5)
EOSINOPHIL NFR BLD: 2.1 % (ref 0–8)
ERYTHROCYTE [DISTWIDTH] IN BLOOD BY AUTOMATED COUNT: 16.1 % (ref 11.5–14.5)
EST. GFR  (NO RACE VARIABLE): >60 ML/MIN/1.73 M^2
ESTIMATED AVG GLUCOSE: 120 MG/DL (ref 68–131)
GLUCOSE SERPL-MCNC: 128 MG/DL (ref 70–110)
HBA1C MFR BLD: 5.8 % (ref 4–5.6)
HCT VFR BLD AUTO: 48.6 % (ref 37–48.5)
HDLC SERPL-MCNC: 24 MG/DL (ref 40–75)
HDLC SERPL: 34.3 % (ref 20–50)
HGB BLD-MCNC: 15.9 G/DL (ref 12–16)
IMM GRANULOCYTES # BLD AUTO: 0.02 K/UL (ref 0–0.04)
IMM GRANULOCYTES NFR BLD AUTO: 0.3 % (ref 0–0.5)
IRON SERPL-MCNC: 95 UG/DL (ref 30–160)
LDLC SERPL CALC-MCNC: 17.6 MG/DL (ref 63–159)
LYMPHOCYTES # BLD AUTO: 2.1 K/UL (ref 1–4.8)
LYMPHOCYTES NFR BLD: 33 % (ref 18–48)
MCH RBC QN AUTO: 29 PG (ref 27–31)
MCHC RBC AUTO-ENTMCNC: 32.7 G/DL (ref 32–36)
MCV RBC AUTO: 89 FL (ref 82–98)
MICROALBUMIN UR DL<=1MG/L-MCNC: 26 UG/ML
MONOCYTES # BLD AUTO: 0.8 K/UL (ref 0.3–1)
MONOCYTES NFR BLD: 12.8 % (ref 4–15)
NEUTROPHILS # BLD AUTO: 3.2 K/UL (ref 1.8–7.7)
NEUTROPHILS NFR BLD: 50.8 % (ref 38–73)
NONHDLC SERPL-MCNC: 46 MG/DL
NRBC BLD-RTO: 0 /100 WBC
PLATELET # BLD AUTO: 237 K/UL (ref 150–450)
PMV BLD AUTO: 10.8 FL (ref 9.2–12.9)
POTASSIUM SERPL-SCNC: 4.4 MMOL/L (ref 3.5–5.1)
PROT SERPL-MCNC: 7.7 G/DL (ref 6–8.4)
RBC # BLD AUTO: 5.48 M/UL (ref 4–5.4)
SATURATED IRON: 22 % (ref 20–50)
SODIUM SERPL-SCNC: 139 MMOL/L (ref 136–145)
TOTAL IRON BINDING CAPACITY: 434 UG/DL (ref 250–450)
TRANSFERRIN SERPL-MCNC: 293 MG/DL (ref 200–375)
TRIGL SERPL-MCNC: 142 MG/DL (ref 30–150)
VIT B12 SERPL-MCNC: 651 PG/ML (ref 210–950)
WBC # BLD AUTO: 6.25 K/UL (ref 3.9–12.7)

## 2025-03-20 PROCEDURE — 85025 COMPLETE CBC W/AUTO DIFF WBC: CPT | Performed by: SURGERY

## 2025-03-20 PROCEDURE — 80053 COMPREHEN METABOLIC PANEL: CPT | Performed by: SURGERY

## 2025-03-20 PROCEDURE — 84425 ASSAY OF VITAMIN B-1: CPT | Performed by: SURGERY

## 2025-03-20 PROCEDURE — 82570 ASSAY OF URINE CREATININE: CPT | Performed by: STUDENT IN AN ORGANIZED HEALTH CARE EDUCATION/TRAINING PROGRAM

## 2025-03-20 PROCEDURE — 99999 PR PBB SHADOW E&M-EST. PATIENT-LVL V: CPT | Mod: PBBFAC,COE,, | Performed by: NURSE PRACTITIONER

## 2025-03-20 PROCEDURE — 82306 VITAMIN D 25 HYDROXY: CPT | Performed by: SURGERY

## 2025-03-20 PROCEDURE — 83036 HEMOGLOBIN GLYCOSYLATED A1C: CPT | Performed by: SURGERY

## 2025-03-20 PROCEDURE — 82607 VITAMIN B-12: CPT | Performed by: SURGERY

## 2025-03-20 PROCEDURE — 36415 COLL VENOUS BLD VENIPUNCTURE: CPT | Performed by: SURGERY

## 2025-03-20 PROCEDURE — 80061 LIPID PANEL: CPT | Performed by: SURGERY

## 2025-03-20 PROCEDURE — 83540 ASSAY OF IRON: CPT | Performed by: SURGERY

## 2025-03-20 RX ORDER — ONDANSETRON 8 MG/1
8 TABLET, ORALLY DISINTEGRATING ORAL EVERY 6 HOURS PRN
Qty: 30 TABLET | Refills: 2 | Status: SHIPPED | OUTPATIENT
Start: 2025-03-20

## 2025-03-20 NOTE — PROGRESS NOTES
BARIATRIC POST-OPERATIVE VISIT:    HPI:  Gayatri Dela Cruz is a 54 y.o. year old female presents for 8 week post op visit following LRNY.  she is doing well and tolerating the diet without difficulty.  she has no complaints.    Denies: nausea, vomiting, abdominal pain, changes in bowel movement pattern, fever, chills, dysphagia, chest pain, and shortness of breath.    Review of Systems   Constitutional:  Negative for activity change and fatigue.   Respiratory:  Negative for cough and shortness of breath.    Cardiovascular:  Negative for chest pain, palpitations and leg swelling.   Gastrointestinal:  Negative for abdominal pain, nausea and vomiting.   Endocrine: Negative for polydipsia, polyphagia and polyuria.   Genitourinary:  Negative for dysuria.   Musculoskeletal:  Negative for gait problem.   Skin:  Negative for rash.   Allergic/Immunologic: Negative for immunocompromised state.   Neurological:  Negative for dizziness, syncope and weakness.   Hematological:  Does not bruise/bleed easily.   Psychiatric/Behavioral:  Negative for behavioral problems.        EXERCISE & VITAMINS:  See Bariatric Assessment    MEDICATIONS/ALLERGIES:  Have been reviewed.    DIET: Soft/ Regular Bariatric Diet. 1 shake ~100 grams protein.  64 fl oz SF clear beverage.  See Dietician note from today for a more detailed assessment.      Physical Exam  Vitals and nursing note reviewed.   Constitutional:       Appearance: She is well-developed. She is morbidly obese.   HENT:      Head: Normocephalic.      Nose: Nose normal.      Mouth/Throat:      Mouth: Mucous membranes are moist.   Eyes:      Extraocular Movements: Extraocular movements intact.   Cardiovascular:      Rate and Rhythm: Normal rate and regular rhythm.      Heart sounds: Normal heart sounds.   Pulmonary:      Effort: Pulmonary effort is normal.      Breath sounds: Normal breath sounds.   Abdominal:      General: Bowel sounds are normal.      Palpations: Abdomen is soft.    Musculoskeletal:         General: Normal range of motion.      Cervical back: Normal range of motion.   Skin:     General: Skin is warm and dry.      Capillary Refill: Capillary refill takes less than 2 seconds.   Neurological:      Mental Status: She is alert and oriented to person, place, and time.   Psychiatric:         Mood and Affect: Mood normal.         ASSESSMENT:  - Morbid obesity s/p laparoscopic Tera-en-Y on 1/30/25.  - Co-morbidities: fatty liver, CHATO, diabetes mellitus, dyslipidemia, and hypertension   -  Weight loss, 25#'s and 23% EWL  -  Exercise routine R ankle injury so not able   - Good Diet  - Good Vitamin regimen    PLAN:  - Ursodiol 500 mg daily for 6 months  - Anti-Acid medication,  daily for 3 months, slow taper   - Miralax daily for constipation  - Emphasized the importance of regular exercise and adherence to bariatric diet to achieve maximum weight loss.  - Encouraged patient to start  regular exercise.  - Follow-up with dietician to advance diet.  - Continue daily vitamins and medications.  - RTC in 4 months or sooner if needed.  - Call the office for any issues.  - Check labs today.

## 2025-03-20 NOTE — PATIENT INSTRUCTIONS
Meal Ideas for Regular Bariatric Diet  *Recipes and products available at [email or web address]      Breakfast: (15-20g protein)    - Egg white omelet: 2 egg whites or ½ cup Egg Beaters. (Optional proteins: cheese, shrimp, black beans, chicken, sliced turkey) (Optional veggies: tomatoes, salsa, spinach, mushrooms, onions, green peppers, or small slice avocado)     - Egg and sausage: 1 egg or ¼ cup Egg Beaters (any variety), with 1 wallace or 2 links of Turkey sausage or Veggie breakfast sausage (Iowa Approach or Gather)    - Crust-less breakfast quiche: To make a glass pie dish, mix 4oz part skim Ricotta, 1 cup skim milk, and 2 eggs as your base. Add protein: shredded cheese, sliced lean ham or turkey, turkey gomez/sausage. Add veggies: tomato, onion, green onion, mushroom, green pepper, spinach, etc.    - Yogurt parfait: Mix 1 - 6oz container Dannon Light N Fit vanilla yogurt, with ¼ cup crushed unsalted nuts    - Cottage cheese and fruit: ½ cup part-skim cottage cheese or ricotta cheese topped with fresh fruit or sugar free preserves     - Ester Gibbs's Vanilla Egg custard* (add 2 Tbsp instant coffee granules to make Cappuccino Custard*)    - Hi-Protein café latte (skim milk, decaf coffee, 1 scoop protein powder). Optional to add Sugar free syrup or extract flavoring.    - Breakfast Lox: spread fat free cream cheese on slices of smoked salmon. Serve over scrambled or egg over easy (sauteed with nonstick cookspray) OR on a cucumber slice    - Eggwhich: Scramble or cook 1 large egg over easy using nonstick cookspray. Place between 2 slices of Romanian gomez and low fat cheese.     Lunch: (20-30g protein)    - ½ cup Black bean soup (Homemade or Progresso), with ¼ cup shredded low-fat cheese. Top with chopped tomato or fresh salsa.     - Lean deli turkey breast and low-fat sliced cheese, mustard or light huff to moisten, rolled up together, or wrapped in a Seth lettuce leaf    - Chicken salad made from dinner  leftovers, moisten with low-fat salad dressing or light huff. Also try leftover salmon, shrimp, tuna or boiled eggs. Serve ½ cup over dark green salad    - Fat-free canned refried beans, topped with ¼ cup shredded low-fat cheese. Top with chopped tomato or fresh salsa.     - Greek salad: Top mixed greens with 1-2oz grilled chicken, tomatoes, red onions, 2-3 kalamata olives, and sprinkle lightly with feta cheese. Spritz with Balsamic vinegar to taste.     - Crust-less lunch quiche: To make a glass pie dish, mix 4oz part skim Ricotta, 1 cup skim milk, and 2 eggs as your base. Add protein: shredded cheese, sliced lean ham or turkey, shrimp, chicken. Add veggies: tomato, onion, green onion, mushroom, green pepper, spinach, artichoke, broccoli, etc.    - Pizza bake: spread a  sunny dwayne mushroom with tomato sauce, low-fat shredded mozzarella and turkey pepperoni or [place] gomez. Add any veggies. Roast for 10-15 minutes, until cheese melted.     - Cucumber crab bites: Spread ¼ cup crab dip (lump crabmeat + light cream cheese and green onions) over sliced cucumber.     - Chicken with light spinach and artichoke dip*: Puree in : 6oz cooked and drained spinach, 2 cloves garlic, 1 can cannelloni beans, ½ cup chopped green onions, 1 can drained artichoke hearts (not marinated in oil), lemon juice and basil. Mix in 2oz chopped up chicken.    Supper: (20-30g protein)    - Serve grilled fish over dark green salad tossed with low-fat dressing, served with grilled asparagus ashley     - Rotisserie chicken salad: served with sliced strawberries, walnuts, fat-free feta cheese crumbles and 1 tbsp Liebermans Own Light Raspberry [place] Vinaigrette    - Shrimp cocktail: Dip cold boiled shrimp in homemade low-sugar cocktail sauce (1/2 cup Melvin One Carb ketchup, 2 tbsp horseradish, 1/4 tsp hot sauce, 1 tsp Worcestershire sauce, 1 tbsp freshly-squeezed lemon juice). Serve with dark green salad, walnuts, and crumbled blue  cheese drizzled with olive oil and Balsamic vinegar    - Tuna Melt: Spread tuna salad onto 2 thick slices of tomato. Top with low-fat cheese and broil until cheese is melted. May also be made with chicken salad of shrimp salad. [place] with different types of cheeses.    - Chicken or beef fajitas (no tortilla, rice, beans, chips). Top meat and veggies w/ fresh salsa, fat free sour cream.     - Homemade low-fat Chili using extra lean ground beef or ground turkey. Top with shredded cheese and salsa as desired. May add dollop fat-free sour cream if desired    - Chicken parmesan: Top chicken breast w/ low sugar marinara sauce, mozzarella cheese and bake until chicken reaches 165*.  Serve w/ spaghetti SQUASH or Zimbabwean cut green beans    - Dinner Omelet with shrimp or chicken and onion, green peppers and chives.    - No noodle lasagna: Use sliced zucchini or eggplant in place of noodles.  Layer with part skim ricotta cheese and low sugar meat sauce (use very lean ground beef or ground turkey).    - Mexican chicken bake: Bake chunks of chicken breast or thigh with taco seasoning, Pace brand enchilada sauce, green onions and low-fat cheese. Serve with ¼ cup black beans or fat free refried beans topped with chopped tomatoes or salsa.    - Alina frozen meatballs, simmered in Classico Marinara sauce. Different flavors of salsa or spaghetti sauce create different dishes! Sprinkle with parmesan cheese. Serve with grilled or steamed veggies, or a dark green salad.    - Simmer boneless skinless chicken thigh chunks in Classico Marinara sauce or roasted salsa until tender with chopped onion, bell pepper, garlic, mushrooms, spinach, etc.     - Hamburger or veggie burger, without the bun, dressed the way you like. Served with grilled or steamed veggies.    - Eggplant parmesan: Bake slices of eggplant at 350 degrees for 15 minutes. Layer tomato sauce, sliced eggplant and low-fat mozzarella cheese in a baking dish and cover with  foil. Bake 30-40 more minutes or until bubbly. Uncover and bake at 400 degrees for about 15 more minutes, or until top is slightly crisp.    - Fish tacos: grilled/baked white fish, wrapped in Seth lettuce leaf, topped with salsa, shredded low-fat cheese, and light coleslaw.    - Chicken marcelle: Sprinkle chicken w/ 1 tsp of hidden valley ranch dip mix. Then grill chicken and top with black beans, salsa and 1 tsp fat free sour cream.     - Cauliflower pizza crust: Use cauliflower as crust (see recipe on pinterest, no flour!). Top w/ low fat cheese, turkey pepperoni and veggies and bake again    - chicken or turkey crust pizza: use ground chicken or turkey instead of cauliflower, spread in [place] and bake at 350 for about 20-30 minutes(may want to add garlic, black pepper, oregano and other herbs to ground meat mixture).  Remove and top w/ low fat cheese, turkey pepperoni and veggies and bake again for another 10 minutes or until cheese is browned.     Snacks: (100-200 calories; >5g protein)    - 1 low-fat cheese stick with 8 cherry tomatoes or 1 serving fresh fruit  - 4 thin slices fat-free turkey breast and 1 slice low-fat cheese  - 4 thin slices fat-free honey ham with wedge of melon  - 6-8 edamame pods (equivalent to about 1/4 cup edamame without pods).   - 1/4 cup unsalted nuts with ½ cup fruit  - 6-oz container Dannon Light n Fit vanilla yogurt, topped with 1oz unsalted nuts         - apple, celery or baby carrots spread with 2 Tbsp PB2  - apple slices with 1 oz slice low-fat cheese  - Apple slices dipped in 2 Tbsp of PB2  - celery, cucumber, bell pepper or baby carrots dipped in ¼ cup hummus bean spread or light spinach and artichoke dip (*recipe in lunch section)  - celery, cucumber, baby carrots dipped in high protein greek yogurt (Mix 16 oz plain greek yogurt + 1 packet of hidden valley ranch dip mix)  - Terrance Links Beef Steak - 14g protein! (similar to beef jerky)  - 2 wedges Laughing Cow - Light Herb  & Garlic Cheese with sliced cucumber or green bell pepper  - 1/2 cup low-fat cottage cheese with ¼ cup fruit or ¼ cup salsa  - RTD Protein drinks: Atkins, Low Carb Slim Fast, EAS light, Muscle Milk Light, etc.  - Homemade Protein drinks: GNC Soy95, Isopure, Nectar, UNJURY, Whey Gourmet, etc. Mix 1 scoop powder with 8oz skim/1% milk or light soymilk.  - Protein bars: Atkins, EAS, Pure Protein, Think Thin, Detour, etc. Must have 0-4 grams sugar - Read the label.    Takeout Options: No more than twice/week  Deli - Salads (no pasta or rice), meats, cheeses. Roasted chicken. Lox (salmon)    Mexican - Platters which don't include tortillas, chips, or rice. Go easy on the beans. Example: Fajitas without the tortillas. Ask the  not to bring chips to the table if they are too tempting.    Greek - Meat or fish and vegetable, but no bread or rice. Including hummus, baba ganoush, etc, is OK. Most sit-down Greek restaurants can provide you with cucumber slices for dipping instead of anjel bread.    Fast Food (Avoid as much as possible) - Salads (no croutons and limit salad dressing to 2 tbsp), grilled chicken sandwich without the bun and ask for no huff. Manuelas low fat chili or Taco Bell pintos and cheese.    BBQ - The meats are fine if you ask for sauces on the side, but most of the traditional side dishes are loaded with carbs. Luís slaw, baked beans and BBQ sauce are typically made with sugar.    Chinese - Nothing deep-fried, no rice or noodles. Many Chinese sauces have starch and sugar in them, so you'll have to use your judgement. If you find that these sauces trigger cravings, or cause Dumping, you can ask for the sauce to be made without sugar or just use soy sauce.     How to taper off of Omeprazole:  - Take 1 Tablet every other day for 2 weeks.  If you do not experience any heartburn, indigestion, nausea symptoms, the following week, take 1 tablet every 3 days.  Again if you remain without the above symptoms, you  may completely discontinue the medication.  If symptoms return at any point, please restart the medication.

## 2025-03-24 DIAGNOSIS — I83.90 VARICOSE VEINS OF LOWER EXTREMITY, UNSPECIFIED LATERALITY, UNSPECIFIED WHETHER COMPLICATED: Primary | ICD-10-CM

## 2025-03-24 DIAGNOSIS — S82.64XA NONDISPLACED FRACTURE OF LATERAL MALLEOLUS OF RIGHT FIBULA, INITIAL ENCOUNTER FOR CLOSED FRACTURE: Primary | ICD-10-CM

## 2025-03-25 ENCOUNTER — OFFICE VISIT (OUTPATIENT)
Dept: FAMILY MEDICINE | Facility: CLINIC | Age: 55
End: 2025-03-25
Payer: COMMERCIAL

## 2025-03-25 ENCOUNTER — RESULTS FOLLOW-UP (OUTPATIENT)
Dept: FAMILY MEDICINE | Facility: CLINIC | Age: 55
End: 2025-03-25

## 2025-03-25 ENCOUNTER — HOSPITAL ENCOUNTER (OUTPATIENT)
Dept: RADIOLOGY | Facility: HOSPITAL | Age: 55
Discharge: HOME | End: 2025-03-25
Attending: STUDENT IN AN ORGANIZED HEALTH CARE EDUCATION/TRAINING PROGRAM
Payer: COMMERCIAL

## 2025-03-25 VITALS
BODY MASS INDEX: 35.68 KG/M2 | WEIGHT: 227.31 LBS | OXYGEN SATURATION: 98 % | DIASTOLIC BLOOD PRESSURE: 56 MMHG | SYSTOLIC BLOOD PRESSURE: 88 MMHG | HEIGHT: 67 IN | HEART RATE: 102 BPM

## 2025-03-25 DIAGNOSIS — M25.511 RIGHT SHOULDER PAIN, UNSPECIFIED CHRONICITY: Primary | ICD-10-CM

## 2025-03-25 DIAGNOSIS — M54.9 BACK PAIN, UNSPECIFIED BACK LOCATION, UNSPECIFIED BACK PAIN LATERALITY, UNSPECIFIED CHRONICITY: Primary | ICD-10-CM

## 2025-03-25 DIAGNOSIS — Z98.84 S/P BARIATRIC SURGERY: ICD-10-CM

## 2025-03-25 DIAGNOSIS — I10 ESSENTIAL HYPERTENSION: Primary | ICD-10-CM

## 2025-03-25 DIAGNOSIS — R71.8 HIGH HEMATOCRIT: ICD-10-CM

## 2025-03-25 DIAGNOSIS — R91.8 LUNG NODULES: ICD-10-CM

## 2025-03-25 DIAGNOSIS — R91.1 SOLITARY PULMONARY NODULE: ICD-10-CM

## 2025-03-25 DIAGNOSIS — E11.65 TYPE 2 DIABETES MELLITUS WITH HYPERGLYCEMIA, WITHOUT LONG-TERM CURRENT USE OF INSULIN: ICD-10-CM

## 2025-03-25 LAB — VIT B1 BLD-MCNC: 122 UG/L (ref 38–122)

## 2025-03-25 PROCEDURE — 3066F NEPHROPATHY DOC TX: CPT | Mod: CPTII,S$GLB,COE, | Performed by: STUDENT IN AN ORGANIZED HEALTH CARE EDUCATION/TRAINING PROGRAM

## 2025-03-25 PROCEDURE — 1160F RVW MEDS BY RX/DR IN RCRD: CPT | Mod: CPTII,S$GLB,COE, | Performed by: STUDENT IN AN ORGANIZED HEALTH CARE EDUCATION/TRAINING PROGRAM

## 2025-03-25 PROCEDURE — 3044F HG A1C LEVEL LT 7.0%: CPT | Mod: CPTII,S$GLB,COE, | Performed by: STUDENT IN AN ORGANIZED HEALTH CARE EDUCATION/TRAINING PROGRAM

## 2025-03-25 PROCEDURE — 1159F MED LIST DOCD IN RCRD: CPT | Mod: CPTII,S$GLB,COE, | Performed by: STUDENT IN AN ORGANIZED HEALTH CARE EDUCATION/TRAINING PROGRAM

## 2025-03-25 PROCEDURE — 99999 PR PBB SHADOW E&M-EST. PATIENT-LVL IV: CPT | Mod: PBBFAC,COE,, | Performed by: STUDENT IN AN ORGANIZED HEALTH CARE EDUCATION/TRAINING PROGRAM

## 2025-03-25 PROCEDURE — 3008F BODY MASS INDEX DOCD: CPT | Mod: CPTII,S$GLB,COE, | Performed by: STUDENT IN AN ORGANIZED HEALTH CARE EDUCATION/TRAINING PROGRAM

## 2025-03-25 PROCEDURE — 71271 CT THORAX LUNG CANCER SCR C-: CPT | Mod: 26,,, | Performed by: RADIOLOGY

## 2025-03-25 PROCEDURE — 71271 CT THORAX LUNG CANCER SCR C-: CPT | Mod: TC

## 2025-03-25 PROCEDURE — 3074F SYST BP LT 130 MM HG: CPT | Mod: CPTII,S$GLB,COE, | Performed by: STUDENT IN AN ORGANIZED HEALTH CARE EDUCATION/TRAINING PROGRAM

## 2025-03-25 PROCEDURE — 99214 OFFICE O/P EST MOD 30 MIN: CPT | Mod: S$GLB,COE,, | Performed by: STUDENT IN AN ORGANIZED HEALTH CARE EDUCATION/TRAINING PROGRAM

## 2025-03-25 PROCEDURE — 3060F POS MICROALBUMINURIA REV: CPT | Mod: CPTII,S$GLB,COE, | Performed by: STUDENT IN AN ORGANIZED HEALTH CARE EDUCATION/TRAINING PROGRAM

## 2025-03-25 PROCEDURE — G2211 COMPLEX E/M VISIT ADD ON: HCPCS | Mod: S$GLB,COE,, | Performed by: STUDENT IN AN ORGANIZED HEALTH CARE EDUCATION/TRAINING PROGRAM

## 2025-03-25 PROCEDURE — 3078F DIAST BP <80 MM HG: CPT | Mod: CPTII,S$GLB,COE, | Performed by: STUDENT IN AN ORGANIZED HEALTH CARE EDUCATION/TRAINING PROGRAM

## 2025-03-25 PROCEDURE — 4010F ACE/ARB THERAPY RXD/TAKEN: CPT | Mod: CPTII,S$GLB,COE, | Performed by: STUDENT IN AN ORGANIZED HEALTH CARE EDUCATION/TRAINING PROGRAM

## 2025-03-25 RX ORDER — LISINOPRIL 5 MG/1
5 TABLET ORAL DAILY
Qty: 90 TABLET | Refills: 3 | Status: SHIPPED | OUTPATIENT
Start: 2025-03-25 | End: 2026-03-25

## 2025-03-25 RX ORDER — ASPIRIN 81 MG/1
81 TABLET ORAL DAILY
COMMUNITY

## 2025-03-25 NOTE — PROGRESS NOTES
[facility]   OFFICE VISIT NOTE    Patient Name: Gayatri Dela Cruz  YOB: 1970    PRESENTING HISTORY     History of Present Illness:  Ms. Gayatri Dela Cruz is a 54 y.o. female     Cholesterol LDL 17.6  BUN high - need more water   CBC - hematocrit -- high; donate blood. Increase hydration   A1c improving at 5.8     History of Present Illness    CHIEF COMPLAINT:  Patient presents today for follow up of dizziness and medication management    BLOOD PRESSURE MANAGEMENT:  She reports experiencing dizziness associated with blood pressure medication. Her home blood pressure readings are not consistently low but do occasionally drop. She denies dehydration as a cause and has independently reduced her Lisinopril dose to 20 mg daily     POST-BARIATRIC SURGERY STATUS:  She underwent bariatric surgery on January 30th. Her weight decreased from 270 lbs at initial surgical consultation (January 8th) to 255 lbs at time of surgery, and currently weighs 227 lbs. She follows post-surgical dietary protocol, which included a strict liquid diet for first four weeks. Her current dietary regimen prioritizes protein intake, maintaining over 100 g daily. She tolerates chicken well but finds tuna too dry despite various preparation methods. She avoids foods with skin.    DIABETES:  She reports significant improvement in blood sugar control with A1C decreasing from 8.6% to 5.8%. Daily glucose readings range between  mg/dL, with recent morning readings of 123 mg/dL, 114 mg/dL, and 117 mg/dL on consecutive days.    MUSCULOSKELETAL:  She reports severe shoulder pain with an upcoming specialist appointment scheduled for the 31st.    PAST MEDICAL HISTORY:  History of uterine cancer. Former smoker, quit in 2013.    CURRENT MEDICATIONS:  Medications include Lisinopril 20mg (dose being adjusted), Jardiance (for kidney protection due to proteinuria), and Januvia.     Review of Systems  "  Constitutional:  Negative for activity change and unexpected weight change.   HENT:  Negative for hearing loss, rhinorrhea and trouble swallowing.    Eyes:  Negative for discharge and visual disturbance.   Respiratory:  Negative for chest tightness and wheezing.    Cardiovascular:  Negative for chest pain and palpitations.   Gastrointestinal:  Negative for blood in stool, constipation, diarrhea and vomiting.   Endocrine: Negative for polydipsia and polyuria.   Genitourinary:  Negative for difficulty urinating, dysuria, hematuria and menstrual problem.   Musculoskeletal:  Negative for arthralgias, joint swelling and neck pain.   Neurological:  Negative for weakness and headaches.   Psychiatric/Behavioral:  Negative for confusion and dysphoric mood.           OBJECTIVE:   Vital Signs:  Vitals:    03/25/25 1551 03/25/25 1615   BP: (!) 90/56 (!) 88/56   Pulse: 102    SpO2: 98%    Weight: 103.1 kg (227 lb 4.7 oz)    Height: 5' 6.5" (1.689 m)            Physical Exam  Constitutional:       General: She is not in acute distress.     Appearance: She is obese. She is not ill-appearing or toxic-appearing.   HENT:      Head: Normocephalic and atraumatic.      Mouth/Throat:      Mouth: Mucous membranes are moist.      Pharynx: Uvula midline. No pharyngeal swelling.   Cardiovascular:      Rate and Rhythm: Normal rate and regular rhythm.   Pulmonary:      Effort: Pulmonary effort is normal. No tachypnea, bradypnea, accessory muscle usage, prolonged expiration or respiratory distress.      Breath sounds: Normal breath sounds. No stridor. No wheezing, rhonchi or rales.   Abdominal:      General: Bowel sounds are normal. There is no distension.      Palpations: Abdomen is soft.      Tenderness: There is no abdominal tenderness. There is no guarding or rebound.   Neurological:      General: No focal deficit present.      Mental Status: She is alert.   Psychiatric:         Mood and Affect: Mood normal.         Behavior: Behavior " normal.         ASSESSMENT & PLAN:     Essential hypertension  -     lisinopriL (PRINIVIL,ZESTRIL) 5 MG tablet; Take 1 tablet (5 mg total) by mouth once daily.  Dispense: 90 tablet; Refill: 3  - Assessed that low blood pressure is likely contributing to the patient's dizziness.  - Decreased lisinopril dosage from 20 mg to 5 mg daily as current dose was too strong.  - Instructed the patient to monitor blood pressure at home and report if feeling dizzy or if readings are consistently low.    High hematocrit  -     CBC Auto Differential; Future; Expected date: 04/25/2025  - Noted elevated hematocrit, indicating thicker blood.  - Educated the patient on the potential need for blood donation if hematocrit remains elevated.  - Ordered lab work in 1 month to recheck blood count.  - Advised increased water intake to address elevated hematocrit levels.    Solitary pulmonary nodule  -     CT Chest Lung Screening Low Dose; Future; Expected date: 03/25/2025    BMI 36.0-36.9,adult  S/P bariatric surgery  - Reviewed weight loss progress post-bariatric surgery, noting significant weight loss from 270 lbs on January 8th to 227 lbs today.  - Examined the patient's abdomen, noting the presence of a surgical scar.  - Discussed the importance of proper hydration post-bariatric surgery, emphasizing frequent sips rather than gulps.  - Confirmed that the patient is following post-surgery dietary guidelines, focusing on protein intake and adjusting to new eating habits.  - Patient to continue focus on protein intake while ensuring adequate hydration.    Type 2 diabetes   - Noted significant improvement in diabetes management, with blood sugar decreasing from 8.6 to 5.8.  - Continued Jardiance and Januvia for diabetes management.  - Acknowledged the improvement in diabetes control and discussed the benefits of current medication.    SHOULDER PAIN:  - Noted the patient's report of severe shoulder pain.  - Confirmed the patient's appointment  with a specialist on the 31st to address the shoulder pain.    KIDNEY DISORDER AND PROTEINURIA:  - Identified elevated BUN levels, suggesting dehydration.  - Advised the patient to increase fluid intake to address dehydration and potentially improve BUN levels.  - Continued Jardiance for kidney protection due to proteinuria.  - Noted that protein levels in urine are decreasing from 121 two years ago.  - Will continue monitoring protein levels in urine.    HISTORY OF UTERINE CANCER:  - Noted the patient's history of uterine cancer.  - Confirmed that the patient undergoes regular check-ups for uterine cancer history.    HISTORY OF NICOTINE DEPENDENCE:  - Noted that the patient quit smoking in 2013, approximately 10 years ago.    FOLLOW-UP AND ADDITIONAL TESTS:  - Scheduled a follow-up visit in 3 months for blood pressure check and routine evaluation.  - Ordered CT Abdomen (low dose).     Chelsie Temple MD  Family Medicine  [facility]     This note was created using [email or web address] voice recognition software that occasionally misinterprets phrases or words

## 2025-03-28 ENCOUNTER — OFFICE VISIT (OUTPATIENT)
Dept: ORTHOPEDICS | Facility: CLINIC | Age: 55
End: 2025-03-28
Payer: COMMERCIAL

## 2025-03-28 ENCOUNTER — HOSPITAL ENCOUNTER (OUTPATIENT)
Dept: RADIOLOGY | Facility: HOSPITAL | Age: 55
Discharge: HOME | End: 2025-03-28
Attending: ORTHOPAEDIC SURGERY
Payer: COMMERCIAL

## 2025-03-28 VITALS — BODY MASS INDEX: 35.68 KG/M2 | WEIGHT: 227.31 LBS | HEIGHT: 67 IN

## 2025-03-28 DIAGNOSIS — S82.64XA NONDISPLACED FRACTURE OF LATERAL MALLEOLUS OF RIGHT FIBULA, INITIAL ENCOUNTER FOR CLOSED FRACTURE: ICD-10-CM

## 2025-03-28 DIAGNOSIS — S82.64XA NONDISPLACED FRACTURE OF LATERAL MALLEOLUS OF RIGHT FIBULA, INITIAL ENCOUNTER FOR CLOSED FRACTURE: Primary | ICD-10-CM

## 2025-03-28 PROCEDURE — 99999 PR PBB SHADOW E&M-EST. PATIENT-LVL III: CPT | Mod: PBBFAC,COE,, | Performed by: ORTHOPAEDIC SURGERY

## 2025-03-28 PROCEDURE — 73610 X-RAY EXAM OF ANKLE: CPT | Mod: TC,PO,RT

## 2025-03-28 PROCEDURE — 73610 X-RAY EXAM OF ANKLE: CPT | Mod: 26,RT,COE, | Performed by: RADIOLOGY

## 2025-03-28 NOTE — LETTER
March 28, 2025      [facility]  [address] [place] [address] [address] [place]RO[place]O [address]  Phone: [phone]       Patient: Gayatri Dela Cruz   YOB: 1970  Date of Visit: 03/28/2025    To Whom It May Concern:    Jacey Dela Cruz  was at [facility] on 03/28/2025. The patient may return to work on 03/29/2025 full duty with no restrictions. If you have any questions or concerns, or if I can be of further assistance, please do not hesitate to contact me.    Sincerely,    Neftaly Mc M.D.

## 2025-03-28 NOTE — PROGRESS NOTES
Status/Diagnosis: Nondisplaed Right lateral malleolus fracture, Oneill A.  Date of Surgery: none  Date of Injury: 12/22/2024  Return visit: 8 weeks  X-rays on Return: WB 3-views Right ankle    Chief Complaint:   Chief Complaint   Patient presents with    Right Ankle - Pain     Present History:  WORK COMP CASE  Patient presents today via referral from [facility]- *   Gayatri presents for follow-up after falling in the [facility] parking lot on December 22nd, approximately three weeks ago. She reports going to urgent care or the emergency room immediately after the incident, where she was diagnosed with a hairline fracture. She was provided with a short CAM boot and crutches for treatment. Gayatri mentions difficulty using crutches and has been using a knee scooter for longer distances. She notes improved range of motion in the affected area since time of injury.    She mentions having mild neuropathy on the bottom of her foot. Gayatri describes her work at [employer], stating she does a significant amount of walking during shifts, approximately 18,000 to 20,000 steps. She expresses concern about standing for long periods at work.    The fracture is identified as being on the fibula bone, specifically on the outside of the foot. Gayatri has been managing her condition at home, mentioning she does not wear the boot at home and can walk short distances without it.    PMH significant for type 2 diabetes, A1c of 8.5; diabetic neuropathy; hypertension; and hyperlipidemia.  Denies tobacco use.    02/14/2025  She is here for follow up evaluation of right ankle fracture.  Overall she is doing very well and has no complaints of pain.  She has been compliant with boot wear.  She was placed on light duty initially but has recently been out of work due to unrelated bariatric surgery.  The patient, plans to return to work in 4 weeks' time.    03/28/2025:  Patient returns today for repeat clinical evaluation and x-ray.   Overall doing well.  0/10 pain.  Has been wearing the lace-up ankle brace for the most part but has been taking it off while walking around the house.  No new injuries.  Denies any numbness or tingling.  She remains at light duty while at work.      Past Medical History:   Diagnosis Date    Arthritis 2021    COVID-19     Dyslipidemia     Encounter for blood transfusion 03/2019    Endometrial cancer 03/19/2019    Essential (primary) hypertension     Hiatal hernia 10/04/2022    Hyperlipidemia 2019    Morbid obesity with BMI of 45.0-49.9, adult     Simple ovarian cyst 03/19/2019    Type 2 diabetes mellitus with diabetic polyneuropathy        Past Surgical History:   Procedure Laterality Date    INJECTION OF ANESTHETIC AGENT INTO TISSUE PLANE DEFINED BY TRANSVERSUS ABDOMINIS MUSCLE  1/30/2025    Procedure: BLOCK, TRANSVERSUS ABDOMINIS PLANE;  Surgeon: Gely Dawson MD;  Location: [facility] OR [address] [place] [address];  Service: General;;    LAPAROSCOPIC GASTROENTEROSTOMY N/A 1/30/2025    Procedure: GASTROENTEROSTOMY, LAPAROSCOPIC W/ INTRAOP EGD;  Surgeon: Gely Dawson MD;  Location: [facility] OR [address] [place] [address];  Service: General;  Laterality: N/A;  ABDON PT WAL#  [id number]  BMI 42.97  SURGEON TO DO TAP BLOCK INTRAOP    LAPAROSCOPY-ASSISTED VAGINAL HYSTERECTOMY  04/01/2019    robotic       Current Outpatient Medications   Medication Sig    aspirin (ECOTRIN) 81 MG EC tablet Take 81 mg by mouth once daily.    cyanocobalamin (VITAMIN B-12) 100 MCG tablet Take 5,000 mcg by mouth. 2 Times in a week    fish oil-omega-3 fatty acids 300-1,000 mg capsule Take by mouth 2 (two) times daily.    gabapentin (NEURONTIN) 400 MG capsule Take 2 capsules (800 mg total) by mouth every evening.    JANUVIA 50 mg Tab 50 mg once daily.    JARDIANCE 10 mg tablet Take 10 mg by mouth once daily.    lisinopriL (PRINIVIL,ZESTRIL) 5 MG tablet Take 1 tablet (5 mg total) by mouth once daily.    melatonin 10 mg Chew Take by mouth. Nightly    metFORMIN  (GLUCOPHAGE) 1000 MG tablet Take 1,000 mg by mouth 2 (two) times daily. Taking time release    multivitamin (THERAGRAN) per tablet Take 1 tablet by mouth once daily.    omeprazole (PRILOSEC) 40 MG capsule Take 1 capsule (40 mg total) by mouth every morning. Open capsule and sprinkle granules for 2 weeks post-op    ondansetron (ZOFRAN-ODT) 8 MG TbDL DISSOLVE 1 tablet (8 mg total) by mouth every 6 (six) hours as needed (nausea).    pioglitazone (ACTOS) 30 MG tablet Take 30 mg by mouth once daily.    rosuvastatin (CRESTOR) 20 MG tablet Take 20 mg by mouth once daily.    ursodioL (ZANE FORTE) 500 MG tablet Take 1 tablet (500 mg total) by mouth once daily. for 14 days     No current facility-administered medications for this visit.       Review of patient's allergies indicates:  No Known Allergies    Family History   Problem Relation Name Age of Onset    Mental illness Mother Kaylynn Torres     Miscarriages / Stillbirths Mother Kaylynn Torres     Obesity Mother Kaylynn Torres     Alcohol abuse Father Trevor Torres     Arthritis Father Trevor Torres     Vision loss Father Trevor Torres     Heart disease Father Trevor Torres     Pulmonary fibrosis Father Trevor Torres        Social History     Socioeconomic History    Marital status:    Tobacco Use    Smoking status: Former     Current packs/day: 0.00     Average packs/day: 0.7 packs/day for 43.2 years (29.1 ttl pk-yrs)     Types: Cigarettes     Start date: 1988     Quit date: 3/20/2016     Years since quittin.0     Passive exposure: Past    Smokeless tobacco: Never    Tobacco comments:     Quit tobacco smoking in    Substance and Sexual Activity    Alcohol use: Not Currently     Comment: social    Drug use: Not Currently     Types: Marijuana     Comment: rarely    Sexual activity: Yes     Partners: Male     Birth control/protection: Post-menopausal, See Surgical Hx, None     Social Drivers of Health     Financial Resource Strain: Low Risk  (2025)     Overall Financial Resource Strain (CARDIA)     Difficulty of Paying Living Expenses: Not hard at all   Food Insecurity: No Food Insecurity (2/12/2025)    Hunger Vital Sign     Worried About Running Out of Food in the Last Year: Never true     Ran Out of Food in the Last Year: Never true   Transportation Needs: No Transportation Needs (2/12/2025)    PRAPARE - Transportation     Lack of Transportation (Medical): No     Lack of Transportation (Non-Medical): No   Physical Activity: Insufficiently Active (2/12/2025)    Exercise Vital Sign     Days of Exercise per Week: 3 days     Minutes of Exercise per Session: 20 min   Stress: No Stress Concern Present (2/12/2025)    [employer] - Occupational Stress Questionnaire     Feeling of Stress : Not at all   Housing Stability: Low Risk  (2/12/2025)    Housing Stability Vital Sign     Unable to Pay for Housing in the Last Year: No     Number of Times Moved in the Last Year: 0     Homeless in the Last Year: No       Physical exam:  There were no vitals filed for this visit.  Body mass index is 36.14 kg/m².  General: In no apparent distress; well developed and well nourished.  HEENT: normocephalic; atraumatic.  Cardiovascular: regular rate.  Respiratory: no increased work of breathing.  Musculoskeletal:   Gait: nonantalgic  Inspection:   Mild residual lateral ankle swelling but improved.  No tenderness over lateral malleolus on deep palpation.  Silf[place]kiold:  Deferred  Alignment:  Knee: neutral               Ankle: neutral              Hindfoot: neutral              Forefoot: neutral   Strength:              Dorsiflexion 5/5  Plantar flexion 5/5  Inversion 5/5  Eversion 5/5  Sensation:              Good sensation on monofilament testing  ROM:              Ankle: Near full without pain.  Pulses: Palpable pedal pulse                   Imaging Studies/Outside documentation:  I have ordered/reviewed/interpreted the following images/outside  documentation:  1. Weight-bearing 3-views of Right ankle:   On my independent review, previously noted nondisplaced distal fibular tip avulsion fracture, Oneill a.  Fracture line still visible with questionable bony bridging.  Findings more consistent with fibrous union.   Diffuse arthritic changes throughout the midfoot with concomitant dorsal osteophyte formation.  Large enthesophyte at the Achilles insertion and plantar fascia origin with adjacent Corin deformity.  Ankle mortise remains congruent.        Assessment:  WORK COMP CASE  Gyaatri Dela Cruz is a 54 y.o. female with Nondisplaced Right lateral malleolus fracture, Oneill A.     Plan:   Clinical and radiographic findings were discussed, specifically discussed potential for fibrous union.  Clinically she is doing extremely well.  We will allow her to return back to work at full duty.    I did recommend she wear the lace-up ankle brace while at work but okay to remove while around the house.  Still no high impact activities.  Patient voiced understanding.  All questions were answered.  Follow up in 8 weeks for repeat evaluation and x-ray.        This note was created using voice recognition software and may contain grammatical errors.

## 2025-03-31 ENCOUNTER — HOSPITAL ENCOUNTER (OUTPATIENT)
Dept: RADIOLOGY | Facility: HOSPITAL | Age: 55
Discharge: HOME | End: 2025-03-31
Attending: ORTHOPAEDIC SURGERY
Payer: COMMERCIAL

## 2025-03-31 ENCOUNTER — OFFICE VISIT (OUTPATIENT)
Dept: ORTHOPEDICS | Facility: CLINIC | Age: 55
End: 2025-03-31
Payer: COMMERCIAL

## 2025-03-31 VITALS — HEIGHT: 66 IN | BODY MASS INDEX: 36.53 KG/M2 | WEIGHT: 227.31 LBS

## 2025-03-31 DIAGNOSIS — M75.101 TEAR OF RIGHT ROTATOR CUFF, UNSPECIFIED TEAR EXTENT, UNSPECIFIED WHETHER TRAUMATIC: Primary | ICD-10-CM

## 2025-03-31 DIAGNOSIS — M25.511 RIGHT SHOULDER PAIN, UNSPECIFIED CHRONICITY: ICD-10-CM

## 2025-03-31 DIAGNOSIS — M75.100 ROTATOR CUFF SYNDROME, UNSPECIFIED LATERALITY: Primary | ICD-10-CM

## 2025-03-31 PROCEDURE — 1160F RVW MEDS BY RX/DR IN RCRD: CPT | Mod: CPTII,S$GLB,COE, | Performed by: ORTHOPAEDIC SURGERY

## 2025-03-31 PROCEDURE — 3060F POS MICROALBUMINURIA REV: CPT | Mod: CPTII,S$GLB,COE, | Performed by: ORTHOPAEDIC SURGERY

## 2025-03-31 PROCEDURE — 99999 PR PBB SHADOW E&M-EST. PATIENT-LVL III: CPT | Mod: PBBFAC,COE,, | Performed by: ORTHOPAEDIC SURGERY

## 2025-03-31 PROCEDURE — 4010F ACE/ARB THERAPY RXD/TAKEN: CPT | Mod: CPTII,S$GLB,COE, | Performed by: ORTHOPAEDIC SURGERY

## 2025-03-31 PROCEDURE — 73030 X-RAY EXAM OF SHOULDER: CPT | Mod: TC,PO,RT

## 2025-03-31 PROCEDURE — 1159F MED LIST DOCD IN RCRD: CPT | Mod: CPTII,S$GLB,COE, | Performed by: ORTHOPAEDIC SURGERY

## 2025-03-31 PROCEDURE — 3066F NEPHROPATHY DOC TX: CPT | Mod: CPTII,S$GLB,COE, | Performed by: ORTHOPAEDIC SURGERY

## 2025-03-31 PROCEDURE — 3008F BODY MASS INDEX DOCD: CPT | Mod: CPTII,S$GLB,COE, | Performed by: ORTHOPAEDIC SURGERY

## 2025-03-31 PROCEDURE — 99204 OFFICE O/P NEW MOD 45 MIN: CPT | Mod: S$GLB,COE,, | Performed by: ORTHOPAEDIC SURGERY

## 2025-03-31 PROCEDURE — 3044F HG A1C LEVEL LT 7.0%: CPT | Mod: CPTII,S$GLB,COE, | Performed by: ORTHOPAEDIC SURGERY

## 2025-03-31 PROCEDURE — 73030 X-RAY EXAM OF SHOULDER: CPT | Mod: 26,RT,COE, | Performed by: RADIOLOGY

## 2025-03-31 RX ORDER — DICLOFENAC SODIUM 10 MG/G
2 GEL TOPICAL 4 TIMES DAILY
Qty: 50 G | Refills: 1 | Status: SHIPPED | OUTPATIENT
Start: 2025-03-31

## 2025-03-31 NOTE — PROGRESS NOTES
[facility] [place]   OFFICE VISIT NOTE    Patient Name: Gayatri Dela Cruz  YOB: 1970    PRESENTING HISTORY     History of Present Illness:  Ms. Gayatri Dela Cruz is a 54 y.o. female     History of Present Illness    CHIEF COMPLAINT:  Patient presents today for back pain affecting her ability to work    BACK PAIN:  She reports pain affecting the entire back, including both upper and lower regions. Pain is exacerbated by standing, walking, and sitting. Multiple treatments have been attempted without relief, including lidocaine patches, aspirin cream with and without lidocaine, and cyclobenzaprine. She denies neurological symptoms including leg weakness, bowel/bladder incontinence, or perineal numbness.    OCCUPATIONAL HISTORY:  She works 8-hour shifts with two-hour breaks, with job responsibilities including lifting up to 50 lbs. She recently returned to full duty after being on light duty for three months    Answers submitted by the patient for this visit:  Back Pain Questionnaire (Submitted on 3/31/2025)  Chief Complaint: Back pain  Chronicity: new  Onset: yesterday  Frequency: daily  Progression since onset: gradually worsening  Pain location: lumbar spine, costovertebral angle  Pain quality: aching, cramping  Radiates to: does not radiate  Pain - numeric: 10/10  Pain is: worse during the day  Aggravated by: bending, position, sitting, standing  Stiffness is present: in the morning, all day  bowel incontinence: No  paresis: No  paresthesias: No  perianal numbness: No  tingling: No  weight loss: No  genital pain: No  Risk factors: history of cancer, lack of exercise, obesity  Pain severity: severe  Improvement on treatment: no relief    Review of Systems   Constitutional:  Negative for fever.   Cardiovascular:  Negative for chest pain.   Gastrointestinal:  Negative for abdominal pain and fecal incontinence.   Genitourinary:  Negative for bladder incontinence, difficulty  "urinating, dysuria, hematuria and pelvic pain.   Musculoskeletal:  Positive for back pain. Negative for gait problem, leg pain and neck pain.   Neurological:  Negative for weakness, numbness and headaches.          OBJECTIVE:   Vital Signs:  Vitals:    04/01/25 0850   BP: (!) 98/58   Pulse: 104   SpO2: 98%   Weight: 100.7 kg (222 lb 0.1 oz)   Height: 5' 6" (1.676 m)           Physical Exam  Constitutional:       General: She is not in acute distress.     Appearance: She is obese. She is not ill-appearing or toxic-appearing.   HENT:      Head: Normocephalic and atraumatic.      Mouth/Throat:      Mouth: Mucous membranes are moist.      Pharynx: Uvula midline. No pharyngeal swelling.   Cardiovascular:      Rate and Rhythm: Normal rate and regular rhythm.   Pulmonary:      Effort: Pulmonary effort is normal. No tachypnea, bradypnea, accessory muscle usage, prolonged expiration or respiratory distress.      Breath sounds: Normal breath sounds. No stridor. No wheezing, rhonchi or rales.   Abdominal:      General: Bowel sounds are normal. There is no distension.      Tenderness: There is no abdominal tenderness. There is no guarding or rebound.   Musculoskeletal:      Cervical back: No bony tenderness.      Thoracic back: No bony tenderness.      Lumbar back: No bony tenderness.   Neurological:      General: No focal deficit present.      Mental Status: She is alert.      Deep Tendon Reflexes:      Reflex Scores:       Patellar reflexes are 2+ on the right side and 1+ on the left side.  Psychiatric:         Mood and Affect: Mood normal.         Behavior: Behavior normal.         ASSESSMENT & PLAN:     Acute midline low back pain without sciatica  -     cyclobenzaprine (FLEXERIL) 5 MG tablet; Take 1 tablet (5 mg total) by mouth nightly as needed for Muscle spasms.  Dispense: 20 tablet; Refill: 0  -     traMADoL (ULTRAM) 50 mg tablet; Take 1 tablet (50 mg total) by mouth every 24 hours as needed for Pain.  Dispense: 7 each; " Refill: 0  - Assessed the patient's back pain, likely due to muscle strain from returning to work after a period of inactivity.  - Patient reports severe back pain affecting the whole back, particularly when standing, walking, or sitting up, alternating between upper and lower back.  - Performed physical exam, including straight leg raise test and reflex test, with no pain reported during these tests.  - Evaluated the need for pain management and determined X-rays unnecessary based on clinical presentation.  - Recommend reducing work hours if pain becomes significant.  - Explained importance of continued movement to prevent muscle stiffness and pain.    MUSCLE SPASM:  - Patient reports severe muscle pain and spasms in the back, affecting work performance.  - Performed physical exam, including leg strength and reflex tests, with no specific muscle spasms noted during exam.  - Evaluated the need for muscle relaxants.  - Prescribed muscle relaxant to be taken at nighttime before bed.  - Cautioned the patient against driving or working after taking muscle relaxant due to potential drowsiness.  - Advised the patient to take muscle relaxants at night.  - Suggested potentially reducing work hours if pain persists.    Essential hypertension  - BP improving  - Continue with lisinopril 5 mg daily    Follow up in 3 months or sooner GALINA Temple MD  Family Medicine  [facility] [place]     This note was created using [employer] voice recognition software that occasionally misinterprets phrases or words

## 2025-03-31 NOTE — PROGRESS NOTES
Past Medical History:   Diagnosis Date    Arthritis 2021    COVID-19     Dyslipidemia     Encounter for blood transfusion 03/2019    Endometrial cancer 03/19/2019    Essential (primary) hypertension     Hiatal hernia 10/04/2022    Hyperlipidemia 2019    Morbid obesity with BMI of 45.0-49.9, adult     Simple ovarian cyst 03/19/2019    Type 2 diabetes mellitus with diabetic polyneuropathy        Past Surgical History:   Procedure Laterality Date    INJECTION OF ANESTHETIC AGENT INTO TISSUE PLANE DEFINED BY TRANSVERSUS ABDOMINIS MUSCLE  1/30/2025    Procedure: BLOCK, TRANSVERSUS ABDOMINIS PLANE;  Surgeon: Gely Dawson MD;  Location: [facility] OR [address] [place] [address];  Service: General;;    LAPAROSCOPIC GASTROENTEROSTOMY N/A 1/30/2025    Procedure: GASTROENTEROSTOMY, LAPAROSCOPIC W/ INTRAOP EGD;  Surgeon: Gely Dawson MD;  Location: [facility] OR [address] [place] [address];  Service: General;  Laterality: N/A;  ABDON PT WAL#  [id number]  BMI 42.97  SURGEON TO DO TAP BLOCK INTRAOP    LAPAROSCOPY-ASSISTED VAGINAL HYSTERECTOMY  04/01/2019    robotic       Current Outpatient Medications   Medication Sig    aspirin (ECOTRIN) 81 MG EC tablet Take 81 mg by mouth once daily.    cyanocobalamin (VITAMIN B-12) 100 MCG tablet Take 5,000 mcg by mouth. 2 Times in a week    diclofenac sodium (VOLTAREN ARTHRITIS PAIN) 1 % Gel Apply 2 g topically 4 (four) times daily.    fish oil-omega-3 fatty acids 300-1,000 mg capsule Take by mouth 2 (two) times daily.    gabapentin (NEURONTIN) 400 MG capsule Take 2 capsules (800 mg total) by mouth every evening.    JANUVIA 50 mg Tab 50 mg once daily.    JARDIANCE 10 mg tablet Take 10 mg by mouth once daily.    lisinopriL (PRINIVIL,ZESTRIL) 5 MG tablet Take 1 tablet (5 mg total) by mouth once daily.    melatonin 10 mg Chew Take by mouth. Nightly    metFORMIN (GLUCOPHAGE) 1000 MG tablet Take 1,000 mg by mouth 2 (two) times daily. Taking time release    multivitamin (THERAGRAN) per tablet Take 1 tablet by mouth once  daily.    omeprazole (PRILOSEC) 40 MG capsule Take 1 capsule (40 mg total) by mouth every morning. Open capsule and sprinkle granules for 2 weeks post-op    ondansetron (ZOFRAN-ODT) 8 MG TbDL DISSOLVE 1 tablet (8 mg total) by mouth every 6 (six) hours as needed (nausea).    pioglitazone (ACTOS) 30 MG tablet Take 30 mg by mouth once daily.    rosuvastatin (CRESTOR) 20 MG tablet Take 20 mg by mouth once daily.    ursodioL (ZANE FORTE) 500 MG tablet Take 1 tablet (500 mg total) by mouth once daily. for 14 days     No current facility-administered medications for this visit.       Review of patient's allergies indicates:  No Known Allergies    Family History   Problem Relation Name Age of Onset    Mental illness Mother Kaylynn Torres     Miscarriages / Stillbirths Mother Kaylynn Torres     Obesity Mother Kaylynn Torres     Alcohol abuse Father Trevor Torres     Arthritis Father Trevor Torres     Vision loss Father Trevor Torres     Heart disease Father Trevor Torres     Pulmonary fibrosis Father Trevor Torres        Social History[1]    Chief Complaint: No chief complaint on file.      History of present illness:  54-year-old right-hand-dominant female seen for years of worsening right shoulder pain.  Arm feels weak.  Can not raise her arm up over her head.  Hard to lift things now.  No previous treatment.  Pain is a 7/10.  There was no trauma or inciting event    Prior treatment:  None        Review of Systems:    Constitution: Negative for chills, fever, and sweats.  Negative for unexplained weight loss.    HENT:  Negative for headaches and blurry vision.    Cardiovascular:Negative for chest pain or irregular heart beat. Negative for hypertension.    Respiratory:  Negative for cough and shortness of breath.    Gastrointestinal: Negative for abdominal pain, heartburn, melena, nausea, and vomitting.    Genitourinary:  Negative bladder incontinence and dysuria.    Musculoskeletal:  See HPI    Neurological: Negative for  numbness.    Psychiatric/Behavioral: Negative for depression.  The patient is not nervous/anxious.      Endocrine: Negative for polyuria    Hematologic/Lymphatic: Negative for bleeding problem.  Does not bruise/bleed easily.    Skin: Negative for poor would healing and rash      Physical Examination:    Vital Signs:  There were no vitals filed for this visit.    There is no height or weight on file to calculate BMI.    This a well-developed, well nourished patient in no acute distress.  They are alert and oriented and cooperative to examination.  Pt. walks without an antalgic gait.      Examination of the right shoulder shows no rashes or erythema. There are no masses, ecchymosis, or atrophy. The patient has full range of motion in forward flexion, external rotation, and internal rotation to the mid T-spine. The patient has moderately positive Carter and Neer test.- [place]'s test. - Speeds test. Nontender to palpation over a.c. joint. Normal stability anteriorly, posteriorly, and negative sulcus sign. Passive range of motion: Forward flexion of 180°, external rotation at 90° of 90°, internal rotation of 50°, and external rotation at 0° of 50°. 2+ radial pulse. Intact axillary, radial, median and ulnar sensation.4 out of 5 resisted forward flexion, 5/5 external rotation, and negative lift off test.      X-rays:  X-rays of the right shoulder ordered and review which show some mild degenerative changes with well-maintained glenohumeral joint space.  Does have some calcifications around the rotator cuff insertions both anteriorly and posteriorly.         Assessment:  Possible right rotator cuff tear    Plan:  I reviewed the findings with her today.  Patient has had pain for quite some time.  Now has pain and weakness.  Recommended an MRI to evaluate the rotator cuff.  Follow up after the MRI is completed.            All previous pertinent notes including ER visits, physical therapy visits, other orthopedic visits as  well as other care for the same musculoskeletal problem were reviewed.  All pertinent lab values and previous imaging was reviewed pertinent to the current visit.    This note was created using [employer] voice recognition software that occasionally misinterpreted phrases or words.    Consult note is delivered via Epic messaging service.           [1]   Social History  Socioeconomic History    Marital status:    Tobacco Use    Smoking status: Former     Current packs/day: 0.00     Average packs/day: 0.7 packs/day for 43.2 years (29.1 ttl pk-yrs)     Types: Cigarettes     Start date: 1988     Quit date: 3/20/2016     Years since quittin.0     Passive exposure: Past    Smokeless tobacco: Never    Tobacco comments:     Quit tobacco smoking in    Substance and Sexual Activity    Alcohol use: Not Currently     Comment: social    Drug use: Not Currently     Types: Marijuana     Comment: rarely    Sexual activity: Yes     Partners: Male     Birth control/protection: Post-menopausal, See Surgical Hx, None     Social Drivers of Health     Financial Resource Strain: Low Risk  (2025)    Overall Financial Resource Strain (CARDIA)     Difficulty of Paying Living Expenses: Not hard at all   Food Insecurity: No Food Insecurity (2025)    Hunger Vital Sign     Worried About Running Out of Food in the Last Year: Never true     Ran Out of Food in the Last Year: Never true   Transportation Needs: No Transportation Needs (2025)    PRAPARE - Transportation     Lack of Transportation (Medical): No     Lack of Transportation (Non-Medical): No   Physical Activity: Insufficiently Active (2025)    Exercise Vital Sign     Days of Exercise per Week: 3 days     Minutes of Exercise per Session: 20 min   Stress: No Stress Concern Present (2025)    [employer] - Occupational Stress Questionnaire     Feeling of Stress : Not at all   Housing Stability: Low Risk  (2025)     Housing Stability Vital Sign     Unable to Pay for Housing in the Last Year: No     Number of Times Moved in the Last Year: 0     Homeless in the Last Year: No

## 2025-04-01 ENCOUNTER — OFFICE VISIT (OUTPATIENT)
Dept: FAMILY MEDICINE | Facility: CLINIC | Age: 55
End: 2025-04-01
Payer: COMMERCIAL

## 2025-04-01 VITALS
HEIGHT: 66 IN | HEART RATE: 104 BPM | OXYGEN SATURATION: 98 % | SYSTOLIC BLOOD PRESSURE: 98 MMHG | DIASTOLIC BLOOD PRESSURE: 58 MMHG | WEIGHT: 222 LBS | BODY MASS INDEX: 35.68 KG/M2

## 2025-04-01 DIAGNOSIS — M54.50 ACUTE MIDLINE LOW BACK PAIN WITHOUT SCIATICA: Primary | ICD-10-CM

## 2025-04-01 DIAGNOSIS — I10 ESSENTIAL HYPERTENSION: ICD-10-CM

## 2025-04-01 PROCEDURE — 3008F BODY MASS INDEX DOCD: CPT | Mod: CPTII,S$GLB,COE, | Performed by: STUDENT IN AN ORGANIZED HEALTH CARE EDUCATION/TRAINING PROGRAM

## 2025-04-01 PROCEDURE — 4010F ACE/ARB THERAPY RXD/TAKEN: CPT | Mod: CPTII,S$GLB,COE, | Performed by: STUDENT IN AN ORGANIZED HEALTH CARE EDUCATION/TRAINING PROGRAM

## 2025-04-01 PROCEDURE — 3060F POS MICROALBUMINURIA REV: CPT | Mod: CPTII,S$GLB,COE, | Performed by: STUDENT IN AN ORGANIZED HEALTH CARE EDUCATION/TRAINING PROGRAM

## 2025-04-01 PROCEDURE — G2211 COMPLEX E/M VISIT ADD ON: HCPCS | Mod: S$GLB,COE,, | Performed by: STUDENT IN AN ORGANIZED HEALTH CARE EDUCATION/TRAINING PROGRAM

## 2025-04-01 PROCEDURE — 99999 PR PBB SHADOW E&M-EST. PATIENT-LVL V: CPT | Mod: PBBFAC,COE,, | Performed by: STUDENT IN AN ORGANIZED HEALTH CARE EDUCATION/TRAINING PROGRAM

## 2025-04-01 PROCEDURE — 3078F DIAST BP <80 MM HG: CPT | Mod: CPTII,S$GLB,COE, | Performed by: STUDENT IN AN ORGANIZED HEALTH CARE EDUCATION/TRAINING PROGRAM

## 2025-04-01 PROCEDURE — 3066F NEPHROPATHY DOC TX: CPT | Mod: CPTII,S$GLB,COE, | Performed by: STUDENT IN AN ORGANIZED HEALTH CARE EDUCATION/TRAINING PROGRAM

## 2025-04-01 PROCEDURE — 1159F MED LIST DOCD IN RCRD: CPT | Mod: CPTII,S$GLB,COE, | Performed by: STUDENT IN AN ORGANIZED HEALTH CARE EDUCATION/TRAINING PROGRAM

## 2025-04-01 PROCEDURE — 3044F HG A1C LEVEL LT 7.0%: CPT | Mod: CPTII,S$GLB,COE, | Performed by: STUDENT IN AN ORGANIZED HEALTH CARE EDUCATION/TRAINING PROGRAM

## 2025-04-01 PROCEDURE — 3074F SYST BP LT 130 MM HG: CPT | Mod: CPTII,S$GLB,COE, | Performed by: STUDENT IN AN ORGANIZED HEALTH CARE EDUCATION/TRAINING PROGRAM

## 2025-04-01 PROCEDURE — 99213 OFFICE O/P EST LOW 20 MIN: CPT | Mod: S$GLB,COE,, | Performed by: STUDENT IN AN ORGANIZED HEALTH CARE EDUCATION/TRAINING PROGRAM

## 2025-04-01 PROCEDURE — 1160F RVW MEDS BY RX/DR IN RCRD: CPT | Mod: CPTII,S$GLB,COE, | Performed by: STUDENT IN AN ORGANIZED HEALTH CARE EDUCATION/TRAINING PROGRAM

## 2025-04-01 RX ORDER — TRAMADOL HYDROCHLORIDE 50 MG/1
50 TABLET ORAL
Qty: 7 EACH | Refills: 0 | Status: SHIPPED | OUTPATIENT
Start: 2025-04-01 | End: 2025-04-08

## 2025-04-01 RX ORDER — CYCLOBENZAPRINE HCL 5 MG
5 TABLET ORAL NIGHTLY PRN
Qty: 20 TABLET | Refills: 0 | Status: SHIPPED | OUTPATIENT
Start: 2025-04-01

## 2025-04-02 ENCOUNTER — RESULTS FOLLOW-UP (OUTPATIENT)
Dept: ORTHOPEDICS | Facility: CLINIC | Age: 55
End: 2025-04-02

## 2025-04-02 ENCOUNTER — HOSPITAL ENCOUNTER (OUTPATIENT)
Dept: RADIOLOGY | Facility: HOSPITAL | Age: 55
Discharge: HOME | End: 2025-04-02
Attending: ORTHOPAEDIC SURGERY
Payer: COMMERCIAL

## 2025-04-02 DIAGNOSIS — M75.101 TEAR OF RIGHT ROTATOR CUFF, UNSPECIFIED TEAR EXTENT, UNSPECIFIED WHETHER TRAUMATIC: ICD-10-CM

## 2025-04-02 PROCEDURE — 73221 MRI JOINT UPR EXTREM W/O DYE: CPT | Mod: 26,RT,, | Performed by: RADIOLOGY

## 2025-04-02 PROCEDURE — 73221 MRI JOINT UPR EXTREM W/O DYE: CPT | Mod: TC,RT

## 2025-04-03 ENCOUNTER — OFFICE VISIT (OUTPATIENT)
Dept: ORTHOPEDICS | Facility: CLINIC | Age: 55
End: 2025-04-03
Payer: COMMERCIAL

## 2025-04-03 DIAGNOSIS — M75.100 ROTATOR CUFF SYNDROME, UNSPECIFIED LATERALITY: Primary | ICD-10-CM

## 2025-04-03 PROCEDURE — 99999 PR PBB SHADOW E&M-EST. PATIENT-LVL II: CPT | Mod: PBBFAC,COE,, | Performed by: ORTHOPAEDIC SURGERY

## 2025-04-03 RX ORDER — TRIAMCINOLONE ACETONIDE 40 MG/ML
40 INJECTION, SUSPENSION INTRA-ARTICULAR; INTRAMUSCULAR
Status: DISCONTINUED | OUTPATIENT
Start: 2025-04-03 | End: 2025-04-03 | Stop reason: HOSPADM

## 2025-04-03 RX ADMIN — TRIAMCINOLONE ACETONIDE 40 MG: 40 INJECTION, SUSPENSION INTRA-ARTICULAR; INTRAMUSCULAR at 03:04

## 2025-04-03 NOTE — PROGRESS NOTES
Past Medical History:   Diagnosis Date    Arthritis 2021    COVID-19     Dyslipidemia     Encounter for blood transfusion 03/2019    Endometrial cancer 03/19/2019    Essential (primary) hypertension     Hiatal hernia 10/04/2022    Hyperlipidemia 2019    Morbid obesity with BMI of 45.0-49.9, adult     Simple ovarian cyst 03/19/2019    Type 2 diabetes mellitus with diabetic polyneuropathy        Past Surgical History:   Procedure Laterality Date    INJECTION OF ANESTHETIC AGENT INTO TISSUE PLANE DEFINED BY TRANSVERSUS ABDOMINIS MUSCLE  1/30/2025    Procedure: BLOCK, TRANSVERSUS ABDOMINIS PLANE;  Surgeon: Gely Dawson MD;  Location: [facility] OR [address] [place] [address];  Service: General;;    LAPAROSCOPIC GASTROENTEROSTOMY N/A 1/30/2025    Procedure: GASTROENTEROSTOMY, LAPAROSCOPIC W/ INTRAOP EGD;  Surgeon: Gely Dawson MD;  Location: [facility] OR [address] [place] [address];  Service: General;  Laterality: N/A;  ABDON PT WAL#  [id number]  BMI 42.97  SURGEON TO DO TAP BLOCK INTRAOP    LAPAROSCOPY-ASSISTED VAGINAL HYSTERECTOMY  04/01/2019    robotic       Current Outpatient Medications   Medication Sig    aspirin (ECOTRIN) 81 MG EC tablet Take 81 mg by mouth once daily.    cyanocobalamin (VITAMIN B-12) 100 MCG tablet Take 5,000 mcg by mouth. 2 Times in a week    cyclobenzaprine (FLEXERIL) 5 MG tablet Take 1 tablet (5 mg total) by mouth nightly as needed for Muscle spasms.    diclofenac sodium (VOLTAREN ARTHRITIS PAIN) 1 % Gel Apply 2 g topically 4 (four) times daily.    fish oil-omega-3 fatty acids 300-1,000 mg capsule Take by mouth 2 (two) times daily.    gabapentin (NEURONTIN) 400 MG capsule Take 2 capsules (800 mg total) by mouth every evening.    JANUVIA 50 mg Tab 50 mg once daily.    JARDIANCE 10 mg tablet Take 10 mg by mouth once daily.    lisinopriL (PRINIVIL,ZESTRIL) 5 MG tablet Take 1 tablet (5 mg total) by mouth once daily.    melatonin 10 mg Chew Take by mouth. Nightly    metFORMIN (GLUCOPHAGE) 1000 MG tablet Take 1,000 mg by  mouth 2 (two) times daily. Taking time release    multivitamin (THERAGRAN) per tablet Take 1 tablet by mouth once daily.    omeprazole (PRILOSEC) 40 MG capsule Take 1 capsule (40 mg total) by mouth every morning. Open capsule and sprinkle granules for 2 weeks post-op    ondansetron (ZOFRAN-ODT) 8 MG TbDL DISSOLVE 1 tablet (8 mg total) by mouth every 6 (six) hours as needed (nausea).    pioglitazone (ACTOS) 30 MG tablet Take 30 mg by mouth once daily.    rosuvastatin (CRESTOR) 20 MG tablet Take 20 mg by mouth once daily.    traMADoL (ULTRAM) 50 mg tablet Take 1 tablet (50 mg total) by mouth every 24 hours as needed for Pain.    ursodioL (ZANE FORTE) 500 MG tablet Take 1 tablet (500 mg total) by mouth once daily. for 14 days (Patient not taking: Reported on 4/1/2025)     No current facility-administered medications for this visit.       Review of patient's allergies indicates:  No Known Allergies    Family History   Problem Relation Name Age of Onset    Mental illness Mother Kaylynn Torres     Miscarriages / Stillbirths Mother Kaylynn Torres     Obesity Mother Kaylynn Torres     Alcohol abuse Father Trevor Torres     Arthritis Father Trevor Torres     Vision loss Father Trevor Torres     Heart disease Father Trevor Torres     Pulmonary fibrosis Father Trevor Torres        Social History[1]    Chief Complaint: No chief complaint on file.      History of present illness:  54-year-old right-hand-dominant female seen for years of worsening right shoulder pain.  Arm feels weak.  Can not raise her arm up over her head.  Hard to lift things now.  No previous treatment.  Pain is a 7/10.  There was no trauma or inciting event    Prior treatment:  None        Review of Systems:    Constitution: Negative for chills, fever, and sweats.  Negative for unexplained weight loss.    HENT:  Negative for headaches and blurry vision.    Cardiovascular:Negative for chest pain or irregular heart beat. Negative for hypertension.    Respiratory:   Negative for cough and shortness of breath.    Gastrointestinal: Negative for abdominal pain, heartburn, melena, nausea, and vomitting.    Genitourinary:  Negative bladder incontinence and dysuria.    Musculoskeletal:  See HPI    Neurological: Negative for numbness.    Psychiatric/Behavioral: Negative for depression.  The patient is not nervous/anxious.      Endocrine: Negative for polyuria    Hematologic/Lymphatic: Negative for bleeding problem.  Does not bruise/bleed easily.    Skin: Negative for poor would healing and rash      Physical Examination:    Vital Signs:  There were no vitals filed for this visit.    There is no height or weight on file to calculate BMI.    This a well-developed, well nourished patient in no acute distress.  They are alert and oriented and cooperative to examination.  Pt. walks without an antalgic gait.      Examination of the right shoulder shows no rashes or erythema. There are no masses, ecchymosis, or atrophy. The patient has full range of motion in forward flexion, external rotation, and internal rotation to the mid T-spine. The patient has moderately positive Carter and Neer test.- [place]'s test. - Speeds test. Nontender to palpation over a.c. joint. Normal stability anteriorly, posteriorly, and negative sulcus sign. Passive range of motion: Forward flexion of 180°, external rotation at 90° of 90°, internal rotation of 50°, and external rotation at 0° of 50°. 2+ radial pulse. Intact axillary, radial, median and ulnar sensation.4 out of 5 resisted forward flexion, 5/5 external rotation, and negative lift off test.      X-rays:  X-rays of the right shoulder  review which show some mild degenerative changes with well-maintained glenohumeral joint space.  Does have some calcifications around the rotator cuff insertions both anteriorly and posteriorly.    MRI of the right shoulder is reviewed and interpreted:Possible partial tear with tendinitis of the supraspinatus tendon.   Full-thickness tear or retraction is not seen.  Trace fluid in the subdeltoid bursa.  Small inferior spur at the AC joint.          Assessment:  Rotator cuff tendinopathy    Plan:  I reviewed the findings with her today.  Patient has had pain for quite some time.  Now has pain and weakness.  I recommended a cortisone injection as well as a rotator cuff and shoulder conditioning guide.  I do not see anything that would require surgery.  Patient can follow up in 2 months if not improving.  Next step would either be surgery or formal physical therapy.            All previous pertinent notes including ER visits, physical therapy visits, other orthopedic visits as well as other care for the same musculoskeletal problem were reviewed.  All pertinent lab values and previous imaging was reviewed pertinent to the current visit.    This note was created using [employer] voice recognition software that occasionally misinterpreted phrases or words.    Consult note is delivered via Epic messaging service.             [1]   Social History  Socioeconomic History    Marital status:    Tobacco Use    Smoking status: Former     Current packs/day: 0.00     Average packs/day: 0.7 packs/day for 43.2 years (29.1 ttl pk-yrs)     Types: Cigarettes     Start date: 1988     Quit date: 3/20/2016     Years since quittin.0     Passive exposure: Past    Smokeless tobacco: Never    Tobacco comments:     Quit tobacco smoking in 2013   Substance and Sexual Activity    Alcohol use: Not Currently     Comment: social    Drug use: Not Currently     Types: Marijuana     Comment: rarely    Sexual activity: Yes     Partners: Male     Birth control/protection: Post-menopausal, See Surgical Hx, None     Social Drivers of Health     Financial Resource Strain: Low Risk  (2025)    Overall Financial Resource Strain (CARDIA)     Difficulty of Paying Living Expenses: Not hard at all   Food Insecurity: No Food Insecurity (2025)    Hunger  Vital Sign     Worried About Running Out of Food in the Last Year: Never true     Ran Out of Food in the Last Year: Never true   Transportation Needs: No Transportation Needs (2/12/2025)    PRAPARE - Transportation     Lack of Transportation (Medical): No     Lack of Transportation (Non-Medical): No   Physical Activity: Insufficiently Active (2/12/2025)    Exercise Vital Sign     Days of Exercise per Week: 3 days     Minutes of Exercise per Session: 20 min   Stress: No Stress Concern Present (2/12/2025)    [employer] - Occupational Stress Questionnaire     Feeling of Stress : Not at all   Housing Stability: Low Risk  (2/12/2025)    Housing Stability Vital Sign     Unable to Pay for Housing in the Last Year: No     Number of Times Moved in the Last Year: 0     Homeless in the Last Year: No

## 2025-04-07 ENCOUNTER — HOSPITAL ENCOUNTER (OUTPATIENT)
Dept: VASCULAR SURGERY | Facility: CLINIC | Age: 55
Discharge: HOME | End: 2025-04-07
Attending: STUDENT IN AN ORGANIZED HEALTH CARE EDUCATION/TRAINING PROGRAM
Payer: COMMERCIAL

## 2025-04-07 ENCOUNTER — OFFICE VISIT (OUTPATIENT)
Dept: VASCULAR SURGERY | Facility: CLINIC | Age: 55
End: 2025-04-07
Payer: COMMERCIAL

## 2025-04-07 VITALS
WEIGHT: 220.44 LBS | SYSTOLIC BLOOD PRESSURE: 99 MMHG | TEMPERATURE: 98 F | DIASTOLIC BLOOD PRESSURE: 60 MMHG | HEART RATE: 79 BPM | BODY MASS INDEX: 34.6 KG/M2 | HEIGHT: 67 IN

## 2025-04-07 DIAGNOSIS — I83.892 SYMPTOMATIC VARICOSE VEINS OF LEFT LOWER EXTREMITY: Primary | ICD-10-CM

## 2025-04-07 DIAGNOSIS — I83.90 VARICOSE VEINS OF LOWER EXTREMITY, UNSPECIFIED LATERALITY, UNSPECIFIED WHETHER COMPLICATED: ICD-10-CM

## 2025-04-07 DIAGNOSIS — I87.2 VENOUS INSUFFICIENCY: Primary | ICD-10-CM

## 2025-04-07 PROCEDURE — 3044F HG A1C LEVEL LT 7.0%: CPT | Mod: CPTII,S$GLB,COE, | Performed by: STUDENT IN AN ORGANIZED HEALTH CARE EDUCATION/TRAINING PROGRAM

## 2025-04-07 PROCEDURE — 1159F MED LIST DOCD IN RCRD: CPT | Mod: CPTII,S$GLB,COE, | Performed by: STUDENT IN AN ORGANIZED HEALTH CARE EDUCATION/TRAINING PROGRAM

## 2025-04-07 PROCEDURE — 99204 OFFICE O/P NEW MOD 45 MIN: CPT | Mod: S$GLB,COE,, | Performed by: STUDENT IN AN ORGANIZED HEALTH CARE EDUCATION/TRAINING PROGRAM

## 2025-04-07 PROCEDURE — 99999 PR PBB SHADOW E&M-EST. PATIENT-LVL IV: CPT | Mod: PBBFAC,COE,, | Performed by: STUDENT IN AN ORGANIZED HEALTH CARE EDUCATION/TRAINING PROGRAM

## 2025-04-07 PROCEDURE — 4010F ACE/ARB THERAPY RXD/TAKEN: CPT | Mod: CPTII,S$GLB,COE, | Performed by: STUDENT IN AN ORGANIZED HEALTH CARE EDUCATION/TRAINING PROGRAM

## 2025-04-07 PROCEDURE — 3060F POS MICROALBUMINURIA REV: CPT | Mod: CPTII,S$GLB,COE, | Performed by: STUDENT IN AN ORGANIZED HEALTH CARE EDUCATION/TRAINING PROGRAM

## 2025-04-07 PROCEDURE — 3008F BODY MASS INDEX DOCD: CPT | Mod: CPTII,S$GLB,COE, | Performed by: STUDENT IN AN ORGANIZED HEALTH CARE EDUCATION/TRAINING PROGRAM

## 2025-04-07 PROCEDURE — 3078F DIAST BP <80 MM HG: CPT | Mod: CPTII,S$GLB,COE, | Performed by: STUDENT IN AN ORGANIZED HEALTH CARE EDUCATION/TRAINING PROGRAM

## 2025-04-07 PROCEDURE — 3066F NEPHROPATHY DOC TX: CPT | Mod: CPTII,S$GLB,COE, | Performed by: STUDENT IN AN ORGANIZED HEALTH CARE EDUCATION/TRAINING PROGRAM

## 2025-04-07 PROCEDURE — 93970 EXTREMITY STUDY: CPT | Mod: S$GLB,COE,, | Performed by: SURGERY

## 2025-04-07 PROCEDURE — 3074F SYST BP LT 130 MM HG: CPT | Mod: CPTII,S$GLB,COE, | Performed by: STUDENT IN AN ORGANIZED HEALTH CARE EDUCATION/TRAINING PROGRAM

## 2025-04-07 NOTE — PROGRESS NOTES
VASCULAR SURGERY SERVICE    REFERRING DOCTOR: Chelsie Temple MD    CHIEF COMPLAINT: Varicose veins    HISTORY OF PRESENT ILLNESS: Gayatri Dela Cruz is a 54 y.o. female with a past medical history significant for endometrial cancer, HLD, obesity, T2DM, HLD and a hiatal hernia who presents for evaluation of varicose veins on her left side. Has had them all her life but have worsened over the years. Recently lost 50 lbs,  has not noticed a different in veins. Has tried compression stockings but is not very adherent to them. Pain is 8/10, achy.  Legs throb at night.     Past Medical History:   Diagnosis Date    Arthritis 2021    COVID-19     Dyslipidemia     Encounter for blood transfusion 03/2019    Endometrial cancer 03/19/2019    Essential (primary) hypertension     Hiatal hernia 10/04/2022    Hyperlipidemia 2019    Morbid obesity with BMI of 45.0-49.9, adult     Simple ovarian cyst 03/19/2019    Type 2 diabetes mellitus with diabetic polyneuropathy        Past Surgical History:   Procedure Laterality Date    INJECTION OF ANESTHETIC AGENT INTO TISSUE PLANE DEFINED BY TRANSVERSUS ABDOMINIS MUSCLE  1/30/2025    Procedure: BLOCK, TRANSVERSUS ABDOMINIS PLANE;  Surgeon: Gely Dawson MD;  Location: [facility] OR [address] [place] [address];  Service: General;;    LAPAROSCOPIC GASTROENTEROSTOMY N/A 1/30/2025    Procedure: GASTROENTEROSTOMY, LAPAROSCOPIC W/ INTRAOP EGD;  Surgeon: Gely Dawson MD;  Location: [facility] OR [address] [place] [address];  Service: General;  Laterality: N/A;  ABDON PT WAL#  [id number]  BMI 42.97  SURGEON TO DO TAP BLOCK INTRAOP    LAPAROSCOPY-ASSISTED VAGINAL HYSTERECTOMY  04/01/2019    robotic       Current Medications[1]    Review of patient's allergies indicates:  No Known Allergies    Family History   Problem Relation Name Age of Onset    Mental illness Mother Kaylynn Torres     Miscarriages / Stillbirths Mother Kaylynn Torres     Obesity Mother Kaylynn Torres     Alcohol abuse Father Trevor Torres     Arthritis Father  Trevor Torres     Vision loss Father Trevor Torres     Heart disease Father Trevor Torres     Pulmonary fibrosis Father Trevor Torres        Social History[2]    REVIEW OF SYSTEMS:  General: No chills, fever,   HEENT: No visual changes, difficulty hearing  Cardiovascular: No chest pain, palpitations, claudication  Pulmonary: No dyspnea, cough, wheezing  Gastrointestinal: No nausea, vomiting, diarrhea, constipation  Neurologic: no dizziness, no headaches, no weakness    PHYSICAL EXAM:   [facility] 06/01/2016 Comment: pt has always had irregular periods  Constitutional:  Alert,   Well-appearing  In no distress.   Neurological: Normal speech  no focal findings  CN II - XII grossly intact.    Psychiatric: Mood and affect appropriate and symmetric.   HEENT: Normocephalic / atraumatic  PERRLA  Midline trachea  No scars across the neck   Cardiac: Regular rate and rhythm.   Pulmonary: Normal pulmonary effort.   Abdomen: Soft, not distended.     Skin: Warm and well perfused.    Vascular:  2+ DP's bilaterally. Large, ropey varicosities of her left lower leg.   Extremities/  Musculoskeletal: No edema.        IMAGING:  Indication   ========     Venous Insufficiency     Lower Extremity Veins   =================     Rt CFV:    complete compression, complete color fill   Rt prox femoral V: complete compression, complete color fill   Rt mid femoral V:  complete compression, complete color fill   Rt distal femoral V:   complete compression, complete color fill   Rt prox popliteal V:   complete compression, complete color fill   Rt mid popliteal V:    complete compression, complete color fill   Rt distal popliteal V: complete compression, complete color fill   Rt post tibial V:  complete compression, complete color fill,   Rt peroneal V: complete compression, complete color fill   Rt GSV:    complete compression, complete color fill, significant reflux   Rt SSV:    complete compression, complete color fill, no reflux   Add rt lower V details:     complete compression, complete color fill, Reflux Present   Lt CFV:    complete compression, complete color fill   Lt prox femoral V: complete compression, complete color fill   Lt mid femoral V:  complete compression, complete color fill   Lt distal femoral V:   complete compression, complete color fill   Lt prox popliteal V:   complete compression, complete color fill   Lt mid popliteal V:    complete compression, complete color fill   Lt distal popliteal V: complete compression, complete color fill   Lt post tibial V:  complete compression, complete color fill   Lt peroneal V: complete compression, complete color fill   Lt GSV:    complete compression, complete color fill, significant reflux   Lt SSV:    complete compression, complete color fill, no reflux   Add lt lower V details:    complete compression, complete color fill, Reflux Present   Additional lower vein: Saphenofemoral Junction   Other: No reflux noted in the Accessory Vein     Lower Extremity Vein Mapping   =======================     Rt prox GSV thigh AP diam  5.8 mm   Rt mid GSV thigh AP diam   4.9 mm   Rt distal GSV thigh AP diam    4.1 mm   Rt GSV knee AP diam    2.9 mm   Rt prox GSV calf AP diam   2.8 mm   Rt mid GSV calf AP diam    2.8 mm   Rt distal GSV calf AP diam 2.3 mm   Lt prox GSV thigh AP diam  10.5 mm   Lt mid GSV thigh AP diam   10.2 mm   Lt distal GSV thigh AP diam    13.6 mm   Lt GSV knee AP diam    8.7 mm   Lt prox GSV calf AP diam   14.9 mm   Lt mid GSV calf AP diam    4.0 mm   Lt distal GSV calf AP diam 4.0 mm     Comment   ========     The diagnostic criteria used in our lab: Significant reflux is present when retrograde flow is noted both in the vein and at the   saphenofemoral junction (SFJ) or saphenopopliteal junction (SPJ)for 500 milliseconds (.5 second) following provocative maneuvers   when the patient is in the reverse trendelenburg position.     Impression   =========     Right Leg: Duplex imaging of the right lower  extremity veins demonstrates patent and compressible veins.   There is no evidence of a venous thrombosis in the deep or superficial veins.   Significant reflux noted in the right GSV including the Saphenofemoral Junction (SFJ). No significant reflux noted in the SSV or   accessory vein.     Left Leg: Duplex imaging of the left lower extremity veins demonstrates patent and compressible veins.   There is no evidence of a venous thrombosis in the deep or superficial veins.   Significant reflux noted in the left GSV including the Saphenofemoral Junction (SFJ). No significant reflux noted in the SSV or   accessory vein.   The GSV branches extensively from the mid calf to the ankle.       DATE OF SERVICE: 04/07/2025                                                       Sonographer: LAVINIA DELGADO RVS   Electronically Signed by: Toni Castellon II at 04/08/2025-10:49     IMPRESSION: 55 yo female with symptomatic varicose veins of her left leg    PLAN:   I wrote a script for compression stockings and provided it to her in clinic today. I also explained that typically I would treat veins like hers in a staged fashion, starting with an EVLT, followed by stab phlebectomies at a later date. We discussed risks of surgery including but not limited to DVT, pain, scarring, nerve damage, recurrence and need for further surgery. We will see her back in 3 months to see how she is doing.                [1]   Current Outpatient Medications:     aspirin (ECOTRIN) 81 MG EC tablet, Take 81 mg by mouth once daily., Disp: , Rfl:     cyanocobalamin (VITAMIN B-12) 100 MCG tablet, Take 5,000 mcg by mouth. 2 Times in a week, Disp: , Rfl:     cyclobenzaprine (FLEXERIL) 5 MG tablet, Take 1 tablet (5 mg total) by mouth nightly as needed for Muscle spasms., Disp: 20 tablet, Rfl: 0    diclofenac sodium (VOLTAREN ARTHRITIS PAIN) 1 % Gel, Apply 2 g topically 4 (four) times daily., Disp: 50 g, Rfl: 1    fish oil-omega-3 fatty acids 300-1,000 mg capsule,  Take by mouth 2 (two) times daily., Disp: , Rfl:     gabapentin (NEURONTIN) 400 MG capsule, Take 2 capsules (800 mg total) by mouth every evening., Disp: 60 capsule, Rfl: 11    JANUVIA 50 mg Tab, 50 mg once daily., Disp: , Rfl:     JARDIANCE 10 mg tablet, Take 10 mg by mouth once daily., Disp: , Rfl:     lisinopriL (PRINIVIL,ZESTRIL) 5 MG tablet, Take 1 tablet (5 mg total) by mouth once daily., Disp: 90 tablet, Rfl: 3    melatonin 10 mg Chew, Take by mouth. Nightly, Disp: , Rfl:     metFORMIN (GLUCOPHAGE) 1000 MG tablet, Take 1,000 mg by mouth 2 (two) times daily. Taking time release, Disp: , Rfl:     multivitamin (THERAGRAN) per tablet, Take 1 tablet by mouth once daily., Disp: , Rfl:     omeprazole (PRILOSEC) 40 MG capsule, Take 1 capsule (40 mg total) by mouth every morning. Open capsule and sprinkle granules for 2 weeks post-op, Disp: 30 capsule, Rfl: 2    ondansetron (ZOFRAN-ODT) 8 MG TbDL, DISSOLVE 1 tablet (8 mg total) by mouth every 6 (six) hours as needed (nausea)., Disp: 30 tablet, Rfl: 2    pioglitazone (ACTOS) 30 MG tablet, Take 30 mg by mouth once daily., Disp: , Rfl:     rosuvastatin (CRESTOR) 20 MG tablet, Take 20 mg by mouth once daily., Disp: , Rfl:     traMADoL (ULTRAM) 50 mg tablet, Take 1 tablet (50 mg total) by mouth every 24 hours as needed for Pain., Disp: 7 each, Rfl: 0    ursodioL (ZANE FORTE) 500 MG tablet, Take 1 tablet (500 mg total) by mouth once daily. for 14 days (Patient not taking: Reported on 2025), Disp: 14 tablet, Rfl: 0  [2]   Social History  Tobacco Use    Smoking status: Former     Current packs/day: 0.00     Average packs/day: 0.7 packs/day for 43.2 years (29.1 ttl pk-yrs)     Types: Cigarettes     Start date: 1988     Quit date: 3/20/2016     Years since quittin.0     Passive exposure: Past    Smokeless tobacco: Never    Tobacco comments:     Quit tobacco smoking in 2013   Substance Use Topics    Alcohol use: Not Currently     Comment: social    Drug use: Not  Currently     Types: Marijuana     Comment: rarely

## 2025-05-20 DIAGNOSIS — S82.64XA NONDISPLACED FRACTURE OF LATERAL MALLEOLUS OF RIGHT FIBULA, INITIAL ENCOUNTER FOR CLOSED FRACTURE: Primary | ICD-10-CM

## 2025-05-23 ENCOUNTER — OFFICE VISIT (OUTPATIENT)
Dept: ORTHOPEDICS | Facility: CLINIC | Age: 55
End: 2025-05-23
Payer: COMMERCIAL

## 2025-05-23 ENCOUNTER — HOSPITAL ENCOUNTER (OUTPATIENT)
Dept: RADIOLOGY | Facility: HOSPITAL | Age: 55
Discharge: HOME | End: 2025-05-23
Attending: ORTHOPAEDIC SURGERY
Payer: COMMERCIAL

## 2025-05-23 DIAGNOSIS — S82.64XA NONDISPLACED FRACTURE OF LATERAL MALLEOLUS OF RIGHT FIBULA, INITIAL ENCOUNTER FOR CLOSED FRACTURE: Primary | ICD-10-CM

## 2025-05-23 DIAGNOSIS — S82.64XA NONDISPLACED FRACTURE OF LATERAL MALLEOLUS OF RIGHT FIBULA, INITIAL ENCOUNTER FOR CLOSED FRACTURE: ICD-10-CM

## 2025-05-23 PROCEDURE — 73610 X-RAY EXAM OF ANKLE: CPT | Mod: 26,RT,COE, | Performed by: RADIOLOGY

## 2025-05-23 PROCEDURE — 73610 X-RAY EXAM OF ANKLE: CPT | Mod: TC,PO,RT

## 2025-05-23 NOTE — PROGRESS NOTES
Status/Diagnosis: Nondisplaced Right lateral malleolus fracture, Oneill A.  Date of Surgery: none  Date of Injury: 12/22/2024  Return visit: PRN  X-rays on Return: pending patient complaint    Chief Complaint:   Right ankle pain    Present History:  WORK COMP CASE  Patient presents today via referral from No ref. provider found   Gayatri presents for follow-up after falling in the [facility] parking lot on December 22nd, approximately three weeks ago. She reports going to urgent care or the emergency room immediately after the incident, where she was diagnosed with a hairline fracture. She was provided with a short CAM boot and crutches for treatment. Gayatri mentions difficulty using crutches and has been using a knee scooter for longer distances. She notes improved range of motion in the affected area since time of injury.    She mentions having mild neuropathy on the bottom of her foot. Gayatri describes her work at [employer], stating she does a significant amount of walking during shifts, approximately 18,000 to 20,000 steps. She expresses concern about standing for long periods at work.    The fracture is identified as being on the fibula bone, specifically on the outside of the foot. Gayatri has been managing her condition at home, mentioning she does not wear the boot at home and can walk short distances without it.    PMH significant for type 2 diabetes, A1c of 8.5; diabetic neuropathy; hypertension; and hyperlipidemia.  Denies tobacco use.    02/14/2025  She is here for follow up evaluation of right ankle fracture.  Overall she is doing very well and has no complaints of pain.  She has been compliant with boot wear.  She was placed on light duty initially but has recently been out of work due to unrelated bariatric surgery.  The patient, plans to return to work in 4 weeks' time.    03/28/2025:  Patient returns today for repeat clinical evaluation and x-ray.  Overall doing well.  0/10 pain.  Has been wearing  the lace-up ankle brace for the most part but has been taking it off while walking around the house.  No new injuries.  Denies any numbness or tingling.  She remains at light duty while at work.    05/23/2025:  Patient returns today for repeat clinical evaluation.  She is full weight-bearing in normal shoe wear using her lace-up ankle brace consistently while at work.  She is back to work at full duty.  2/10 pain.  No new injuries.  Denies any numbness or tingling.      Past Medical History:   Diagnosis Date    Arthritis 2021    COVID-19     Dyslipidemia     Encounter for blood transfusion 03/2019    Endometrial cancer 03/19/2019    Essential (primary) hypertension     Hiatal hernia 10/04/2022    Hyperlipidemia 2019    Morbid obesity with BMI of 45.0-49.9, adult     Simple ovarian cyst 03/19/2019    Type 2 diabetes mellitus with diabetic polyneuropathy        Past Surgical History:   Procedure Laterality Date    INJECTION OF ANESTHETIC AGENT INTO TISSUE PLANE DEFINED BY TRANSVERSUS ABDOMINIS MUSCLE  1/30/2025    Procedure: BLOCK, TRANSVERSUS ABDOMINIS PLANE;  Surgeon: Gely Dawson MD;  Location: [facility] OR [address] [place] [address];  Service: General;;    LAPAROSCOPIC GASTROENTEROSTOMY N/A 1/30/2025    Procedure: GASTROENTEROSTOMY, LAPAROSCOPIC W/ INTRAOP EGD;  Surgeon: Gely Dawson MD;  Location: [facility] OR [address] [place] [address];  Service: General;  Laterality: N/A;  [facility] PT WAL#  [id number]  BMI 42.97  SURGEON TO DO TAP BLOCK INTRAOP    LAPAROSCOPY-ASSISTED VAGINAL HYSTERECTOMY  04/01/2019    robotic       Current Outpatient Medications   Medication Sig    aspirin (ECOTRIN) 81 MG EC tablet Take 81 mg by mouth once daily.    cyanocobalamin (VITAMIN B-12) 100 MCG tablet Take 5,000 mcg by mouth. 2 Times in a week    cyclobenzaprine (FLEXERIL) 5 MG tablet Take 1 tablet (5 mg total) by mouth nightly as needed for Muscle spasms.    diclofenac sodium (VOLTAREN ARTHRITIS PAIN) 1 % Gel Apply 2 g topically 4 (four) times daily.    fish  oil-omega-3 fatty acids 300-1,000 mg capsule Take by mouth 2 (two) times daily.    gabapentin (NEURONTIN) 400 MG capsule Take 2 capsules (800 mg total) by mouth every evening.    JANUVIA 50 mg Tab 50 mg once daily.    JARDIANCE 10 mg tablet Take 10 mg by mouth once daily.    lisinopriL (PRINIVIL,ZESTRIL) 5 MG tablet Take 1 tablet (5 mg total) by mouth once daily.    melatonin 10 mg Chew Take by mouth. Nightly    metFORMIN (GLUCOPHAGE) 1000 MG tablet Take 1,000 mg by mouth 2 (two) times daily. Taking time release    multivitamin (THERAGRAN) per tablet Take 1 tablet by mouth once daily.    omeprazole (PRILOSEC) 40 MG capsule Take 1 capsule (40 mg total) by mouth every morning. Open capsule and sprinkle granules for 2 weeks post-op    ondansetron (ZOFRAN-ODT) 8 MG TbDL DISSOLVE 1 tablet (8 mg total) by mouth every 6 (six) hours as needed (nausea).    pioglitazone (ACTOS) 30 MG tablet Take 30 mg by mouth once daily.    rosuvastatin (CRESTOR) 20 MG tablet Take 20 mg by mouth once daily.    ursodioL (ZANE FORTE) 500 MG tablet Take 1 tablet (500 mg total) by mouth once daily. for 14 days (Patient not taking: Reported on 4/1/2025)     No current facility-administered medications for this visit.       Review of patient's allergies indicates:  No Known Allergies    Family History   Problem Relation Name Age of Onset    Mental illness Mother Kaylynn Torres     Miscarriages / Stillbirths Mother Kaylynn Torres     Obesity Mother Kaylynn Torres     Alcohol abuse Father Trevor Torres     Arthritis Father Trevor Torres     Vision loss Father Trevor Torres     Heart disease Father Trevor Torres     Pulmonary fibrosis Father Trevor Torres        Social History     Socioeconomic History    Marital status:    Tobacco Use    Smoking status: Former     Current packs/day: 0.00     Average packs/day: 0.7 packs/day for 43.2 years (29.1 ttl pk-yrs)     Types: Cigarettes     Start date: 1/1/1988     Quit date: 3/20/2016     Years since  quittin.1     Passive exposure: Past    Smokeless tobacco: Never    Tobacco comments:     Quit tobacco smoking in 2013   Substance and Sexual Activity    Alcohol use: Not Currently     Comment: social    Drug use: Not Currently     Types: Marijuana     Comment: rarely    Sexual activity: Yes     Partners: Male     Birth control/protection: Post-menopausal, See Surgical Hx, None     Social Drivers of Health     Financial Resource Strain: Low Risk  (2025)    Overall Financial Resource Strain (CARDIA)     Difficulty of Paying Living Expenses: Not hard at all   Food Insecurity: No Food Insecurity (2025)    Hunger Vital Sign     Worried About Running Out of Food in the Last Year: Never true     Ran Out of Food in the Last Year: Never true   Transportation Needs: No Transportation Needs (2025)    PRAPARE - Transportation     Lack of Transportation (Medical): No     Lack of Transportation (Non-Medical): No   Physical Activity: Insufficiently Active (2025)    Exercise Vital Sign     Days of Exercise per Week: 3 days     Minutes of Exercise per Session: 20 min   Stress: No Stress Concern Present (2025)    [employer] - Occupational Stress Questionnaire     Feeling of Stress : Not at all   Housing Stability: Low Risk  (2025)    Housing Stability Vital Sign     Unable to Pay for Housing in the Last Year: No     Number of Times Moved in the Last Year: 0     Homeless in the Last Year: No       Physical exam:  There were no vitals filed for this visit.  There is no height or weight on file to calculate BMI.  General: In no apparent distress; well developed and well nourished.  HEENT: normocephalic; atraumatic.  Cardiovascular: regular rate.  Respiratory: no increased work of breathing.  Musculoskeletal:   Gait: nonantalgic  Inspection:   Mild residual lateral ankle swelling but improved.  No tenderness over lateral malleolus on deep palpation.  Silf[place]kiold:   Deferred  Alignment:  Knee: neutral               Ankle: neutral              Hindfoot: neutral              Forefoot: neutral   Strength:              Dorsiflexion 5/5  Plantar flexion 5/5  Inversion 5/5  Eversion 5/5  Sensation:              Good sensation on monofilament testing  ROM:              Ankle: Near full without pain.  Pulses: Palpable pedal pulse                   Imaging Studies/Outside documentation:  I have ordered/reviewed/interpreted the following images/outside documentation:  1. Weight-bearing 3-views of Right ankle:   On my independent review, previously noted nondisplaced distal fibular tip avulsion fracture, Oneill a.  Fracture line still visible with no significant bony bridging. Findings more consistent with fibrous union.   Diffuse arthritic changes throughout the midfoot with concomitant dorsal osteophyte formation.  Large enthesophyte at the Achilles insertion and plantar fascia origin with adjacent Corin deformity.  Ankle mortise remains congruent.        Assessment:  WORK COMP CASE  Gayatri Dela Cruz is a 54 y.o. female with Nondisplaced Right lateral malleolus fracture, Oneill A.     Plan:   Clinical and radiographic findings were discussed, specifically more prominent fibrous union on repeat x-ray today.    Patient continues to do well from a clinical standpoint thus no indication for surgical intervention at this time.    Discussed natural history of fibrous versus bony union in terms of fracture healing.  Lace-up ankle brace as needed.  Patient informed to let pain be her guide.    Okay to continue at work at full duty without restriction.      Patient voiced understanding.  All questions were answered.  She will follow up on an as-needed basis.      This note was created using voice recognition software and may contain grammatical errors.

## 2025-06-02 ENCOUNTER — TELEPHONE (OUTPATIENT)
Dept: FAMILY MEDICINE | Facility: CLINIC | Age: 55
End: 2025-06-02
Payer: COMMERCIAL

## 2025-06-02 DIAGNOSIS — Z12.31 OTHER SCREENING MAMMOGRAM: ICD-10-CM

## 2025-06-03 ENCOUNTER — HOSPITAL ENCOUNTER (OUTPATIENT)
Dept: RADIOLOGY | Facility: HOSPITAL | Age: 55
Discharge: HOME | End: 2025-06-03
Attending: STUDENT IN AN ORGANIZED HEALTH CARE EDUCATION/TRAINING PROGRAM
Payer: COMMERCIAL

## 2025-06-03 DIAGNOSIS — Z12.31 OTHER SCREENING MAMMOGRAM: ICD-10-CM

## 2025-06-03 PROCEDURE — 77067 SCR MAMMO BI INCL CAD: CPT | Mod: 26,,, | Performed by: RADIOLOGY

## 2025-06-03 PROCEDURE — 77067 SCR MAMMO BI INCL CAD: CPT | Mod: TC,PO

## 2025-06-03 PROCEDURE — 77063 BREAST TOMOSYNTHESIS BI: CPT | Mod: 26,,, | Performed by: RADIOLOGY

## 2025-06-08 ENCOUNTER — RESULTS FOLLOW-UP (OUTPATIENT)
Dept: FAMILY MEDICINE | Facility: CLINIC | Age: 55
End: 2025-06-08

## 2025-06-09 ENCOUNTER — PATIENT MESSAGE (OUTPATIENT)
Dept: VASCULAR SURGERY | Facility: CLINIC | Age: 55
End: 2025-06-09
Payer: COMMERCIAL

## 2025-06-12 ENCOUNTER — HOSPITAL ENCOUNTER (OUTPATIENT)
Dept: RADIOLOGY | Facility: HOSPITAL | Age: 55
Discharge: HOME | End: 2025-06-12
Attending: PHYSICAL MEDICINE & REHABILITATION
Payer: COMMERCIAL

## 2025-06-12 ENCOUNTER — OFFICE VISIT (OUTPATIENT)
Dept: SPINE | Facility: CLINIC | Age: 55
End: 2025-06-12
Payer: COMMERCIAL

## 2025-06-12 VITALS — BODY MASS INDEX: 34.6 KG/M2 | HEIGHT: 67 IN | WEIGHT: 220.44 LBS

## 2025-06-12 DIAGNOSIS — M54.6 PAIN IN THORACIC SPINE: ICD-10-CM

## 2025-06-12 DIAGNOSIS — M54.50 ACUTE BILATERAL LOW BACK PAIN WITHOUT SCIATICA: Primary | ICD-10-CM

## 2025-06-12 DIAGNOSIS — M54.50 ACUTE BILATERAL LOW BACK PAIN WITHOUT SCIATICA: ICD-10-CM

## 2025-06-12 PROCEDURE — 72114 X-RAY EXAM L-S SPINE BENDING: CPT | Mod: TC

## 2025-06-12 PROCEDURE — 99999 PR PBB SHADOW E&M-EST. PATIENT-LVL IV: CPT | Mod: PBBFAC,COE,, | Performed by: PHYSICAL MEDICINE & REHABILITATION

## 2025-06-12 PROCEDURE — 72114 X-RAY EXAM L-S SPINE BENDING: CPT | Mod: 26,,, | Performed by: RADIOLOGY

## 2025-06-12 NOTE — PROGRESS NOTES
SUBJECTIVE:    Patient ID: Gayatri Dela Cruz is a 54 y.o. female.    Chief Complaint: Back Pain, Mid-back Pain, and Low-back Pain    This is a 54-year-old woman who sees Dr. Temple for her primary care.  History of diabetes and hypertension.  Remote history of endometrial cancer status post hysterectomy.  She is considered cured.  She had bariatric surgery in January and avoids nonsteroidal drugs.  No history of back problems.  She works at [employer] and has to do a significant amount of lifting.  She returned to work about late March after recovering from her bariatric surgery and since then she has developed pain from about the level of the bra line to the lumbosacral junction without radicular symptoms.  She wears a posture back brace that helps some.  She has tried over-the-counter medications and topicals but has not improved to her satisfaction.  She denies bowel or bladder dysfunction fever chills sweats or unexpected weight loss.  I have no imaging to review.  Current pain level is 3/10 but at times as high as 8/10 and interferes with her quality of life in terms of activities of daily living recreation and social activities.          Past Medical History:   Diagnosis Date    Arthritis 2021    COVID-19     Dyslipidemia     Encounter for blood transfusion 03/2019    Endometrial cancer 03/19/2019    Essential (primary) hypertension     Hiatal hernia 10/04/2022    Hyperlipidemia 2019    Morbid obesity with BMI of 45.0-49.9, adult     Simple ovarian cyst 03/19/2019    Type 2 diabetes mellitus with diabetic polyneuropathy      Social History[1]  Past Surgical History:   Procedure Laterality Date    INJECTION OF ANESTHETIC AGENT INTO TISSUE PLANE DEFINED BY TRANSVERSUS ABDOMINIS MUSCLE  1/30/2025    Procedure: BLOCK, TRANSVERSUS ABDOMINIS PLANE;  Surgeon: Gely Dawson MD;  Location: [facility] OR [address] [place] [address];  Service: General;;    LAPAROSCOPIC GASTROENTEROSTOMY N/A 1/30/2025    Procedure:  "GASTROENTEROSTOMY, LAPAROSCOPIC W/ INTRAOP EGD;  Surgeon: Gely Dawson MD;  Location: [facility] OR [address] [place] [address];  Service: General;  Laterality: N/A;  [facility] PT WAL#  [id number]  BMI 42.97  SURGEON TO DO TAP BLOCK INTRAOP    LAPAROSCOPY-ASSISTED VAGINAL HYSTERECTOMY  04/01/2019    robotic     Family History   Problem Relation Name Age of Onset    Mental illness Mother Kaylynn Torres     Miscarriages / Stillbirths Mother Kaylynn Torres     Obesity Mother Kaylynn Torres     Alcohol abuse Father Trevor Torres     Arthritis Father Trevor Torres     Vision loss Father Trevor Torres     Heart disease Father Trevor Torres     Pulmonary fibrosis Father Trevor Torres     Breast cancer Paternal Grandmother       Vitals:    06/12/25 0948   Weight: 100 kg (220 lb 7.4 oz)   Height: 5' 6.5" (1.689 m)       Review of Systems   Constitutional:  Negative for chills, diaphoresis, fatigue, fever and unexpected weight change.   HENT:  Negative for trouble swallowing.    Eyes:  Negative for visual disturbance.   Respiratory:  Negative for shortness of breath.    Cardiovascular:  Negative for chest pain.   Gastrointestinal:  Negative for abdominal pain, constipation, nausea and vomiting.   Genitourinary:  Negative for difficulty urinating.   Musculoskeletal:  Negative for arthralgias, back pain, gait problem, joint swelling, myalgias, neck pain and neck stiffness.   Neurological:  Negative for dizziness, speech difficulty, weakness, light-headedness, numbness and headaches.          Objective:      Physical Exam  Neurological:      Mental Status: She is alert and oriented to person, place, and time.      Comments: She is awake and in no acute distress  No point tenderness or palpable masses about thoracic or lumbar spine  Forward flexion and extension of the lumbar spine are normal and painless  She can heel and toe walk normally  Reflexes- +1-+2 reflexes at the " following:   C5-Biceps   C6-Brachioradialis   C7-Triceps   L3/4-Patellar   S1-Achilles   Kristofer sign is negative bilaterally  Strength testing- 5/5 strength in the following muscle groups:  C5-Elbow flexion  C6-Wrist extension  C7-Elbow extension  C8-Finger flexion  T1-Finger abduction  L2-Hip flexion  L3-Knee extension  L4-Ankle dorsiflexion  L5-Great toe extension  S1-Ankle plantar flexion                    Assessment:       1. Acute bilateral low back pain without sciatica    2. Pain in thoracic spine           Plan:     She has a nonfocal neurological examination and no historical red flags.  Etiology of her discomfort is not clear but I suspect a benign cause perhaps lumbar strain.  Regardless I think she can be treated conservatively.  We will get some x-rays on the thoracic and lumbar spine and start her in physical therapy.  She can follow up with me at the completion of therapy or as needed      Acute bilateral low back pain without sciatica  -     X-Ray Lumbar Complete Including Flex And Ext; Future; Expected date: 2025  -     Ambulatory Referral/Consult to Physical Therapy    Pain in thoracic spine  -     X-Ray Thoracic Spine AP Lateral; Future; Expected date: 2025  -     Ambulatory Referral/Consult to Physical Therapy               [1]   Social History  Socioeconomic History    Marital status:    Tobacco Use    Smoking status: Former     Current packs/day: 0.00     Average packs/day: 0.7 packs/day for 43.2 years (29.1 ttl pk-yrs)     Types: Cigarettes     Start date: 1988     Quit date: 3/20/2016     Years since quittin.2     Passive exposure: Past    Smokeless tobacco: Never    Tobacco comments:     Quit tobacco smoking in    Substance and Sexual Activity    Alcohol use: Not Currently     Comment: social    Drug use: Not Currently     Types: Marijuana     Comment: rarely    Sexual activity: Yes     Partners: Male     Birth control/protection: Post-menopausal, See  Surgical Hx, None     Social Drivers of Health     Financial Resource Strain: Low Risk  (2/12/2025)    Overall Financial Resource Strain (CARDIA)     Difficulty of Paying Living Expenses: Not hard at all   Food Insecurity: No Food Insecurity (2/12/2025)    Hunger Vital Sign     Worried About Running Out of Food in the Last Year: Never true     Ran Out of Food in the Last Year: Never true   Transportation Needs: No Transportation Needs (2/12/2025)    PRAPARE - Transportation     Lack of Transportation (Medical): No     Lack of Transportation (Non-Medical): No   Physical Activity: Insufficiently Active (2/12/2025)    Exercise Vital Sign     Days of Exercise per Week: 3 days     Minutes of Exercise per Session: 20 min   Stress: No Stress Concern Present (2/12/2025)    [employer] - Occupational Stress Questionnaire     Feeling of Stress : Not at all   Housing Stability: Low Risk  (2/12/2025)    Housing Stability Vital Sign     Unable to Pay for Housing in the Last Year: No     Number of Times Moved in the Last Year: 0     Homeless in the Last Year: No

## 2025-06-13 ENCOUNTER — TELEPHONE (OUTPATIENT)
Dept: SPINE | Facility: CLINIC | Age: 55
End: 2025-06-13
Payer: COMMERCIAL

## 2025-06-13 ENCOUNTER — HOSPITAL ENCOUNTER (OUTPATIENT)
Dept: RADIOLOGY | Facility: HOSPITAL | Age: 55
Discharge: HOME | End: 2025-06-13
Attending: PHYSICAL MEDICINE & REHABILITATION
Payer: COMMERCIAL

## 2025-06-13 ENCOUNTER — PATIENT MESSAGE (OUTPATIENT)
Dept: SPINE | Facility: CLINIC | Age: 55
End: 2025-06-13
Payer: COMMERCIAL

## 2025-06-13 ENCOUNTER — RESULTS FOLLOW-UP (OUTPATIENT)
Dept: SPINE | Facility: CLINIC | Age: 55
End: 2025-06-13

## 2025-06-13 DIAGNOSIS — M54.6 PAIN IN THORACIC SPINE: ICD-10-CM

## 2025-06-13 PROCEDURE — 72070 X-RAY EXAM THORAC SPINE 2VWS: CPT | Mod: TC,PO

## 2025-06-13 PROCEDURE — 72070 X-RAY EXAM THORAC SPINE 2VWS: CPT | Mod: 26,,, | Performed by: RADIOLOGY

## 2025-06-19 ENCOUNTER — CLINICAL SUPPORT (OUTPATIENT)
Dept: REHABILITATION | Facility: HOSPITAL | Age: 55
End: 2025-06-19
Payer: COMMERCIAL

## 2025-06-19 DIAGNOSIS — R29.3 POSTURE ABNORMALITY: Primary | ICD-10-CM

## 2025-06-19 DIAGNOSIS — R53.1 STRENGTH LOSS OF: ICD-10-CM

## 2025-06-19 PROCEDURE — 97161 PT EVAL LOW COMPLEX 20 MIN: CPT | Mod: PN

## 2025-06-19 PROCEDURE — 97530 THERAPEUTIC ACTIVITIES: CPT | Mod: PN

## 2025-06-20 PROBLEM — R29.3 POSTURE ABNORMALITY: Status: ACTIVE | Noted: 2025-06-20

## 2025-06-20 PROBLEM — R53.1 STRENGTH LOSS OF: Status: ACTIVE | Noted: 2025-06-20

## 2025-06-20 NOTE — PROGRESS NOTES
Outpatient Rehab    Physical Therapy Evaluation    Patient Name: Gayatri Dela Cruz  MRN: [id number]  YOB: 1970  Encounter Date: 6/19/2025    Therapy Diagnosis:   Encounter Diagnoses   Name Primary?    Posture abnormality Yes    Strength loss of      Physician: Ramo David MD    Physician Orders: Eval and Treat  Medical Diagnosis: Acute bilateral low back pain without sciatica  Pain in thoracic spine  Surgical Diagnosis: Not applicable for this Episode   Surgical Date: Not applicable for this Episode  Days Since Last Surgery: Not applicable for this Episode    Visit # / Visits Authorized:  1 / 1  Insurance Authorization Period: 6/12/2025 to 12/31/2025  Date of Evaluation: 6/19/2025  Plan of Care Certification: 6/19/2025 to 8/14/2025     Time In: 1500   Time Out: 1545  Total Time (in minutes): 45   Total Billable Time (in minutes): 45    Intake Outcome Measure for FOTO Survey    Therapist reviewed FOTO scores for Gayatri Dela Cruz on 6/19/2025.   FOTO report - see Media section or FOTO account episode details.     Intake Score:  %    Precautions:       Subjective   History of Present Illness  Gayatri is a 54 y.o. female who reports to physical therapy with a chief concern of Low Back Pain.                 History of Present Condition/Illness: She was out of work or on light duty for about 3 months. First she was on light duty for a hairline fracture in her right ankle. Then she was out of work for several weeks recovering from her bariatric surgery. Returning to work as an online  at [employer] caused her back to start hurting. She started work again on March 29th. She thought it would get better with time but it hasn't yet. Any time she is up and moving for a bit it starts to hurt. Sitting unsupported for a while hurts. It hurts worst after working for a long time. She has to bend and lift at work. She wears a posture belt at work which she swears by. Her back pain slows her  down at work and at home. She doesn't have as much energy to spend time with her family. PMH of HTN and DM which have improved since the bariatric surgery.      Pain     Patient reports a current pain level of 5/10. Pain at best is reported as 0/10. Pain at worst is reported as 9/10.   Location: Midback and low back bilateral  Clinical Progression (since onset): Improved  Pain Qualities: Aching  Pain-Relieving Factors: Other (Comment)  Other Pain-Relieving Factors: CBD cream, other topical gels  Pain-Aggravating Factors: Walking, Other (Comment)  Other Pain-Aggravating Factors: working           Past Medical History/Physical Systems Review:   Gayatri Dela Cruz  has a past medical history of Arthritis, COVID-19, Dyslipidemia, Encounter for blood transfusion, Endometrial cancer, Essential (primary) hypertension, Hiatal hernia, Hyperlipidemia, Morbid obesity with BMI of 45.0-49.9, adult, Simple ovarian cyst, and Type 2 diabetes mellitus with diabetic polyneuropathy.    Gayatri Dela Cruz  has a past surgical history that includes Laparoscopy-assisted vaginal hysterectomy (04/01/2019); Laparoscopic gastroenterostomy (N/A, 1/30/2025); and Injection of anesthetic agent into tissue plane defined by transversus abdominis muscle (1/30/2025).    Gayatri has a current medication list which includes the following prescription(s): aspirin, cyanocobalamin, cyclobenzaprine, diclofenac sodium, fish oil-omega-3 fatty acids, gabapentin, januvia, jardiance, lisinopril, melatonin, metformin, multivitamin, omeprazole, ondansetron, pioglitazone, rosuvastatin, and ursodiol.    Review of patient's allergies indicates:  No Known Allergies     Objective   Posture  Patient presents with a Forward head position. Increased thoracic kyphosis is observed.   Shoulders are Rounded.        Excessive thoracic kyphosis     Spinal Mobility  Hypomobile: Thoracic and Lumbosacral           Lumbar Range of Motion   Limited lumbar range of  motion by 50% and compensating with excessive thoracic motion      Hip Range of Motion   Right Hip   Active (deg) Passive (deg) Pain   Flexion   90     Extension   5     ABduction         ADduction         External Rotation 90/90   75     External Rotation Prone         Internal Rotation 90/90   15     Internal Rotation Prone             Left Hip   Active (deg) Passive (deg) Pain   Flexion   90     Extension   5     ABduction         ADduction         External Rotation 90/90   75     External Rotation Prone         Internal Rotation 90/90   15     Internal Rotation Prone                            Hip Strength - Planes of Motion   Right Strength Right Pain Left Strength Left  Pain   Flexion (L2) 4   4     Extension 3+   3+     ABduction 3+   3+     ADduction           Internal Rotation           External Rotation               Knee Strength   Right Strength Right Pain Left Strength Left  Pain   Flexion (S2) 4-   4-     Prone Flexion           Extension (L3) 4-   4-                Lumbar/Pelvic Girdle Special Tests            Other Lumbar Tests  Positive: Right Quadrant and Left Quadrant                     Four Stage Balance Test                 Single Leg Stand - Right Foot: 10 sec  Single Leg Stand - Left Foot: 10 sec       Squat Testing     Observations  Bilateral: Knee Valgus and Anterior Tibial Translation Beyond Toes             Gait Analysis  Hip Observations During Gait  Bilateral: Hip Trendelenburg         Treatment:  Therapeutic Activity  TA 1: Education on condition and HEP (open book, bridge, paloff press)    Time Entry(in minutes):  PT Evaluation (Low) Time Entry: 30  Therapeutic Activity Time Entry: 10    Assessment & Plan   Assessment  Gayatri presents with a condition of Low complexity.   Presentation of Symptoms: Stable       Functional Limitations: Activity tolerance, Decreased ambulation distance/endurance, Completing work/school activities, Gait limitations, Maintaining balance, Painful  locomotion/ambulation, Pain with ADLs/IADLs, Range of motion, Participating in leisure activities, Performing household chores, Standing tolerance, Functional mobility  Impairments: Activity intolerance, Impaired physical strength, Lack of appropriate home exercise program, Pain with functional activity  Personal Factors Affecting Prognosis: Pain    Patient Goal for Therapy (PT): have no pain while working  Prognosis: Good  Assessment Details: Gayatri is a 54 year old female with medical diagnosis of acute low back pain. Patient presents with pain in midline low back that is worst after a long day of working. She has significant posture impairments and also gait impairments. Lower Extremity weakness noted as well as decreased mobility in spine. Painful and limited lumbar range of motion. Functional limitations include inability to work without pain.     Plan  From a physical therapy perspective, the patient would benefit from: Skilled Rehab Services    Planned therapy interventions include: Therapeutic exercise, Therapeutic activities, Neuromuscular re-education, and Manual therapy.            Visit Frequency: 2 times Per Week for 8 Weeks.       This plan was discussed with Patient.   Discussion participants: Agreed Upon Plan of Care             The patient's spiritual, cultural, and educational needs were considered, and the patient is agreeable to the plan of care and goals.           Goals:   Active       Long term goals        patient goal: have no pain while working       Start:  06/19/25    Expected End:  08/14/25            patient will have improved FOTO score to predicted level to show true functional improvements       Start:  06/19/25    Expected End:  08/14/25            patient will be independent in progressed Home Exercise Program to improve functional strength       Start:  06/19/25    Expected End:  08/14/25               Short term goals        patient will be independent in Home Exercise Program  to improve strength        Start:  06/19/25    Expected End:  07/17/25            patient will have improved glute strength by 1/3 MMT        Start:  06/19/25    Expected End:  07/17/25            patient will have improved sitting posture without brace to offload lumbar spine       Start:  06/19/25    Expected End:  07/17/25                Angie Conroy, PT, DPT  Board Certified Clinical Specialist in Orthopedic Physical Therapy

## 2025-06-23 ENCOUNTER — LAB VISIT (OUTPATIENT)
Dept: LAB | Facility: HOSPITAL | Age: 55
End: 2025-06-23
Attending: STUDENT IN AN ORGANIZED HEALTH CARE EDUCATION/TRAINING PROGRAM
Payer: COMMERCIAL

## 2025-06-23 ENCOUNTER — CLINICAL SUPPORT (OUTPATIENT)
Dept: REHABILITATION | Facility: HOSPITAL | Age: 55
End: 2025-06-23
Payer: COMMERCIAL

## 2025-06-23 DIAGNOSIS — R71.8 HIGH HEMATOCRIT: ICD-10-CM

## 2025-06-23 DIAGNOSIS — R53.1 STRENGTH LOSS OF: ICD-10-CM

## 2025-06-23 DIAGNOSIS — R29.3 POSTURE ABNORMALITY: Primary | ICD-10-CM

## 2025-06-23 LAB
ABSOLUTE EOSINOPHIL (OHS): 0.12 K/UL
ABSOLUTE MONOCYTE (OHS): 0.82 K/UL (ref 0.3–1)
ABSOLUTE NEUTROPHIL COUNT (OHS): 4.66 K/UL (ref 1.8–7.7)
BASOPHILS # BLD AUTO: 0.06 K/UL
BASOPHILS NFR BLD AUTO: 0.7 %
ERYTHROCYTE [DISTWIDTH] IN BLOOD BY AUTOMATED COUNT: 13.5 % (ref 11.5–14.5)
HCT VFR BLD AUTO: 45.1 % (ref 37–48.5)
HGB BLD-MCNC: 14.1 GM/DL (ref 12–16)
IMM GRANULOCYTES # BLD AUTO: 0.02 K/UL (ref 0–0.04)
IMM GRANULOCYTES NFR BLD AUTO: 0.2 % (ref 0–0.5)
LYMPHOCYTES # BLD AUTO: 2.41 K/UL (ref 1–4.8)
MCH RBC QN AUTO: 28.3 PG (ref 27–31)
MCHC RBC AUTO-ENTMCNC: 31.3 G/DL (ref 32–36)
MCV RBC AUTO: 91 FL (ref 82–98)
NUCLEATED RBC (/100WBC) (OHS): 0 /100 WBC
PLATELET # BLD AUTO: 229 K/UL (ref 150–450)
PMV BLD AUTO: 11.6 FL (ref 9.2–12.9)
RBC # BLD AUTO: 4.98 M/UL (ref 4–5.4)
RELATIVE EOSINOPHIL (OHS): 1.5 %
RELATIVE LYMPHOCYTE (OHS): 29.8 % (ref 18–48)
RELATIVE MONOCYTE (OHS): 10.1 % (ref 4–15)
RELATIVE NEUTROPHIL (OHS): 57.7 % (ref 38–73)
WBC # BLD AUTO: 8.09 K/UL (ref 3.9–12.7)

## 2025-06-23 PROCEDURE — 97530 THERAPEUTIC ACTIVITIES: CPT | Mod: PN,CQ

## 2025-06-23 PROCEDURE — 36415 COLL VENOUS BLD VENIPUNCTURE: CPT | Mod: PO

## 2025-06-23 PROCEDURE — 85025 COMPLETE CBC W/AUTO DIFF WBC: CPT

## 2025-06-23 PROCEDURE — 97112 NEUROMUSCULAR REEDUCATION: CPT | Mod: PN,CQ

## 2025-06-23 NOTE — PROGRESS NOTES
Outpatient Rehab    Physical Therapy Visit    Patient Name: Gayatri Dela Cruz  MRN: [id number]  YOB: 1970  Encounter Date: 6/23/2025    Therapy Diagnosis:   Encounter Diagnoses   Name Primary?    Posture abnormality Yes    Strength loss of      Physician: Ramo David MD    Physician Orders: Eval and Treat  Medical Diagnosis: Acute bilateral low back pain without sciatica  Pain in thoracic spine  Surgical Diagnosis: Not applicable for this Episode   Surgical Date: Not applicable for this Episode  Days Since Last Surgery: Not applicable for this Episode    Visit # / Visits Authorized:  1 / 12  Insurance Authorization Period: 6/18/2025 to 12/31/2025  Date of Evaluation: 6/19/2025  Plan of Care Certification: 6/20/2025 to 8/14/2025      PT/PTA: PTA   Number of PTA visits since last PT visit:1  Time In: 1500   Time Out: 1548  Total Time (in minutes): 48   Total Billable Time (in minutes): 48    FOTO:  Intake Score:  %  Survey Score 2:  %  Survey Score 3:  %    Precautions:       Subjective   Lower back is giving her more trouble today and with twisting..         Objective            Treatment:  Therapeutic Exercise  TE 1: Nustep x 8 minutes  Balance/Neuromuscular Re-Education  NMR 1: Open book x 20  NMR 2: Bridge RTB 3 x 10  NMR 3: Side lying clams RTB  3 x 10  NMR 4: TrA isometrics with PB x 30  NMR 5: SLR 2 x 10 B  NMR 6: Paloff press GTB 3 x 10  NMR 7: Hip extension over wedge 3 x 10 B  NMR 8: Precor hip abduction 60# 3 x 10    Time Entry(in minutes):  Neuromuscular Re-Education Time Entry: 30  Therapeutic Activity Time Entry: 10  Therapeutic Exercise Time Entry: 8    Assessment & Plan   Assessment: Gayatri is challenged with hip and core strength resulting in increased lumbar compensations. Progress as tolerated with focus on lifting mechanics for her job.   Evaluation/Treatment Tolerance: Patient tolerated treatment well    The patient will continue to benefit from skilled outpatient  physical therapy in order to address the deficits listed in the problem list on the initial evaluation, provide patient and family education, and maximize the patients level of independence in the home and community environments.     The patient's spiritual, cultural, and educational needs were considered, and the patient is agreeable to the plan of care and goals.           Plan: Continue per POC    Goals:   Active       Long term goals        patient goal: have no pain while working       Start:  06/19/25    Expected End:  08/14/25            patient will have improved FOTO score to predicted level to show true functional improvements       Start:  06/19/25    Expected End:  08/14/25            patient will be independent in progressed Home Exercise Program to improve functional strength       Start:  06/19/25    Expected End:  08/14/25               Short term goals        patient will be independent in Home Exercise Program to improve strength        Start:  06/19/25    Expected End:  07/17/25            patient will have improved glute strength by 1/3 MMT        Start:  06/19/25    Expected End:  07/17/25            patient will have improved sitting posture without brace to offload lumbar spine       Start:  06/19/25    Expected End:  07/17/25                Janette Hansen PTA

## 2025-06-24 ENCOUNTER — RESULTS FOLLOW-UP (OUTPATIENT)
Dept: FAMILY MEDICINE | Facility: CLINIC | Age: 55
End: 2025-06-24

## 2025-06-25 ENCOUNTER — CLINICAL SUPPORT (OUTPATIENT)
Dept: REHABILITATION | Facility: HOSPITAL | Age: 55
End: 2025-06-25
Payer: COMMERCIAL

## 2025-06-25 DIAGNOSIS — R53.1 STRENGTH LOSS OF: ICD-10-CM

## 2025-06-25 DIAGNOSIS — R29.3 POSTURE ABNORMALITY: Primary | ICD-10-CM

## 2025-06-25 PROCEDURE — 97530 THERAPEUTIC ACTIVITIES: CPT | Mod: PN,CQ

## 2025-06-25 PROCEDURE — 97112 NEUROMUSCULAR REEDUCATION: CPT | Mod: PN,CQ

## 2025-06-25 NOTE — PROGRESS NOTES
Outpatient Rehab    Physical Therapy Visit    Patient Name: Gayatri Dela Cruz  MRN: [id number]  YOB: 1970  Encounter Date: 6/25/2025    Therapy Diagnosis:   Encounter Diagnoses   Name Primary?    Posture abnormality Yes    Strength loss of      Physician: Ramo David MD    Physician Orders: Eval and Treat  Medical Diagnosis: Acute bilateral low back pain without sciatica  Pain in thoracic spine  Surgical Diagnosis: Not applicable for this Episode   Surgical Date: Not applicable for this Episode  Days Since Last Surgery: Not applicable for this Episode    Visit # / Visits Authorized:  2 / 12  Insurance Authorization Period: 6/18/2025 to 12/31/2025  Date of Evaluation: 6/19/2025  Plan of Care Certification: 6/20/2025 to 8/14/2025      PT/PTA: PTA   Number of PTA visits since last PT visit:2  Time In: 1500   Time Out: 1548  Total Time (in minutes): 48   Total Billable Time (in minutes): 48    FOTO:  Intake Score:  %  Survey Score 2:  %  Survey Score 3:  %    Precautions:       Subjective   Little sore upon arrival, she worked today..         Objective            Treatment:  Therapeutic Exercise  TE 1: Nustep x 10 minutes  Balance/Neuromuscular Re-Education  NMR 1: Open book x 20  NMR 2: Bridge GTB 3 x 10  NMR 3: Side lying clams GTB  3 x 10  NMR 4: TrA isometrics with PB x 30  NMR 5: SLR 2 x 10 B  NMR 6: Paloff press GTB 3 x 10  NMR 7: Hip extension over wedge 3 x 10 B  NMR 8: Precor hip abduction 60# 3 x 10  NMR 9: Precor lumbar extension 50# x 20  Therapeutic Activity  TA 1: Step ups x 20  TA 2: Standing hip abduction x 20 B    Time Entry(in minutes):  Neuromuscular Re-Education Time Entry: 30  Therapeutic Activity Time Entry: 10  Therapeutic Exercise Time Entry: 8    Assessment & Plan   Assessment: Gayatri is challenged with hip and core strength resulting in increased lumbar compensations. Progress as tolerated with focus on lifting mechanics for her job.   Evaluation/Treatment  Tolerance: Patient tolerated treatment well    The patient will continue to benefit from skilled outpatient physical therapy in order to address the deficits listed in the problem list on the initial evaluation, provide patient and family education, and maximize the patients level of independence in the home and community environments.     The patient's spiritual, cultural, and educational needs were considered, and the patient is agreeable to the plan of care and goals.           Plan: Continue per POC    Goals:   Active       Long term goals        patient goal: have no pain while working       Start:  06/19/25    Expected End:  08/14/25            patient will have improved FOTO score to predicted level to show true functional improvements       Start:  06/19/25    Expected End:  08/14/25            patient will be independent in progressed Home Exercise Program to improve functional strength       Start:  06/19/25    Expected End:  08/14/25               Short term goals        patient will be independent in Home Exercise Program to improve strength        Start:  06/19/25    Expected End:  07/17/25            patient will have improved glute strength by 1/3 MMT        Start:  06/19/25    Expected End:  07/17/25            patient will have improved sitting posture without brace to offload lumbar spine       Start:  06/19/25    Expected End:  07/17/25                Janette Hansen, PTA

## 2025-06-27 ENCOUNTER — OFFICE VISIT (OUTPATIENT)
Dept: VASCULAR SURGERY | Facility: CLINIC | Age: 55
End: 2025-06-27
Payer: COMMERCIAL

## 2025-06-27 VITALS
HEIGHT: 66 IN | HEART RATE: 69 BPM | SYSTOLIC BLOOD PRESSURE: 105 MMHG | TEMPERATURE: 98 F | BODY MASS INDEX: 32.24 KG/M2 | WEIGHT: 200.63 LBS | DIASTOLIC BLOOD PRESSURE: 68 MMHG

## 2025-06-27 DIAGNOSIS — I83.92 ASYMPTOMATIC VARICOSE VEINS OF LEFT LOWER EXTREMITY: Primary | ICD-10-CM

## 2025-06-27 DIAGNOSIS — I87.2 VENOUS INSUFFICIENCY: Primary | ICD-10-CM

## 2025-06-27 PROCEDURE — 99999 PR PBB SHADOW E&M-EST. PATIENT-LVL IV: CPT | Mod: PBBFAC,COE,, | Performed by: STUDENT IN AN ORGANIZED HEALTH CARE EDUCATION/TRAINING PROGRAM

## 2025-06-27 NOTE — PROGRESS NOTES
VASCULAR SURGERY SERVICE    REFERRING DOCTOR: Chelsie Temple MD    CHIEF COMPLAINT: Varicose veins    HISTORY OF PRESENT ILLNESS: Gayatri Dela Cruz is a 54 y.o. female with a past medical history significant for endometrial cancer, HLD, obesity, T2DM, HLD and a hiatal hernia who presents for evaluation of varicose veins on her left side. Has had them all her life but have worsened over the years. Recently lost 50 lbs,  has not noticed a different in veins. Has tried compression stockings but is not very adherent to them. Pain is 8/10, achy.  Legs throb at night.     Interval history 06/27/25:  She has been wearing the compression stockings multiple times a week only during the day. They have not provided her with any relief. Symptoms continue to be the same at 8/10 pian and aching.    Past Medical History:   Diagnosis Date    Arthritis 2021    COVID-19     Dyslipidemia     Encounter for blood transfusion 03/2019    Endometrial cancer 03/19/2019    Essential (primary) hypertension     Hiatal hernia 10/04/2022    Hyperlipidemia 2019    Morbid obesity with BMI of 45.0-49.9, adult     Simple ovarian cyst 03/19/2019    Type 2 diabetes mellitus with diabetic polyneuropathy        Past Surgical History:   Procedure Laterality Date    INJECTION OF ANESTHETIC AGENT INTO TISSUE PLANE DEFINED BY TRANSVERSUS ABDOMINIS MUSCLE  1/30/2025    Procedure: BLOCK, TRANSVERSUS ABDOMINIS PLANE;  Surgeon: Gely Dawson MD;  Location: [facility] OR [address] [place] [address];  Service: General;;    LAPAROSCOPIC GASTROENTEROSTOMY N/A 1/30/2025    Procedure: GASTROENTEROSTOMY, LAPAROSCOPIC W/ INTRAOP EGD;  Surgeon: Gely Dawson MD;  Location: [facility] OR [address] [place] [address];  Service: General;  Laterality: N/A;  [facility] WAL#  [id number]  BMI 42.97  SURGEON TO DO TAP BLOCK INTRAOP    LAPAROSCOPY-ASSISTED VAGINAL HYSTERECTOMY  04/01/2019    robotic       Current Medications[1]    Review of patient's allergies indicates:  No Known Allergies    Family  "History   Problem Relation Name Age of Onset    Mental illness Mother Kaylynn Torres     Miscarriages / Stillbirths Mother Kaylynn Torres     Obesity Mother Kaylynn Torres     Alcohol abuse Father Trevor Torres     Arthritis Father Trevor Torres     Vision loss Father Trevor Torres     Heart disease Father Trevor Torres     Pulmonary fibrosis Father Trevor Torres     Breast cancer Paternal Grandmother         Social History[2]    REVIEW OF SYSTEMS:  General: No chills, fever,   HEENT: No visual changes, difficulty hearing  Cardiovascular: No chest pain, palpitations, claudication  Pulmonary: No dyspnea, cough, wheezing  Gastrointestinal: No nausea, vomiting, diarrhea, constipation  Neurologic: no dizziness, no headaches, no weakness    PHYSICAL EXAM:   /68 (BP Location: Left arm, Patient Position: Sitting)   Pulse 69   Temp 98 °F (36.7 °C) (Oral)   Ht 5' 6" (1.676 m)   Wt 91 kg (200 lb 9.9 oz)   LMP 06/01/2016 Comment: pt has always had irregular periods  BMI 32.38 kg/m²   Constitutional:  Alert,   Well-appearing  In no distress.   Neurological: Normal speech  no focal findings  CN II - XII grossly intact.    Psychiatric: Mood and affect appropriate and symmetric.   HEENT: Normocephalic / atraumatic  PERRLA  Midline trachea  No scars across the neck   Cardiac: Regular rate and rhythm.   Pulmonary: Normal pulmonary effort.   Abdomen: Soft, not distended.     Skin: Warm and well perfused.    Vascular:  2+ DP's bilaterally. Large, ropey varicosities of her left lower leg.   Extremities/  Musculoskeletal: No edema.   Large varicose veins in left calf, warm     IMAGING:  Indication   ========     Venous Insufficiency     Lower Extremity Veins   =================     Rt CFV:    complete compression, complete color fill   Rt prox femoral V: complete compression, complete color fill   Rt mid femoral V:  complete compression, complete color fill   Rt distal femoral V:   complete compression, complete color fill   Rt prox " popliteal V:   complete compression, complete color fill   Rt mid popliteal V:    complete compression, complete color fill   Rt distal popliteal V: complete compression, complete color fill   Rt post tibial V:  complete compression, complete color fill,   Rt peroneal V: complete compression, complete color fill   Rt GSV:    complete compression, complete color fill, significant reflux   Rt SSV:    complete compression, complete color fill, no reflux   Add rt lower V details:    complete compression, complete color fill, Reflux Present   Lt CFV:    complete compression, complete color fill   Lt prox femoral V: complete compression, complete color fill   Lt mid femoral V:  complete compression, complete color fill   Lt distal femoral V:   complete compression, complete color fill   Lt prox popliteal V:   complete compression, complete color fill   Lt mid popliteal V:    complete compression, complete color fill   Lt distal popliteal V: complete compression, complete color fill   Lt post tibial V:  complete compression, complete color fill   Lt peroneal V: complete compression, complete color fill   Lt GSV:    complete compression, complete color fill, significant reflux   Lt SSV:    complete compression, complete color fill, no reflux   Add lt lower V details:    complete compression, complete color fill, Reflux Present   Additional lower vein: Saphenofemoral Junction   Other: No reflux noted in the Accessory Vein     Lower Extremity Vein Mapping   =======================     Rt prox GSV thigh AP diam  5.8 mm   Rt mid GSV thigh AP diam   4.9 mm   Rt distal GSV thigh AP diam    4.1 mm   Rt GSV knee AP diam    2.9 mm   Rt prox GSV calf AP diam   2.8 mm   Rt mid GSV calf AP diam    2.8 mm   Rt distal GSV calf AP diam 2.3 mm   Lt prox GSV thigh AP diam  10.5 mm   Lt mid GSV thigh AP diam   10.2 mm   Lt distal GSV thigh AP diam    13.6 mm   Lt GSV knee AP diam    8.7 mm   Lt prox GSV calf AP diam   14.9 mm   Lt mid GSV  calf AP diam    4.0 mm   Lt distal GSV calf AP diam 4.0 mm     Comment   ========     The diagnostic criteria used in our lab: Significant reflux is present when retrograde flow is noted both in the vein and at the   saphenofemoral junction (SFJ) or saphenopopliteal junction (SPJ)for 500 milliseconds (.5 second) following provocative maneuvers   when the patient is in the reverse trendelenburg position.     Impression   =========     Right Leg: Duplex imaging of the right lower extremity veins demonstrates patent and compressible veins.   There is no evidence of a venous thrombosis in the deep or superficial veins.   Significant reflux noted in the right GSV including the Saphenofemoral Junction (SFJ). No significant reflux noted in the SSV or   accessory vein.     Left Leg: Duplex imaging of the left lower extremity veins demonstrates patent and compressible veins.   There is no evidence of a venous thrombosis in the deep or superficial veins.   Significant reflux noted in the left GSV including the Saphenofemoral Junction (SFJ). No significant reflux noted in the SSV or   accessory vein.   The GSV branches extensively from the mid calf to the ankle.       DATE OF SERVICE: 04/07/2025                                                       Sonographer: LAVINIA DELGADO RVS   Electronically Signed by: Toni Castellon II at 04/08/2025-10:49     IMPRESSION: 53 yo female with symptomatic varicose veins of her left leg    PLAN:   We had a long discussion today about EVLT therapy.  We will start with a EVLT of her left greater saphenous vein.  After this, we will wait a month and treat any remaining varicosities with a stab phlebectomy.  I explained that she will need to be in thigh-high compression for a month after her procedure.  We discussed risks and benefits of the procedure in clinic today including but not limited to DVT, pain, thrombophlebitis, recanalization of the vein, recurrence of her symptoms, failure of her  symptoms to improve, scarring, burns, damage to the surrounding tissues.  All questions were answered.  We will try to get her set up in the near future for a left leg EVLT.                [1]   Current Outpatient Medications:     aspirin (ECOTRIN) 81 MG EC tablet, Take 81 mg by mouth once daily., Disp: , Rfl:     cyanocobalamin (VITAMIN B-12) 100 MCG tablet, Take 5,000 mcg by mouth. 2 Times in a week, Disp: , Rfl:     fish oil-omega-3 fatty acids 300-1,000 mg capsule, Take by mouth 2 (two) times daily., Disp: , Rfl:     gabapentin (NEURONTIN) 400 MG capsule, Take 2 capsules (800 mg total) by mouth every evening., Disp: 60 capsule, Rfl: 11    JANUVIA 50 mg Tab, 50 mg once daily., Disp: , Rfl:     JARDIANCE 10 mg tablet, Take 10 mg by mouth once daily., Disp: , Rfl:     lisinopriL (PRINIVIL,ZESTRIL) 5 MG tablet, Take 1 tablet (5 mg total) by mouth once daily., Disp: 90 tablet, Rfl: 3    melatonin 10 mg Chew, Take by mouth. Nightly, Disp: , Rfl:     metFORMIN (GLUCOPHAGE) 1000 MG tablet, Take 1,000 mg by mouth 2 (two) times daily. Taking time release, Disp: , Rfl:     multivitamin (THERAGRAN) per tablet, Take 1 tablet by mouth once daily., Disp: , Rfl:     ondansetron (ZOFRAN-ODT) 8 MG TbDL, DISSOLVE 1 tablet (8 mg total) by mouth every 6 (six) hours as needed (nausea)., Disp: 30 tablet, Rfl: 2    pioglitazone (ACTOS) 30 MG tablet, Take 30 mg by mouth once daily., Disp: , Rfl:     rosuvastatin (CRESTOR) 20 MG tablet, Take 20 mg by mouth once daily., Disp: , Rfl:     cyclobenzaprine (FLEXERIL) 5 MG tablet, Take 1 tablet (5 mg total) by mouth nightly as needed for Muscle spasms., Disp: 20 tablet, Rfl: 0    diclofenac sodium (VOLTAREN ARTHRITIS PAIN) 1 % Gel, Apply 2 g topically 4 (four) times daily., Disp: 50 g, Rfl: 1    omeprazole (PRILOSEC) 40 MG capsule, Take 1 capsule (40 mg total) by mouth every morning. Open capsule and sprinkle granules for 2 weeks post-op, Disp: 30 capsule, Rfl: 2    ursodioL (ZANE FORTE) 500  MG tablet, Take 1 tablet (500 mg total) by mouth once daily. for 14 days (Patient not taking: Reported on 2025), Disp: 14 tablet, Rfl: 0  [2]   Social History  Tobacco Use    Smoking status: Former     Current packs/day: 0.00     Average packs/day: 0.7 packs/day for 43.2 years (29.1 ttl pk-yrs)     Types: Cigarettes     Start date: 1988     Quit date: 3/20/2016     Years since quittin.2     Passive exposure: Past    Smokeless tobacco: Never    Tobacco comments:     Quit tobacco smoking in 2013   Substance Use Topics    Alcohol use: Not Currently     Comment: social    Drug use: Not Currently     Types: Marijuana     Comment: rarely

## 2025-06-30 ENCOUNTER — CLINICAL SUPPORT (OUTPATIENT)
Dept: REHABILITATION | Facility: HOSPITAL | Age: 55
End: 2025-06-30
Payer: COMMERCIAL

## 2025-06-30 ENCOUNTER — OFFICE VISIT (OUTPATIENT)
Dept: FAMILY MEDICINE | Facility: CLINIC | Age: 55
End: 2025-06-30
Payer: COMMERCIAL

## 2025-06-30 VITALS
OXYGEN SATURATION: 97 % | HEIGHT: 66 IN | SYSTOLIC BLOOD PRESSURE: 110 MMHG | BODY MASS INDEX: 32.03 KG/M2 | WEIGHT: 199.31 LBS | DIASTOLIC BLOOD PRESSURE: 62 MMHG | HEART RATE: 60 BPM

## 2025-06-30 DIAGNOSIS — K21.9 HIATAL HERNIA WITH GERD WITHOUT ESOPHAGITIS: ICD-10-CM

## 2025-06-30 DIAGNOSIS — R29.3 POSTURE ABNORMALITY: Primary | ICD-10-CM

## 2025-06-30 DIAGNOSIS — E78.2 MIXED HYPERLIPIDEMIA: ICD-10-CM

## 2025-06-30 DIAGNOSIS — E11.65 TYPE 2 DIABETES MELLITUS WITH HYPERGLYCEMIA, WITHOUT LONG-TERM CURRENT USE OF INSULIN: ICD-10-CM

## 2025-06-30 DIAGNOSIS — R53.1 STRENGTH LOSS OF: ICD-10-CM

## 2025-06-30 DIAGNOSIS — Z98.84 S/P BARIATRIC SURGERY: ICD-10-CM

## 2025-06-30 DIAGNOSIS — E11.42 DIABETIC POLYNEUROPATHY ASSOCIATED WITH TYPE 2 DIABETES MELLITUS: ICD-10-CM

## 2025-06-30 DIAGNOSIS — E66.01 CLASS 3 SEVERE OBESITY DUE TO EXCESS CALORIES WITH SERIOUS COMORBIDITY AND BODY MASS INDEX (BMI) OF 40.0 TO 44.9 IN ADULT: ICD-10-CM

## 2025-06-30 DIAGNOSIS — G47.30 SLEEP APNEA, UNSPECIFIED TYPE: ICD-10-CM

## 2025-06-30 DIAGNOSIS — E66.813 CLASS 3 SEVERE OBESITY DUE TO EXCESS CALORIES WITH SERIOUS COMORBIDITY AND BODY MASS INDEX (BMI) OF 40.0 TO 44.9 IN ADULT: ICD-10-CM

## 2025-06-30 DIAGNOSIS — I10 ESSENTIAL HYPERTENSION: Primary | ICD-10-CM

## 2025-06-30 DIAGNOSIS — K44.9 HIATAL HERNIA WITH GERD WITHOUT ESOPHAGITIS: ICD-10-CM

## 2025-06-30 DIAGNOSIS — M19.90 OSTEOARTHRITIS, UNSPECIFIED OSTEOARTHRITIS TYPE, UNSPECIFIED SITE: ICD-10-CM

## 2025-06-30 DIAGNOSIS — M54.9 BACK PAIN, UNSPECIFIED BACK LOCATION, UNSPECIFIED BACK PAIN LATERALITY, UNSPECIFIED CHRONICITY: ICD-10-CM

## 2025-06-30 PROCEDURE — 99214 OFFICE O/P EST MOD 30 MIN: CPT | Mod: S$GLB,COE,, | Performed by: STUDENT IN AN ORGANIZED HEALTH CARE EDUCATION/TRAINING PROGRAM

## 2025-06-30 PROCEDURE — 3066F NEPHROPATHY DOC TX: CPT | Mod: CPTII,S$GLB,COE, | Performed by: STUDENT IN AN ORGANIZED HEALTH CARE EDUCATION/TRAINING PROGRAM

## 2025-06-30 PROCEDURE — 3060F POS MICROALBUMINURIA REV: CPT | Mod: CPTII,S$GLB,COE, | Performed by: STUDENT IN AN ORGANIZED HEALTH CARE EDUCATION/TRAINING PROGRAM

## 2025-06-30 PROCEDURE — 99999 PR PBB SHADOW E&M-EST. PATIENT-LVL IV: CPT | Mod: PBBFAC,COE,, | Performed by: STUDENT IN AN ORGANIZED HEALTH CARE EDUCATION/TRAINING PROGRAM

## 2025-06-30 PROCEDURE — G2211 COMPLEX E/M VISIT ADD ON: HCPCS | Mod: S$GLB,COE,, | Performed by: STUDENT IN AN ORGANIZED HEALTH CARE EDUCATION/TRAINING PROGRAM

## 2025-06-30 PROCEDURE — 97530 THERAPEUTIC ACTIVITIES: CPT | Mod: PN,CQ

## 2025-06-30 PROCEDURE — 1160F RVW MEDS BY RX/DR IN RCRD: CPT | Mod: CPTII,S$GLB,COE, | Performed by: STUDENT IN AN ORGANIZED HEALTH CARE EDUCATION/TRAINING PROGRAM

## 2025-06-30 PROCEDURE — 1159F MED LIST DOCD IN RCRD: CPT | Mod: CPTII,S$GLB,COE, | Performed by: STUDENT IN AN ORGANIZED HEALTH CARE EDUCATION/TRAINING PROGRAM

## 2025-06-30 PROCEDURE — 3074F SYST BP LT 130 MM HG: CPT | Mod: CPTII,S$GLB,COE, | Performed by: STUDENT IN AN ORGANIZED HEALTH CARE EDUCATION/TRAINING PROGRAM

## 2025-06-30 PROCEDURE — 3078F DIAST BP <80 MM HG: CPT | Mod: CPTII,S$GLB,COE, | Performed by: STUDENT IN AN ORGANIZED HEALTH CARE EDUCATION/TRAINING PROGRAM

## 2025-06-30 PROCEDURE — 4010F ACE/ARB THERAPY RXD/TAKEN: CPT | Mod: CPTII,S$GLB,COE, | Performed by: STUDENT IN AN ORGANIZED HEALTH CARE EDUCATION/TRAINING PROGRAM

## 2025-06-30 PROCEDURE — 3008F BODY MASS INDEX DOCD: CPT | Mod: CPTII,S$GLB,COE, | Performed by: STUDENT IN AN ORGANIZED HEALTH CARE EDUCATION/TRAINING PROGRAM

## 2025-06-30 PROCEDURE — 97112 NEUROMUSCULAR REEDUCATION: CPT | Mod: PN,CQ

## 2025-06-30 PROCEDURE — 3044F HG A1C LEVEL LT 7.0%: CPT | Mod: CPTII,S$GLB,COE, | Performed by: STUDENT IN AN ORGANIZED HEALTH CARE EDUCATION/TRAINING PROGRAM

## 2025-06-30 RX ORDER — PIOGLITAZONE 30 MG/1
30 TABLET ORAL DAILY
Qty: 90 TABLET | Refills: 3 | Status: SHIPPED | OUTPATIENT
Start: 2025-06-30

## 2025-06-30 RX ORDER — ONDANSETRON 8 MG/1
8 TABLET, ORALLY DISINTEGRATING ORAL EVERY 6 HOURS PRN
Qty: 30 TABLET | Refills: 2 | Status: SHIPPED | OUTPATIENT
Start: 2025-06-30

## 2025-06-30 NOTE — PROGRESS NOTES
Outpatient Rehab    Physical Therapy Visit    Patient Name: Gayatri Dela Cruz  MRN: [id number]  YOB: 1970  Encounter Date: 6/30/2025    Therapy Diagnosis:   Encounter Diagnoses   Name Primary?    Posture abnormality Yes    Strength loss of      Physician: Ramo David MD    Physician Orders: Eval and Treat  Medical Diagnosis: Acute bilateral low back pain without sciatica  Pain in thoracic spine  Surgical Diagnosis: Not applicable for this Episode   Surgical Date: Not applicable for this Episode  Days Since Last Surgery: Not applicable for this Episode    Visit # / Visits Authorized:  3 / 12  Insurance Authorization Period: 6/18/2025 to 12/31/2025  Date of Evaluation: 6/19/2025  Plan of Care Certification: 6/20/2025 to 8/14/2025      PT/PTA:     Number of PTA visits since last PT visit:   Time In: 1400   Time Out: 1448  Total Time (in minutes): 48   Total Billable Time (in minutes): 48    FOTO:  Intake Score:  %  Survey Score 2:  %  Survey Score 3:  %    Precautions:       Subjective   She didnt work today so her back is not hurting as bad..         Objective            Treatment:  Therapeutic Exercise  TE 1: Nustep x 10 minutes  Balance/Neuromuscular Re-Education  NMR 1: Open book x 20  NMR 2: Bridge GTB 3 x 10  NMR 3: Side lying clams GTB  3 x 10  NMR 4: TrA isometrics with PB x 30  NMR 5: SLR 3 x 10 B 1#  NMR 6: Cable column: Paloff press 7# 3 x 10  NMR 7: Hip extension over wedge 3 x 10 B  NMR 8: Precor hip abduction 60# 3 x 10  NMR 9: Precor lumbar extension 50#  3 x 10  Therapeutic Activity  TA 1: Step ups x 20  TA 2: Standing hip abduction x 20 B    Time Entry(in minutes):  Neuromuscular Re-Education Time Entry: 30  Therapeutic Activity Time Entry: 10  Therapeutic Exercise Time Entry: 8    Assessment & Plan   Assessment: Good tolerance to all exercises with no adverse effects. Progress strength as tolerated.   Evaluation/Treatment Tolerance: Patient tolerated treatment well    The  patient will continue to benefit from skilled outpatient physical therapy in order to address the deficits listed in the problem list on the initial evaluation, provide patient and family education, and maximize the patients level of independence in the home and community environments.     The patient's spiritual, cultural, and educational needs were considered, and the patient is agreeable to the plan of care and goals.           Plan: Continue per POC    Goals:   Active       Long term goals        patient goal: have no pain while working       Start:  06/19/25    Expected End:  08/14/25            patient will have improved FOTO score to predicted level to show true functional improvements       Start:  06/19/25    Expected End:  08/14/25            patient will be independent in progressed Home Exercise Program to improve functional strength       Start:  06/19/25    Expected End:  08/14/25               Short term goals        patient will be independent in Home Exercise Program to improve strength        Start:  06/19/25    Expected End:  07/17/25            patient will have improved glute strength by 1/3 MMT        Start:  06/19/25    Expected End:  07/17/25            patient will have improved sitting posture without brace to offload lumbar spine       Start:  06/19/25    Expected End:  07/17/25                Janette Hansen, PTA

## 2025-06-30 NOTE — PROGRESS NOTES
[facility] [place]   OFFICE VISIT NOTE    Patient Name: Gayatri Dela Cruz  YOB: 1970    PRESENTING HISTORY     History of Present Illness:  Ms. Gayatri Dela Cruz is a 54 y.o. female     History of Present Illness    CHIEF COMPLAINT:  Patient presents today for follow up     BACK PAIN AND PHYSICAL THERAPY:  She reports severe back pain that began after returning to work and has worsened compared to previous episodes. She has severe degenerative disc disease which significantly impacts her daily functioning. She started physical therapy two weeks ago, attending twice weekly for a planned 16-week course. She reports weakness in core muscle strength and difficulty completing work shifts.    PAIN MANAGEMENT:  She is taking Gabapentin which she notes is critical for pain control, experiencing significant discomfort if she misses a dose. She also uses CBD cream for additional pain management.    FATIGUE:  She reports significant fatigue and consistently feeling tired throughout the day, impacting her daily functioning. She takes B12 supplements twice monthly as recommended.    DIET:  She reports post-surgical dietary changes with increased ability to eat. She now prefers dark chicken over light chicken and has developed a taste for red beans. She maintains a moderate approach to eating with occasional candy consumption.    GI:  She reports improved bowel habits after starting a fiber supplement recommended by her gym , noting two bowel movements this morning.    LABS:  CBC from March showed elevated RBC count which has since normalized. Vitamin D, iron studies, B1, and B12 levels are within normal range. Total cholesterol is 70. BUN is elevated, with recommendation to maintain adequate hydration.         Review of Systems   Constitutional:  Negative for chills and fever.   Gastrointestinal:  Negative for nausea and vomiting.   Musculoskeletal:  Positive for back pain.  "  Neurological:  Negative for dizziness and light-headedness.          OBJECTIVE:   Vital Signs:  Vitals:    06/30/25 1548   BP: 110/62   Pulse: 60   SpO2: 97%   Weight: 90.4 kg (199 lb 4.7 oz)   Height: 5' 6" (1.676 m)         Physical Exam  Constitutional:       General: She is not in acute distress.     Appearance: She is obese. She is not ill-appearing or toxic-appearing.   HENT:      Head: Normocephalic and atraumatic.      Mouth/Throat:      Mouth: Mucous membranes are moist.      Pharynx: Uvula midline. No pharyngeal swelling.   Cardiovascular:      Rate and Rhythm: Normal rate and regular rhythm.   Pulmonary:      Effort: Pulmonary effort is normal. No tachypnea, bradypnea, accessory muscle usage, prolonged expiration or respiratory distress.      Breath sounds: Normal breath sounds. No stridor. No wheezing, rhonchi or rales.   Neurological:      General: No focal deficit present.      Mental Status: She is alert.   Psychiatric:         Mood and Affect: Mood normal.         Behavior: Behavior normal.         ASSESSMENT & PLAN:     Essential hypertension  -     ondansetron (ZOFRAN-ODT) 8 MG TbDL; DISSOLVE 1 tablet (8 mg total) by mouth every 6 (six) hours as needed (nausea).  Dispense: 30 tablet; Refill: 2    Type 2 diabetes mellitus with hyperglycemia, without long-term current use of insulin  - Blood sugar levels are well-controlled in the low 100s with improved A1C at 5.8.  - Continuing statin therapy due to diabetes diagnosis.  - Discussed importance of ongoing cholesterol management for diabetic patients.  -     pioglitazone (ACTOS) 30 MG tablet; Take 1 tablet (30 mg total) by mouth once daily.  Dispense: 90 tablet; Refill: 3  -     ondansetron (ZOFRAN-ODT) 8 MG TbDL; DISSOLVE 1 tablet (8 mg total) by mouth every 6 (six) hours as needed (nausea).  Dispense: 30 tablet; Refill: 2    S/P bariatric surgery  -     ondansetron (ZOFRAN-ODT) 8 MG TbDL; DISSOLVE 1 tablet (8 mg total) by mouth every 6 (six) hours " as needed (nausea).  Dispense: 30 tablet; Refill: 2    Class 3 severe obesity due to excess calories with serious comorbidity and body mass index (BMI) of 40.0 to 44.9 in adult  -     ondansetron (ZOFRAN-ODT) 8 MG TbDL; DISSOLVE 1 tablet (8 mg total) by mouth every 6 (six) hours as needed (nausea).  Dispense: 30 tablet; Refill: 2    Mixed hyperlipidemia  - Reduced Rosuvastatin dosage from 20 mg to 10 mg daily (take half a tablet daily).    Hiatal hernia with GERD without esophagitis  -     ondansetron (ZOFRAN-ODT) 8 MG TbDL; DISSOLVE 1 tablet (8 mg total) by mouth every 6 (six) hours as needed (nausea).  Dispense: 30 tablet; Refill: 2    Osteoarthritis, unspecified osteoarthritis type, unspecified site    Diabetic polyneuropathy associated with type 2 diabetes mellitus  -     ondansetron (ZOFRAN-ODT) 8 MG TbDL; DISSOLVE 1 tablet (8 mg total) by mouth every 6 (six) hours as needed (nausea).  Dispense: 30 tablet; Refill: 2    Back pain, unspecified back location, unspecified back pain laterality, unspecified chronicity  - Patient reports severe back pain with degenerative disc disease.  - Currently undergoing physical therapy twice weekly for pain management.  - Discussed importance of core muscle strengthening.  - Continuing Gabapentin for pain management, taken at night.  - Will consider dosage adjustment based on pain control.    VARICOSE VEINS:  - Patient reports bothersome varicose veins.  - Scheduled for varicose vein surgery pending insurance approval.      Chelsie Temple MD  Family Medicine  [facility] [place]     This note was created using getupp voice recognition software that occasionally misinterprets phrases or words

## 2025-07-01 ENCOUNTER — PATIENT MESSAGE (OUTPATIENT)
Dept: VASCULAR SURGERY | Facility: CLINIC | Age: 55
End: 2025-07-01
Payer: COMMERCIAL

## 2025-07-03 ENCOUNTER — CLINICAL SUPPORT (OUTPATIENT)
Dept: REHABILITATION | Facility: HOSPITAL | Age: 55
End: 2025-07-03
Payer: COMMERCIAL

## 2025-07-03 DIAGNOSIS — R29.3 POSTURE ABNORMALITY: Primary | ICD-10-CM

## 2025-07-03 DIAGNOSIS — R53.1 STRENGTH LOSS OF: ICD-10-CM

## 2025-07-03 PROCEDURE — 97110 THERAPEUTIC EXERCISES: CPT | Mod: PN

## 2025-07-03 PROCEDURE — 97530 THERAPEUTIC ACTIVITIES: CPT | Mod: PN

## 2025-07-03 PROCEDURE — 97112 NEUROMUSCULAR REEDUCATION: CPT | Mod: PN

## 2025-07-03 NOTE — PROGRESS NOTES
Outpatient Rehab    Physical Therapy Visit    Patient Name: Gayatri Dela Cruz  MRN: [id number]  YOB: 1970  Encounter Date: 7/3/2025    Therapy Diagnosis:   Encounter Diagnoses   Name Primary?    Posture abnormality Yes    Strength loss of      Physician: Ramo David MD    Physician Orders: Eval and Treat  Medical Diagnosis: Acute bilateral low back pain without sciatica  Pain in thoracic spine  Surgical Diagnosis: Not applicable for this Episode   Surgical Date: Not applicable for this Episode  Days Since Last Surgery: Not applicable for this Episode    Visit # / Visits Authorized:  4 / 12  Insurance Authorization Period: 6/18/2025 to 12/31/2025  Date of Evaluation: 6/19/2025  Plan of Care Certification: 6/20/2025 to 8/14/2025      PT/PTA:     Number of PTA visits since last PT visit:   Time In: 1500   Time Out: 1535  Total Time (in minutes): 35   Total Billable Time (in minutes): 35    FOTO:  Intake Score:  %  Survey Score 2:  %  Survey Score 3:  %    Precautions:       Subjective   She is very tired from working, she got 21k steps at work today..  Pain reported as 5/10.      Objective            Treatment:  Therapeutic Exercise  TE 1: Nustep x 10 minutes  Balance/Neuromuscular Re-Education  NMR 1: Open book x 20  NMR 2: Bridge GTB 3 x 10  NMR 3: Side lying clams GTB  3 x 10  NMR 4: TrA isometrics with PB x 30  NMR 5: SLR 3 x 10 B 1#  NMR 6: Cable column: Paloff press 7# 3 x 10  Therapeutic Activity  TA 1: Step ups x 20  TA 2: Standing hip abduction x 20 B    Time Entry(in minutes):  Neuromuscular Re-Education Time Entry: 15  Therapeutic Activity Time Entry: 10  Therapeutic Exercise Time Entry: 10    Assessment & Plan   Assessment: Gayatri presented to clinic with increased fatigue from working a full shift. For that reason we kept interventions light and finished the session early. Will progress if able.  Evaluation/Treatment Tolerance: Patient tolerated treatment well    The patient  will continue to benefit from skilled outpatient physical therapy in order to address the deficits listed in the problem list on the initial evaluation, provide patient and family education, and maximize the patients level of independence in the home and community environments.     The patient's spiritual, cultural, and educational needs were considered, and the patient is agreeable to the plan of care and goals.           Plan: Continue per POC    Goals:   Active       Long term goals        patient goal: have no pain while working (Progressing)       Start:  06/19/25    Expected End:  08/14/25            patient will have improved FOTO score to predicted level to show true functional improvements (Progressing)       Start:  06/19/25    Expected End:  08/14/25            patient will be independent in progressed Home Exercise Program to improve functional strength (Progressing)       Start:  06/19/25    Expected End:  08/14/25               Short term goals        patient will be independent in Home Exercise Program to improve strength  (Progressing)       Start:  06/19/25    Expected End:  07/17/25            patient will have improved glute strength by 1/3 MMT  (Progressing)       Start:  06/19/25    Expected End:  07/17/25            patient will have improved sitting posture without brace to offload lumbar spine (Progressing)       Start:  06/19/25    Expected End:  07/17/25                Angie Conroy, PT, DPT  Board Certified Clinical Specialist in Orthopedic Physical Therapy

## 2025-07-07 ENCOUNTER — CLINICAL SUPPORT (OUTPATIENT)
Dept: REHABILITATION | Facility: HOSPITAL | Age: 55
End: 2025-07-07
Payer: COMMERCIAL

## 2025-07-07 DIAGNOSIS — R29.3 POSTURE ABNORMALITY: Primary | ICD-10-CM

## 2025-07-07 DIAGNOSIS — R53.1 STRENGTH LOSS OF: ICD-10-CM

## 2025-07-07 PROCEDURE — 97110 THERAPEUTIC EXERCISES: CPT | Mod: PN,CQ

## 2025-07-07 PROCEDURE — 97112 NEUROMUSCULAR REEDUCATION: CPT | Mod: PN,CQ

## 2025-07-07 NOTE — PROGRESS NOTES
Outpatient Rehab    Physical Therapy Visit    Patient Name: Gayatri Dela Cruz  MRN: [id number]  YOB: 1970  Encounter Date: 7/7/2025    Therapy Diagnosis:   Encounter Diagnoses   Name Primary?    Posture abnormality Yes    Strength loss of      Physician: Ramo David MD    Physician Orders: Eval and Treat  Medical Diagnosis: Acute bilateral low back pain without sciatica  Pain in thoracic spine  Surgical Diagnosis: Not applicable for this Episode   Surgical Date: Not applicable for this Episode  Days Since Last Surgery: Not applicable for this Episode    Visit # / Visits Authorized:  5 / 12  Insurance Authorization Period: 6/18/2025 to 12/31/2025  Date of Evaluation: 6/19/2025  Plan of Care Certification: 6/20/2025 to 8/14/2025      PT/PTA:     Number of PTA visits since last PT visit:   Time In: 1500   Time Out: 1545  Total Time (in minutes): 45   Total Billable Time (in minutes): 45    FOTO:  Intake Score:  %  Survey Score 2:  %  Survey Score 3:  %    Precautions:       Subjective   no new complaints stated..         Objective            Treatment:  Therapeutic Exercise  TE 1: Nustep x 10 minutes  Balance/Neuromuscular Re-Education  NMR 1: Open book x 20  NMR 2: Bridge GTB 3 x 10  NMR 3: Side lying clams GTB  3 x 10  NMR 4: TrA isometrics with PB x 30  NMR 5: SLR 3 x 10 B 1#  NMR 6: Cable column: Paloff press 7# 3 x 10  Therapeutic Activity  TA 1: Step ups x 20  TA 2: Standing hip abduction x 20 B    Time Entry(in minutes):  Neuromuscular Re-Education Time Entry: 25  Therapeutic Activity Time Entry: 10  Therapeutic Exercise Time Entry: 10    Assessment & Plan   Assessment: Patient completed exercises well. Still challenged with standing endurance. Fast pace with exercises today.   Evaluation/Treatment Tolerance: Patient tolerated treatment well    The patient will continue to benefit from skilled outpatient physical therapy in order to address the deficits listed in the problem list on  the initial evaluation, provide patient and family education, and maximize the patients level of independence in the home and community environments.     The patient's spiritual, cultural, and educational needs were considered, and the patient is agreeable to the plan of care and goals.           Plan: Continue per POC    Goals:   Active       Long term goals        patient goal: have no pain while working (Progressing)       Start:  06/19/25    Expected End:  08/14/25            patient will have improved FOTO score to predicted level to show true functional improvements (Progressing)       Start:  06/19/25    Expected End:  08/14/25            patient will be independent in progressed Home Exercise Program to improve functional strength (Progressing)       Start:  06/19/25    Expected End:  08/14/25               Short term goals        patient will be independent in Home Exercise Program to improve strength  (Progressing)       Start:  06/19/25    Expected End:  07/17/25            patient will have improved glute strength by 1/3 MMT  (Progressing)       Start:  06/19/25    Expected End:  07/17/25            patient will have improved sitting posture without brace to offload lumbar spine (Progressing)       Start:  06/19/25    Expected End:  07/17/25                Janette Hansen PTA

## 2025-07-16 ENCOUNTER — CLINICAL SUPPORT (OUTPATIENT)
Dept: REHABILITATION | Facility: HOSPITAL | Age: 55
End: 2025-07-16
Payer: COMMERCIAL

## 2025-07-16 DIAGNOSIS — R53.1 STRENGTH LOSS OF: ICD-10-CM

## 2025-07-16 DIAGNOSIS — R29.3 POSTURE ABNORMALITY: Primary | ICD-10-CM

## 2025-07-16 PROCEDURE — 97112 NEUROMUSCULAR REEDUCATION: CPT | Mod: PN,CQ

## 2025-07-16 PROCEDURE — 97530 THERAPEUTIC ACTIVITIES: CPT | Mod: PN,CQ

## 2025-07-16 NOTE — PROGRESS NOTES
Outpatient Rehab    Physical Therapy Visit    Patient Name: Gayatri Dela Cruz  MRN: [id number]  YOB: 1970  Encounter Date: 7/16/2025    Therapy Diagnosis:   Encounter Diagnoses   Name Primary?    Posture abnormality Yes    Strength loss of      Physician: Ramo David MD    Physician Orders: Eval and Treat  Medical Diagnosis: Acute bilateral low back pain without sciatica  Pain in thoracic spine  Surgical Diagnosis: Not applicable for this Episode   Surgical Date: Not applicable for this Episode  Days Since Last Surgery: Not applicable for this Episode    Visit # / Visits Authorized:  6 / 12  Insurance Authorization Period: 6/18/2025 to 12/31/2025  Date of Evaluation: 6/19/2025  Plan of Care Certification: 6/20/2025 to 8/14/2025      PT/PTA:     Number of PTA visits since last PT visit:   Time In: 1450   Time Out: 1530  Total Time (in minutes): 40   Total Billable Time (in minutes): 40    FOTO:  Intake Score:  %  Survey Score 2:  %  Survey Score 3:  %    Precautions:       Subjective   She is doing well..         Objective            Treatment:  Therapeutic Exercise  TE 1: Nustep x 10 minutes  Balance/Neuromuscular Re-Education  NMR 1: Open book x 20  NMR 2: Bridge GTB 3 x 10  NMR 3: Side lying clams GTB  3 x 10  NMR 4: TrA isometrics with PB x 30  NMR 5: SLR 3 x 10 B 1#  NMR 6: Cable column: Paloff press 7# 3 x 10  NMR 7: Hip extension over wedge 3 x 10 B  NMR 8: Precor hip abduction 60# 3 x 10  NMR 9: Precor lumbar extension 50#  3 x 10  Therapeutic Activity  TA 1: Step ups x 20  TA 2: Standing hip abduction x 20 B    Time Entry(in minutes):  Neuromuscular Re-Education Time Entry: 25  Therapeutic Activity Time Entry: 10  Therapeutic Exercise Time Entry: 8    Assessment & Plan   Assessment: Patient completed exercises well. Still challenged with standing endurance. Fast pace with exercises today.   Evaluation/Treatment Tolerance: Patient tolerated treatment well    The patient will  continue to benefit from skilled outpatient physical therapy in order to address the deficits listed in the problem list on the initial evaluation, provide patient and family education, and maximize the patients level of independence in the home and community environments.     The patient's spiritual, cultural, and educational needs were considered, and the patient is agreeable to the plan of care and goals.           Plan: Continue per POC    Goals:   Active       Long term goals        patient goal: have no pain while working (Progressing)       Start:  06/19/25    Expected End:  08/14/25            patient will have improved FOTO score to predicted level to show true functional improvements (Progressing)       Start:  06/19/25    Expected End:  08/14/25            patient will be independent in progressed Home Exercise Program to improve functional strength (Progressing)       Start:  06/19/25    Expected End:  08/14/25               Short term goals        patient will be independent in Home Exercise Program to improve strength  (Progressing)       Start:  06/19/25    Expected End:  07/17/25            patient will have improved glute strength by 1/3 MMT  (Progressing)       Start:  06/19/25    Expected End:  07/17/25            patient will have improved sitting posture without brace to offload lumbar spine (Progressing)       Start:  06/19/25    Expected End:  07/17/25                Janette Hansen PTA

## 2025-07-21 ENCOUNTER — CLINICAL SUPPORT (OUTPATIENT)
Dept: REHABILITATION | Facility: HOSPITAL | Age: 55
End: 2025-07-21
Payer: COMMERCIAL

## 2025-07-21 DIAGNOSIS — R53.1 STRENGTH LOSS OF: ICD-10-CM

## 2025-07-21 DIAGNOSIS — R29.3 POSTURE ABNORMALITY: Primary | ICD-10-CM

## 2025-07-21 PROCEDURE — 97112 NEUROMUSCULAR REEDUCATION: CPT | Mod: PN

## 2025-07-21 PROCEDURE — 97530 THERAPEUTIC ACTIVITIES: CPT | Mod: PN

## 2025-07-21 PROCEDURE — 97110 THERAPEUTIC EXERCISES: CPT | Mod: PN

## 2025-07-23 ENCOUNTER — CLINICAL SUPPORT (OUTPATIENT)
Dept: REHABILITATION | Facility: HOSPITAL | Age: 55
End: 2025-07-23
Payer: COMMERCIAL

## 2025-07-23 DIAGNOSIS — R29.3 POSTURE ABNORMALITY: Primary | ICD-10-CM

## 2025-07-23 DIAGNOSIS — R53.1 STRENGTH LOSS OF: ICD-10-CM

## 2025-07-23 PROCEDURE — 97112 NEUROMUSCULAR REEDUCATION: CPT | Mod: PN,CQ

## 2025-07-23 PROCEDURE — 97530 THERAPEUTIC ACTIVITIES: CPT | Mod: PN,CQ

## 2025-07-23 NOTE — PROGRESS NOTES
Outpatient Rehab    Physical Therapy Visit    Patient Name: Gayatri Dela Cruz  MRN: [id number]  YOB: 1970  Encounter Date: 7/23/2025    Therapy Diagnosis:   Encounter Diagnoses   Name Primary?    Posture abnormality Yes    Strength loss of      Physician: Ramo David MD    Physician Orders: Eval and Treat  Medical Diagnosis: Acute bilateral low back pain without sciatica  Pain in thoracic spine  Surgical Diagnosis: Not applicable for this Episode   Surgical Date: Not applicable for this Episode  Days Since Last Surgery: Not applicable for this Episode    Visit # / Visits Authorized:  8 / 12  Insurance Authorization Period: 6/18/2025 to 12/31/2025  Date of Evaluation: 6/19/2025  Plan of Care Certification: 6/20/2025 to 8/14/2025      PT/PTA:     Number of PTA visits since last PT visit:   Time In: 1500   Time Out: 1540  Total Time (in minutes): 40   Total Billable Time (in minutes): 40    FOTO:  Intake Score:  %  Survey Score 2:  %  Survey Score 3:  %    Precautions:       Subjective   Today she is tired and hurting a little bit..         Objective            Treatment:  Therapeutic Exercise  TE 1: Nustep x 10 minutes, level 3  Balance/Neuromuscular Re-Education  NMR 1: Open book x 20  NMR 2: Bridge GTB 3 x 10  NMR 3: Side lying clams GTB  3 x 10  NMR 4: TrA isometrics with PB x 30  NMR 5: SLR 3 x 10 B 1#  NMR 6: Cable column: Paloff press 7# 3 x 10  NMR 7: Hip extension over wedge 3 x 10 B  NMR 8: Precor hip abduction 60# 3 x 10  NMR 9: Precor lumbar extension 50#  3 x 10  Therapeutic Activity  TA 1: Step ups x 20  TA 2: Standing hip abduction x 20 B    Time Entry(in minutes):  Neuromuscular Re-Education Time Entry: 25  Therapeutic Activity Time Entry: 10  Therapeutic Exercise Time Entry: 8    Assessment & Plan   Assessment: Gayatri has improved tolerance and overall less pain/intensity since beginning therapy. Patient has days of discomfort, but not as many as she used too. Progress  as needed.        The patient will continue to benefit from skilled outpatient physical therapy in order to address the deficits listed in the problem list on the initial evaluation, provide patient and family education, and maximize the patients level of independence in the home and community environments.     The patient's spiritual, cultural, and educational needs were considered, and the patient is agreeable to the plan of care and goals.           Plan: Continue per POC    Goals:   Active       Long term goals        patient goal: have no pain while working (Progressing)       Start:  06/19/25    Expected End:  08/14/25            patient will have improved FOTO score to predicted level to show true functional improvements (Progressing)       Start:  06/19/25    Expected End:  08/14/25            patient will be independent in progressed Home Exercise Program to improve functional strength (Progressing)       Start:  06/19/25    Expected End:  08/14/25               Short term goals        patient will be independent in Home Exercise Program to improve strength  (Progressing)       Start:  06/19/25    Expected End:  07/17/25            patient will have improved glute strength by 1/3 MMT  (Progressing)       Start:  06/19/25    Expected End:  07/17/25            patient will have improved sitting posture without brace to offload lumbar spine (Progressing)       Start:  06/19/25    Expected End:  07/17/25                Janette Hansen PTA

## 2025-07-24 DIAGNOSIS — I83.92 ASYMPTOMATIC VARICOSE VEINS OF LEFT LOWER EXTREMITY: Primary | ICD-10-CM

## 2025-07-24 DIAGNOSIS — Z98.84 STATUS POST BARIATRIC SURGERY: ICD-10-CM

## 2025-07-24 NOTE — PROGRESS NOTES
Outpatient Rehab    Physical Therapy Visit    Patient Name: Gayatri Dela Cruz  MRN: [id number]  YOB: 1970  Encounter Date: 7/21/2025    Therapy Diagnosis:   Encounter Diagnoses   Name Primary?    Posture abnormality Yes    Strength loss of      Physician: Ramo David MD    Physician Orders: Eval and Treat  Medical Diagnosis: Acute bilateral low back pain without sciatica  Pain in thoracic spine  Surgical Diagnosis: Not applicable for this Episode   Surgical Date: Not applicable for this Episode  Days Since Last Surgery: Not applicable for this Episode    Visit # / Visits Authorized:  8 / 12  Insurance Authorization Period: 6/18/2025 to 12/31/2025  Date of Evaluation: 6/19/2025  Plan of Care Certification: 6/20/2025 to 8/14/2025      PT/PTA:     Number of PTA visits since last PT visit:   Time In: 1500   Time Out: 1540  Total Time (in minutes): 40   Total Billable Time (in minutes): 40    FOTO:  Intake Score:  %  Survey Score 2:  %  Survey Score 3:  %    Precautions:       Subjective   She thinks therapy is helping, she is seeing slow improvement in her tolerance..  Pain reported as 5/10.      Objective            Treatment:  Therapeutic Exercise  TE 1: Nustep x 10 minutes, level 3  Balance/Neuromuscular Re-Education  NMR 1: Open book x 20  NMR 2: Bridge GTB 3 x 10  NMR 3: Side lying clams GTB  3 x 10  NMR 4: TrA isometrics with PB x 30  NMR 5: SLR 3 x 10 B 1#  NMR 6: Cable column: Paloff press 7# 3 x 10  NMR 7: Hip extension over wedge 3 x 10 B  NMR 8: Precor hip abduction 60# 3 x 10  NMR 9: Precor lumbar extension 50#  3 x 10  Therapeutic Activity  TA 1: Step ups x 20  TA 2: Standing hip abduction x 20 B    Time Entry(in minutes):  Neuromuscular Re-Education Time Entry: 23  Therapeutic Activity Time Entry: 9  Therapeutic Exercise Time Entry: 8    Assessment & Plan   Assessment: Gayatri presented to therapy with reports of slight improvement in pain. She did well with interventions  with no increase in discomfort. Will continue to progress as tolerated.   Evaluation/Treatment Tolerance: Patient tolerated treatment well    The patient will continue to benefit from skilled outpatient physical therapy in order to address the deficits listed in the problem list on the initial evaluation, provide patient and family education, and maximize the patients level of independence in the home and community environments.     The patient's spiritual, cultural, and educational needs were considered, and the patient is agreeable to the plan of care and goals.           Plan: Continue per POC    Goals:   Active       Long term goals        patient goal: have no pain while working (Progressing)       Start:  06/19/25    Expected End:  08/14/25            patient will have improved FOTO score to predicted level to show true functional improvements (Progressing)       Start:  06/19/25    Expected End:  08/14/25            patient will be independent in progressed Home Exercise Program to improve functional strength (Progressing)       Start:  06/19/25    Expected End:  08/14/25               Short term goals        patient will be independent in Home Exercise Program to improve strength  (Progressing)       Start:  06/19/25    Expected End:  07/17/25            patient will have improved glute strength by 1/3 MMT  (Progressing)       Start:  06/19/25    Expected End:  07/17/25            patient will have improved sitting posture without brace to offload lumbar spine (Progressing)       Start:  06/19/25    Expected End:  07/17/25                Angie Conroy, PT, DPT  Board Certified Clinical Specialist in Orthopedic Physical Therapy

## 2025-07-25 ENCOUNTER — PROCEDURE VISIT (OUTPATIENT)
Dept: VASCULAR SURGERY | Facility: CLINIC | Age: 55
End: 2025-07-25
Payer: COMMERCIAL

## 2025-07-25 VITALS
WEIGHT: 200.63 LBS | DIASTOLIC BLOOD PRESSURE: 78 MMHG | HEIGHT: 66 IN | BODY MASS INDEX: 32.24 KG/M2 | SYSTOLIC BLOOD PRESSURE: 119 MMHG | HEART RATE: 67 BPM

## 2025-07-25 DIAGNOSIS — I87.2 VENOUS INSUFFICIENCY: Primary | ICD-10-CM

## 2025-07-25 DIAGNOSIS — I87.2 VENOUS INSUFFICIENCY OF LEFT LEG: Primary | ICD-10-CM

## 2025-07-25 DIAGNOSIS — I83.92 ASYMPTOMATIC VARICOSE VEINS OF LEFT LOWER EXTREMITY: ICD-10-CM

## 2025-07-25 RX ORDER — ALPRAZOLAM 0.5 MG/1
0.5 TABLET ORAL 3 TIMES DAILY
Qty: 2 TABLET | Refills: 0 | Status: SHIPPED | OUTPATIENT
Start: 2025-07-25 | End: 2025-07-25 | Stop reason: SDUPTHER

## 2025-07-25 RX ORDER — ALPRAZOLAM 0.5 MG/1
0.5 TABLET ORAL 3 TIMES DAILY
Qty: 2 TABLET | Refills: 0 | Status: SHIPPED | OUTPATIENT
Start: 2025-07-25 | End: 2025-07-26

## 2025-07-25 NOTE — PROGRESS NOTES
VASCULAR SURGERY PROCEDURE NOTE    Date of procedure: 7/25/25    Pre-Procedure Diagnosis: Left greater saphenous vein reflux; symptomatic superficial venous insufficiency    Post-Procedure Diagnsosis: Same    Procedure: Laser endovenous ablation of the left greater saphenous vein    Surgeon: Dorothy Vázquez MD     Anesthesia: Local  Ms. Dela Cruz is a 53 yo female with symptomatic venous reflux of her left leg which has failed conservative management. She desired surgical treatment with an EVLT of her left greater saphenous vein. Risks and benefits of the surgery were discussed with the patient in detail including but not limited to DVT, bleeding, thrombophlebitis, burns, nerve damage, loss of a limb, failure of the symptoms to resolve and need for further surgery. Informed consent was signed.    The patient was placed in reverse trendelenburg in supine position. The skin of the left leg was prepped and draped circumferentially in sterile fashion.  Ultrasound-guidance was used throughout the procedure with a portable duplex ultrasound machine.  The skin over the planned access site and tumescent injection sites was anesthetized with 1% lidocaine injection. The GSV was cannulated in the first attempt using a micro-puncture system. An 11 blade was used to widen the entry point in the skin. An 0.035 J-tipped wire was advanced through the GSV to the saphenofemoral junction followed by a 4-Fr Angiodynamics sheath. The laser was advanced through the sheath and the tip of the laser was visualized in the GSV 3cm distal to the saphenofemoral junction. Using tumescent anesthesia, the GSV was anesthesized from the cannulation site to the SFJ keeping the vein at least one cm deep to the skin; Klein pump was used.  Position of the tip of the laser was then adjusted and confirmed to be in the GSV 3cm from the SFJ measured on ultrasound.  The 1470 nm laser was then activated and the vein was treated down to 26cm at 50 J/cm.  The  fiber and sheath were then removed intact.  Duplex confirmed occlusion of the GSV with continued patency of the common femoral vein. The incision was dressed with 4x4 gauze and tegaderm.  Compression dressings and a compression stocking were applied and the patient was discharged to home in a satisfactory condition.    Cannulation site: Left distal thigh    Treatment length: 26cm    Bean of power: 7 W    Energy: 1016.3 Joules

## 2025-07-26 ENCOUNTER — PATIENT MESSAGE (OUTPATIENT)
Dept: VASCULAR SURGERY | Facility: CLINIC | Age: 55
End: 2025-07-26
Payer: COMMERCIAL

## 2025-07-28 ENCOUNTER — CLINICAL SUPPORT (OUTPATIENT)
Dept: REHABILITATION | Facility: HOSPITAL | Age: 55
End: 2025-07-28
Payer: COMMERCIAL

## 2025-07-28 DIAGNOSIS — R53.1 STRENGTH LOSS OF: ICD-10-CM

## 2025-07-28 DIAGNOSIS — R29.3 POSTURE ABNORMALITY: Primary | ICD-10-CM

## 2025-07-28 PROCEDURE — 97530 THERAPEUTIC ACTIVITIES: CPT | Mod: PN

## 2025-07-28 PROCEDURE — 97110 THERAPEUTIC EXERCISES: CPT | Mod: PN

## 2025-07-28 PROCEDURE — 97112 NEUROMUSCULAR REEDUCATION: CPT | Mod: PN

## 2025-07-29 ENCOUNTER — OFFICE VISIT (OUTPATIENT)
Dept: DERMATOLOGY | Facility: CLINIC | Age: 55
End: 2025-07-29
Payer: COMMERCIAL

## 2025-07-29 VITALS — WEIGHT: 189 LBS | HEIGHT: 66 IN | BODY MASS INDEX: 30.37 KG/M2

## 2025-07-29 DIAGNOSIS — L72.9 CYST OF SKIN: ICD-10-CM

## 2025-07-29 DIAGNOSIS — M71.30 MYXOID CYST: Primary | ICD-10-CM

## 2025-07-29 DIAGNOSIS — D48.5 NEOPLASM OF UNCERTAIN BEHAVIOR OF SKIN: ICD-10-CM

## 2025-07-29 PROCEDURE — 11102 TANGNTL BX SKIN SINGLE LES: CPT | Mod: S$GLB,,, | Performed by: STUDENT IN AN ORGANIZED HEALTH CARE EDUCATION/TRAINING PROGRAM

## 2025-07-29 PROCEDURE — 88304 TISSUE EXAM BY PATHOLOGIST: CPT | Mod: TC | Performed by: STUDENT IN AN ORGANIZED HEALTH CARE EDUCATION/TRAINING PROGRAM

## 2025-07-29 PROCEDURE — 99202 OFFICE O/P NEW SF 15 MIN: CPT | Mod: 25,S$GLB,, | Performed by: STUDENT IN AN ORGANIZED HEALTH CARE EDUCATION/TRAINING PROGRAM

## 2025-07-29 NOTE — PATIENT INSTRUCTIONS
Shave Biopsy Wound Care    Your doctor has performed a shave biopsy today.  A band aid and vaseline ointment has been placed over the site.  This should remain in place for 24 hours.  It is recommended that you keep the area dry for the first 24 hours.  After 24 hours, you may remove the band aid and wash the area with warm soap and water and apply Vaseline jelly.  Many patients prefer to use Neosporin or Bacitracin ointment.  This is acceptable; however, know that you can develop an allergy to this medication even if you have used it safely for years.  It is important to keep the area moist.  Letting it dry out and get air slows healing time, and will worsen the scar.  Band aid is optional after first 24 hours.      If you notice increasing redness, tenderness, pain, or yellow drainage at the biopsy site, please notify your doctor.  These are signs of an infection.    If your biopsy site is bleeding, apply firm pressure for 15 minutes straight.  Repeat for another 15 minutes, if it is still bleeding.   If the surgical site continues to bleed, then please contact your doctor.      [facility] [place] [address]/ [phone] FAX/ [email or web address]

## 2025-07-29 NOTE — PROGRESS NOTES
Outpatient Rehab    Physical Therapy Visit    Patient Name: Gayatri Dela Cruz  MRN: [id number]  YOB: 1970  Encounter Date: 7/28/2025    Therapy Diagnosis:   Encounter Diagnoses   Name Primary?    Posture abnormality Yes    Strength loss of      Physician: Ramo David MD    Physician Orders: Eval and Treat  Medical Diagnosis: Acute bilateral low back pain without sciatica  Pain in thoracic spine  Surgical Diagnosis: Not applicable for this Episode   Surgical Date: Not applicable for this Episode  Days Since Last Surgery: Not applicable for this Episode    Visit # / Visits Authorized:  9 / 12  Insurance Authorization Period: 6/18/2025 to 12/31/2025  Date of Evaluation: 6/19/2025  Plan of Care Certification: 6/20/2025 to 8/14/2025      PT/PTA:     Number of PTA visits since last PT visit:   Time In: 1500   Time Out: 1540  Total Time (in minutes): 40   Total Billable Time (in minutes): 40    FOTO:  Intake Score:  %  Survey Score 2:  %  Survey Score 3:  %    Precautions:       Subjective   Doing okay. She had some veins removed last Friday..  Pain reported as 4/10.      Objective            Treatment:  Therapeutic Exercise  TE 1: Nustep x 10 minutes, level 3  Balance/Neuromuscular Re-Education  NMR 1: Open book x 20  NMR 2: Bridge GTB 3 x 10  NMR 3: Side lying clams GTB  3 x 10  NMR 4: TrA isometrics with PB x 30  NMR 5: SLR 3 x 10 B 1#  NMR 6: Cable column: Paloff press 7# 3 x 10  NMR 7: Hip extension over wedge 3 x 10 B  NMR 8: Precor hip abduction 60# 3 x 10  NMR 9: Precor lumbar extension 50#  3 x 10  Therapeutic Activity  TA 1: Step ups x 20  TA 2: Standing hip abduction x 20 B    Time Entry(in minutes):  Neuromuscular Re-Education Time Entry: 23  Therapeutic Activity Time Entry: 9  Therapeutic Exercise Time Entry: 8    Assessment & Plan   Assessment: Gayatri presented to clinic with no back pain at beginning of session. She tolerated interventions very well with no increase in pain.  Will continue to progress as tolerated.   Evaluation/Treatment Tolerance: Patient tolerated treatment well    The patient will continue to benefit from skilled outpatient physical therapy in order to address the deficits listed in the problem list on the initial evaluation, provide patient and family education, and maximize the patients level of independence in the home and community environments.     The patient's spiritual, cultural, and educational needs were considered, and the patient is agreeable to the plan of care and goals.           Plan: Continue per POC    Goals:   Active       Long term goals        patient goal: have no pain while working (Progressing)       Start:  06/19/25    Expected End:  08/14/25            patient will have improved FOTO score to predicted level to show true functional improvements (Progressing)       Start:  06/19/25    Expected End:  08/14/25            patient will be independent in progressed Home Exercise Program to improve functional strength (Progressing)       Start:  06/19/25    Expected End:  08/14/25               Short term goals        patient will be independent in Home Exercise Program to improve strength  (Progressing)       Start:  06/19/25    Expected End:  07/17/25            patient will have improved glute strength by 1/3 MMT  (Progressing)       Start:  06/19/25    Expected End:  07/17/25            patient will have improved sitting posture without brace to offload lumbar spine (Progressing)       Start:  06/19/25    Expected End:  07/17/25                Angie Conroy, PT, DPT  Board Certified Clinical Specialist in Orthopedic Physical Therapy

## 2025-07-29 NOTE — PROGRESS NOTES
Subjective:      Patient ID:  Gayatri Dela Cruz is a 54 y.o. female who presents for   Chief Complaint   Patient presents with    Cyst     Back , chest      New patient    Here today to discuss cysts on back, neck and chest -has had them removed before but they come back- since 2010  C.o spot on LLE that doesn't  heal- x 1 month   C/o spot on left middle finger x 3 months that is painful     Derm hx:  Has no hx of NMSC  Has no fhx of MM          Review of Systems   Constitutional:  Negative for fever, chills and fatigue.   Skin:  Positive for activity-related sunscreen use. Negative for itching, rash, dry skin, daily sunscreen use, sensitivity to antibiotic ointment and wears hat.       Objective:   Physical Exam   Constitutional: She appears well-developed and well-nourished. No distress.   Neurological: She is alert and oriented to person, place, and time. She is not disoriented.   Psychiatric: She has a normal mood and affect.   Skin:   Areas Examined (abnormalities noted in diagram):   Chest / Axilla Inspection Performed  Back Inspection Performed  LUE Inspection Performed            Diagram Legend     Erythematous scaling macule/papule c/w actinic keratosis       Vascular papule c/w angioma      Pigmented verrucoid papule/plaque c/w seborrheic keratosis      Yellow umbilicated papule c/w sebaceous hyperplasia      Irregularly shaped tan macule c/w lentigo     1-2 mm smooth white papules consistent with Milia      Movable subcutaneous cyst with punctum c/w epidermal inclusion cyst      Subcutaneous movable cyst c/w pilar cyst      Firm pink to brown papule c/w dermatofibroma      Pedunculated fleshy papule(s) c/w skin tag(s)      Evenly pigmented macule c/w junctional nevus     Mildly variegated pigmented, slightly irregular-bordered macule c/w mildly atypical nevus      Flesh colored to evenly pigmented papule c/w intradermal nevus       Pink pearly papule/plaque c/w basal cell carcinoma       Erythematous hyperkeratotic cursted plaque c/w SCC      Surgical scar with no sign of skin cancer recurrence      Open and closed comedones      Inflammatory papules and pustules      Verrucoid papule consistent consistent with wart     Erythematous eczematous patches and plaques     Dystrophic onycholytic nail with subungual debris c/w onychomycosis     Umbilicated papule    Erythematous-base heme-crusted tan verrucoid plaque consistent with inflamed seborrheic keratosis     Erythematous Silvery Scaling Plaque c/w Psoriasis     See annotation            Assessment / Plan:        Myxoid cyst  -     Ambulatory referral/consult to Hand Surgery; Future; Expected date: 08/05/2025    Neoplasm of uncertain behavior of skin  Shave biopsy procedure note:    Shave biopsy performed after verbal consent including risk of infection, scar, recurrence, need for additional treatment of site. Area prepped with alcohol, anesthetized with approximately 1.0cc of 1% lidocaine with epinephrine. Lesional tissue shaved with razor blade. Hemostasis achieved with application of aluminum chloride followed by hyfrecation. No complications. Dressing applied. Wound care explained.    Cyst of skin  Reassurance given to patient. No treatment is necessary.   Discussed treatment options - excision vs observation. Cysts may recur with excision.                No follow-ups on file.

## 2025-07-30 ENCOUNTER — LAB VISIT (OUTPATIENT)
Dept: LAB | Facility: HOSPITAL | Age: 55
End: 2025-07-30
Attending: SURGERY
Payer: COMMERCIAL

## 2025-07-30 ENCOUNTER — HOSPITAL ENCOUNTER (OUTPATIENT)
Dept: VASCULAR SURGERY | Facility: CLINIC | Age: 55
Discharge: HOME | End: 2025-07-30
Attending: STUDENT IN AN ORGANIZED HEALTH CARE EDUCATION/TRAINING PROGRAM
Payer: COMMERCIAL

## 2025-07-30 DIAGNOSIS — I87.2 VENOUS INSUFFICIENCY: ICD-10-CM

## 2025-07-30 DIAGNOSIS — E66.01 MORBID OBESITY: ICD-10-CM

## 2025-07-30 DIAGNOSIS — Z98.84 S/P BARIATRIC SURGERY: ICD-10-CM

## 2025-07-30 LAB
25(OH)D3+25(OH)D2 SERPL-MCNC: 38 NG/ML (ref 30–96)
ABSOLUTE EOSINOPHIL (OHS): 0.13 K/UL
ABSOLUTE MONOCYTE (OHS): 0.62 K/UL (ref 0.3–1)
ABSOLUTE NEUTROPHIL COUNT (OHS): 3.17 K/UL (ref 1.8–7.7)
ALBUMIN SERPL BCP-MCNC: 3.8 G/DL (ref 3.5–5.2)
ALP SERPL-CCNC: 71 UNIT/L (ref 40–150)
ALT SERPL W/O P-5'-P-CCNC: 17 UNIT/L (ref 0–55)
ANION GAP (OHS): 8 MMOL/L (ref 8–16)
AST SERPL-CCNC: 22 UNIT/L (ref 0–50)
BASOPHILS # BLD AUTO: 0.05 K/UL
BASOPHILS NFR BLD AUTO: 0.9 %
BILIRUB SERPL-MCNC: 0.6 MG/DL (ref 0.1–1)
BUN SERPL-MCNC: 12 MG/DL (ref 6–20)
CALCIUM SERPL-MCNC: 9.3 MG/DL (ref 8.7–10.5)
CHLORIDE SERPL-SCNC: 107 MMOL/L (ref 95–110)
CHOLEST SERPL-MCNC: 87 MG/DL (ref 120–199)
CHOLEST/HDLC SERPL: 2.8 {RATIO} (ref 2–5)
CO2 SERPL-SCNC: 30 MMOL/L (ref 23–29)
CREAT SERPL-MCNC: 0.5 MG/DL (ref 0.5–1.4)
ERYTHROCYTE [DISTWIDTH] IN BLOOD BY AUTOMATED COUNT: 13.5 % (ref 11.5–14.5)
GFR SERPLBLD CREATININE-BSD FMLA CKD-EPI: >60 ML/MIN/1.73/M2
GLUCOSE SERPL-MCNC: 105 MG/DL (ref 70–110)
HCT VFR BLD AUTO: 43.7 % (ref 37–48.5)
HDLC SERPL-MCNC: 31 MG/DL (ref 40–75)
HDLC SERPL: 35.6 % (ref 20–50)
HGB BLD-MCNC: 13.9 GM/DL (ref 12–16)
IMM GRANULOCYTES # BLD AUTO: 0.01 K/UL (ref 0–0.04)
IMM GRANULOCYTES NFR BLD AUTO: 0.2 % (ref 0–0.5)
IRON SATN MFR SERPL: 11 % (ref 20–50)
IRON SERPL-MCNC: 41 UG/DL (ref 30–160)
LDLC SERPL CALC-MCNC: 40.2 MG/DL (ref 63–159)
LYMPHOCYTES # BLD AUTO: 1.8 K/UL (ref 1–4.8)
MCH RBC QN AUTO: 28.3 PG (ref 27–31)
MCHC RBC AUTO-ENTMCNC: 31.8 G/DL (ref 32–36)
MCV RBC AUTO: 89 FL (ref 82–98)
NONHDLC SERPL-MCNC: 56 MG/DL
NUCLEATED RBC (/100WBC) (OHS): 0 /100 WBC
PLATELET # BLD AUTO: 190 K/UL (ref 150–450)
PMV BLD AUTO: 10.2 FL (ref 9.2–12.9)
POTASSIUM SERPL-SCNC: 4.2 MMOL/L (ref 3.5–5.1)
PROT SERPL-MCNC: 6.6 GM/DL (ref 6–8.4)
RBC # BLD AUTO: 4.91 M/UL (ref 4–5.4)
RELATIVE EOSINOPHIL (OHS): 2.2 %
RELATIVE LYMPHOCYTE (OHS): 31.1 % (ref 18–48)
RELATIVE MONOCYTE (OHS): 10.7 % (ref 4–15)
RELATIVE NEUTROPHIL (OHS): 54.9 % (ref 38–73)
SODIUM SERPL-SCNC: 145 MMOL/L (ref 136–145)
TIBC SERPL-MCNC: 382 UG/DL (ref 250–450)
TRANSFERRIN SERPL-MCNC: 258 MG/DL (ref 200–375)
TRIGL SERPL-MCNC: 79 MG/DL (ref 30–150)
VIT B12 SERPL-MCNC: 435 PG/ML (ref 210–950)
WBC # BLD AUTO: 5.78 K/UL (ref 3.9–12.7)

## 2025-07-30 PROCEDURE — 82607 VITAMIN B-12: CPT

## 2025-07-30 PROCEDURE — 84466 ASSAY OF TRANSFERRIN: CPT

## 2025-07-30 PROCEDURE — 82040 ASSAY OF SERUM ALBUMIN: CPT

## 2025-07-30 PROCEDURE — 82306 VITAMIN D 25 HYDROXY: CPT

## 2025-07-30 PROCEDURE — 85025 COMPLETE CBC W/AUTO DIFF WBC: CPT

## 2025-07-30 PROCEDURE — 84425 ASSAY OF VITAMIN B-1: CPT

## 2025-07-30 PROCEDURE — 80061 LIPID PANEL: CPT

## 2025-07-30 PROCEDURE — 36415 COLL VENOUS BLD VENIPUNCTURE: CPT

## 2025-07-31 ENCOUNTER — RESULTS FOLLOW-UP (OUTPATIENT)
Dept: DERMATOLOGY | Facility: CLINIC | Age: 55
End: 2025-07-31
Payer: COMMERCIAL

## 2025-07-31 LAB
ESTROGEN SERPL-MCNC: NORMAL PG/ML
INSULIN SERPL-ACNC: NORMAL U[IU]/ML
LAB AP CLINICAL INFORMATION: NORMAL
LAB AP GROSS DESCRIPTION: NORMAL
LAB AP PERFORMING LOCATION(S): NORMAL
LAB AP REPORT FOOTNOTES: NORMAL
T3RU NFR SERPL: NORMAL %

## 2025-08-04 ENCOUNTER — CLINICAL SUPPORT (OUTPATIENT)
Dept: REHABILITATION | Facility: HOSPITAL | Age: 55
End: 2025-08-04
Payer: COMMERCIAL

## 2025-08-04 DIAGNOSIS — R29.3 POSTURE ABNORMALITY: Primary | ICD-10-CM

## 2025-08-04 PROBLEM — R53.1 STRENGTH LOSS OF: Status: RESOLVED | Noted: 2025-06-20 | Resolved: 2025-08-04

## 2025-08-04 LAB — W VITAMIN B1: 106 UG/L

## 2025-08-04 PROCEDURE — 97530 THERAPEUTIC ACTIVITIES: CPT | Mod: PN,CQ

## 2025-08-04 PROCEDURE — 97112 NEUROMUSCULAR REEDUCATION: CPT | Mod: PN,CQ

## 2025-08-04 PROCEDURE — 97110 THERAPEUTIC EXERCISES: CPT | Mod: PN,CQ

## 2025-08-04 NOTE — PROGRESS NOTES
Outpatient Rehab    Physical Therapy Visit    Patient Name: Gayatri Dela Cruz  MRN: [id number]  YOB: 1970  Encounter Date: 8/4/2025    Therapy Diagnosis:   Encounter Diagnosis   Name Primary?    Posture abnormality Yes     Physician: Ramo David MD    Physician Orders: Eval and Treat  Medical Diagnosis: Acute bilateral low back pain without sciatica  Pain in thoracic spine  Surgical Diagnosis: Not applicable for this Episode   Surgical Date: Not applicable for this Episode  Days Since Last Surgery: Not applicable for this Episode    Visit # / Visits Authorized:  10 / 12  Insurance Authorization Period: 6/18/2025 to 12/31/2025  Date of Evaluation: 6/19/2025  Plan of Care Certification: 6/20/2025 to 8/14/2025      PT/PTA:     Number of PTA visits since last PT visit:   Time In: 1500   Time Out: 1540  Total Time (in minutes): 40   Total Billable Time (in minutes): 40    FOTO:  Intake Score:  %  Survey Score 2:  %  Survey Score 3:  %    Precautions:       Subjective   Little bit of back pain, but not too bad..         Objective            Treatment:  Therapeutic Exercise  TE 1: Nustep x 10 minutes, level 3  Balance/Neuromuscular Re-Education  NMR 1: Open book x 20  NMR 2: Bridge GTB 3 x 10  NMR 3: Side lying clams GTB  3 x 10  NMR 4: TrA isometrics with PB x 30  NMR 5: SLR 3 x 10 B 1#  NMR 6: Cable column: Paloff press 7# 3 x 10  NMR 7: Hip extension over wedge 3 x 10 B  NMR 8: Precor hip abduction 70# 3 x 10  NMR 9: Precor lumbar extension 60#  3 x 10  Therapeutic Activity  TA 1: Step ups x 30  TA 2: Standing hip abduction x 30 B    Time Entry(in minutes):  Neuromuscular Re-Education Time Entry: 23  Therapeutic Activity Time Entry: 9  Therapeutic Exercise Time Entry: 8    Assessment & Plan   Assessment: Gayatri tolerated session well with focus on strength and mobility. Improved overall tolerance to exercises and during work days.  Will continue to progress as tolerated.        The  patient will continue to benefit from skilled outpatient physical therapy in order to address the deficits listed in the problem list on the initial evaluation, provide patient and family education, and maximize the patients level of independence in the home and community environments.     The patient's spiritual, cultural, and educational needs were considered, and the patient is agreeable to the plan of care and goals.           Plan: Continue per POC    Goals:   Active       Long term goals        patient goal: have no pain while working (Progressing)       Start:  06/19/25    Expected End:  08/14/25            patient will have improved FOTO score to predicted level to show true functional improvements (Progressing)       Start:  06/19/25    Expected End:  08/14/25            patient will be independent in progressed Home Exercise Program to improve functional strength (Progressing)       Start:  06/19/25    Expected End:  08/14/25               Short term goals        patient will be independent in Home Exercise Program to improve strength  (Progressing)       Start:  06/19/25    Expected End:  07/17/25            patient will have improved glute strength by 1/3 MMT  (Progressing)       Start:  06/19/25    Expected End:  07/17/25            patient will have improved sitting posture without brace to offload lumbar spine (Progressing)       Start:  06/19/25    Expected End:  07/17/25                Janette Hansen PTA

## 2025-08-06 ENCOUNTER — PATIENT MESSAGE (OUTPATIENT)
Dept: VASCULAR SURGERY | Facility: CLINIC | Age: 55
End: 2025-08-06

## 2025-08-07 ENCOUNTER — TELEPHONE (OUTPATIENT)
Dept: VASCULAR SURGERY | Facility: HOSPITAL | Age: 55
End: 2025-08-07
Payer: COMMERCIAL

## 2025-08-07 DIAGNOSIS — I83.90 VARICOSE VEINS OF LOWER EXTREMITY, UNSPECIFIED LATERALITY, UNSPECIFIED WHETHER COMPLICATED: Primary | ICD-10-CM

## 2025-08-07 DIAGNOSIS — I87.2 VENOUS INSUFFICIENCY: Primary | ICD-10-CM

## 2025-08-07 DIAGNOSIS — I83.92 ASYMPTOMATIC VARICOSE VEINS OF LEFT LOWER EXTREMITY: ICD-10-CM

## 2025-08-07 NOTE — TELEPHONE ENCOUNTER
"I spoke with Ms. Dela Cruz over the phone today. Overall she is feeling well since her procedure. She reports her varicose veins looking "lighter" and not sticking out as much and that her spider veins also seem less noticeable, however, she would still like to pursue a stab phlebectomy of her lower leg varicosities. We will set her up for this in the next few weeks.  "

## 2025-08-11 ENCOUNTER — CLINICAL SUPPORT (OUTPATIENT)
Dept: REHABILITATION | Facility: HOSPITAL | Age: 55
End: 2025-08-11
Payer: COMMERCIAL

## 2025-08-11 DIAGNOSIS — R29.3 POSTURE ABNORMALITY: Primary | ICD-10-CM

## 2025-08-11 PROCEDURE — 97530 THERAPEUTIC ACTIVITIES: CPT | Mod: PN

## 2025-08-11 PROCEDURE — 97112 NEUROMUSCULAR REEDUCATION: CPT | Mod: PN

## 2025-08-11 PROCEDURE — 97110 THERAPEUTIC EXERCISES: CPT | Mod: PN

## 2025-08-12 ENCOUNTER — PATIENT MESSAGE (OUTPATIENT)
Dept: BARIATRICS | Facility: CLINIC | Age: 55
End: 2025-08-12
Payer: COMMERCIAL

## 2025-08-13 ENCOUNTER — CLINICAL SUPPORT (OUTPATIENT)
Dept: BARIATRICS | Facility: CLINIC | Age: 55
End: 2025-08-13
Payer: COMMERCIAL

## 2025-08-13 ENCOUNTER — OFFICE VISIT (OUTPATIENT)
Dept: BARIATRICS | Facility: CLINIC | Age: 55
End: 2025-08-13
Payer: COMMERCIAL

## 2025-08-13 VITALS
BODY MASS INDEX: 30.03 KG/M2 | TEMPERATURE: 98 F | WEIGHT: 186.06 LBS | HEART RATE: 56 BPM | DIASTOLIC BLOOD PRESSURE: 69 MMHG | OXYGEN SATURATION: 99 % | SYSTOLIC BLOOD PRESSURE: 109 MMHG

## 2025-08-13 DIAGNOSIS — E66.811 OBESITY, CLASS I, BMI 30.0-34.9 (SEE ACTUAL BMI): Primary | ICD-10-CM

## 2025-08-13 DIAGNOSIS — E78.2 MIXED HYPERLIPIDEMIA: ICD-10-CM

## 2025-08-13 DIAGNOSIS — Z98.84 S/P BARIATRIC SURGERY: ICD-10-CM

## 2025-08-13 DIAGNOSIS — E11.42 DIABETIC POLYNEUROPATHY ASSOCIATED WITH TYPE 2 DIABETES MELLITUS: ICD-10-CM

## 2025-08-13 DIAGNOSIS — E11.65 TYPE 2 DIABETES MELLITUS WITH HYPERGLYCEMIA, WITHOUT LONG-TERM CURRENT USE OF INSULIN: ICD-10-CM

## 2025-08-13 DIAGNOSIS — G47.30 SLEEP APNEA, UNSPECIFIED TYPE: ICD-10-CM

## 2025-08-13 DIAGNOSIS — E66.9 OBESITY (BMI 30-39.9): ICD-10-CM

## 2025-08-13 DIAGNOSIS — I10 ESSENTIAL HYPERTENSION: ICD-10-CM

## 2025-08-13 DIAGNOSIS — Z71.3 DIETARY COUNSELING: Primary | ICD-10-CM

## 2025-08-13 DIAGNOSIS — Z98.84 STATUS POST GASTRIC BYPASS FOR OBESITY: ICD-10-CM

## 2025-08-13 PROBLEM — T14.8XXA FRACTURE: Status: RESOLVED | Noted: 2025-01-08 | Resolved: 2025-08-13

## 2025-08-13 PROBLEM — K21.9 GASTROESOPHAGEAL REFLUX DISEASE WITHOUT ESOPHAGITIS: Status: RESOLVED | Noted: 2022-08-23 | Resolved: 2025-08-13

## 2025-08-13 PROCEDURE — 99999 PR PBB SHADOW E&M-EST. PATIENT-LVL IV: CPT | Mod: PBBFAC,COE,, | Performed by: SURGERY

## 2025-08-13 PROCEDURE — 99999 PR PBB SHADOW E&M-EST. PATIENT-LVL I: CPT | Mod: PBBFAC,COE,, | Performed by: DIETITIAN, REGISTERED

## 2025-08-14 ENCOUNTER — CLINICAL SUPPORT (OUTPATIENT)
Dept: REHABILITATION | Facility: HOSPITAL | Age: 55
End: 2025-08-14
Payer: COMMERCIAL

## 2025-08-14 DIAGNOSIS — R29.3 POSTURE ABNORMALITY: Primary | ICD-10-CM

## 2025-08-14 PROCEDURE — 97110 THERAPEUTIC EXERCISES: CPT | Mod: PN

## 2025-08-14 PROCEDURE — 97112 NEUROMUSCULAR REEDUCATION: CPT | Mod: PN

## 2025-08-14 PROCEDURE — 97530 THERAPEUTIC ACTIVITIES: CPT | Mod: PN

## 2025-08-16 DIAGNOSIS — I10 ESSENTIAL HYPERTENSION: ICD-10-CM

## 2025-08-16 DIAGNOSIS — E11.65 TYPE 2 DIABETES MELLITUS WITH HYPERGLYCEMIA, WITHOUT LONG-TERM CURRENT USE OF INSULIN: ICD-10-CM

## 2025-08-16 DIAGNOSIS — Z98.84 S/P BARIATRIC SURGERY: ICD-10-CM

## 2025-08-17 ENCOUNTER — PATIENT MESSAGE (OUTPATIENT)
Dept: BARIATRICS | Facility: CLINIC | Age: 55
End: 2025-08-17
Payer: COMMERCIAL

## 2025-08-18 ENCOUNTER — PATIENT MESSAGE (OUTPATIENT)
Dept: ADMINISTRATIVE | Facility: HOSPITAL | Age: 55
End: 2025-08-18
Payer: COMMERCIAL

## 2025-08-18 RX ORDER — ONDANSETRON 8 MG/1
8 TABLET, ORALLY DISINTEGRATING ORAL EVERY 6 HOURS PRN
Qty: 30 TABLET | Refills: 2 | Status: SHIPPED | OUTPATIENT
Start: 2025-08-18

## 2025-08-19 DIAGNOSIS — Z12.11 SCREENING FOR COLON CANCER: ICD-10-CM

## 2025-08-20 ENCOUNTER — OFFICE VISIT (OUTPATIENT)
Dept: ORTHOPEDICS | Facility: CLINIC | Age: 55
End: 2025-08-20
Payer: COMMERCIAL

## 2025-08-20 DIAGNOSIS — M67.40 MUCOID CYST OF JOINT: Primary | ICD-10-CM

## 2025-08-20 DIAGNOSIS — M67.432 GANGLION CYST OF WRIST, LEFT: ICD-10-CM

## 2025-08-20 PROCEDURE — 99999 PR PBB SHADOW E&M-EST. PATIENT-LVL III: CPT | Mod: PBBFAC,COE,, | Performed by: ORTHOPAEDIC SURGERY

## 2025-08-20 PROCEDURE — 99204 OFFICE O/P NEW MOD 45 MIN: CPT | Mod: S$GLB,COE,, | Performed by: ORTHOPAEDIC SURGERY

## 2025-08-21 ENCOUNTER — PATIENT MESSAGE (OUTPATIENT)
Dept: ORTHOPEDICS | Facility: CLINIC | Age: 55
End: 2025-08-21
Payer: COMMERCIAL

## 2025-08-22 DIAGNOSIS — M67.40 MUCOID CYST OF JOINT: Primary | ICD-10-CM

## 2025-08-22 DIAGNOSIS — M67.432 GANGLION CYST OF WRIST, LEFT: ICD-10-CM

## 2025-08-22 DIAGNOSIS — M67.442 DIGITAL MUCOUS CYST OF FINGER OF LEFT HAND: ICD-10-CM

## 2025-08-22 RX ORDER — CEFAZOLIN SODIUM 2 G/50ML
2 SOLUTION INTRAVENOUS
OUTPATIENT
Start: 2025-08-22

## 2025-08-26 ENCOUNTER — PROCEDURE VISIT (OUTPATIENT)
Dept: DERMATOLOGY | Facility: CLINIC | Age: 55
End: 2025-08-26
Payer: COMMERCIAL

## 2025-08-26 DIAGNOSIS — L72.9 CYST OF SKIN: Primary | ICD-10-CM

## 2025-08-26 PROCEDURE — 88304 TISSUE EXAM BY PATHOLOGIST: CPT | Mod: TC | Performed by: PATHOLOGY

## 2025-08-26 RX ORDER — DOXYCYCLINE 100 MG/1
100 CAPSULE ORAL 2 TIMES DAILY
Qty: 10 CAPSULE | Refills: 0 | Status: SHIPPED | OUTPATIENT
Start: 2025-08-26

## 2025-08-27 ENCOUNTER — TELEPHONE (OUTPATIENT)
Dept: ORTHOPEDICS | Facility: CLINIC | Age: 55
End: 2025-08-27
Payer: COMMERCIAL

## 2025-08-28 ENCOUNTER — PATIENT MESSAGE (OUTPATIENT)
Dept: ORTHOPEDICS | Facility: CLINIC | Age: 55
End: 2025-08-28
Payer: COMMERCIAL

## (undated) DEVICE — HEMOSTAT SURGICEL SNOW 2X4IN

## (undated) DEVICE — SUT MONOCRYL 4-0 PS-2

## (undated) DEVICE — ELECTRODE MEGADYNE RETURN DUAL

## (undated) DEVICE — LUBRICANT SURGILUBE 2 OZ

## (undated) DEVICE — BLADE SURG CARBON STEEL SZ11

## (undated) DEVICE — STAPLER ECHELON FLEX 60MM 44CM

## (undated) DEVICE — RELOAD ECHELON FLEX BLU 60MM

## (undated) DEVICE — PENCIL ROCKER SWITCH 10FT CORD

## (undated) DEVICE — NDL HYPO REG 25G X 1 1/2

## (undated) DEVICE — TUBING HF INSUFFLATION W/ FLTR

## (undated) DEVICE — CANNULA ENDOPATH XCEL 5X100MM

## (undated) DEVICE — DRAPE ABDOMINAL TIBURON 14X11

## (undated) DEVICE — SOL NACL 0.9% IV INJ 1000ML

## (undated) DEVICE — APPLICATOR CHLORAPREP ORN 26ML

## (undated) DEVICE — SHEARS HARMONIC ADV CRV 45CM

## (undated) DEVICE — SCISSOR 5MMX35CM DIRECT DRIVE

## (undated) DEVICE — APPLIER CLIP ENDO LIGAMAX 5MM

## (undated) DEVICE — TROCAR ENDOPATH XCEL 11MM 10CM

## (undated) DEVICE — SUT TICRON BLUE 2-0 48 SK

## (undated) DEVICE — TROCAR ENDOPATH XCEL 12MM 10CM

## (undated) DEVICE — ADHESIVE DERMABOND ADVANCED

## (undated) DEVICE — SUT VICRYL CTD 2-0 GI 27 SH

## (undated) DEVICE — GOWN SURGICAL X-LARGE

## (undated) DEVICE — TROCAR ENDOPATH XCEL 5X100MM

## (undated) DEVICE — RELOAD ECHELON FLEX WHT 60MM

## (undated) DEVICE — IRRIGATOR ENDOSCOPY DISP.

## (undated) DEVICE — SYR 10CC LUER LOCK

## (undated) DEVICE — TRAY MINOR GEN SURG OMC